# Patient Record
Sex: FEMALE | Race: WHITE | NOT HISPANIC OR LATINO | Employment: FULL TIME | URBAN - METROPOLITAN AREA
[De-identification: names, ages, dates, MRNs, and addresses within clinical notes are randomized per-mention and may not be internally consistent; named-entity substitution may affect disease eponyms.]

---

## 2017-07-03 ENCOUNTER — TELEPHONE (OUTPATIENT)
Dept: FAMILY MEDICINE | Facility: CLINIC | Age: 57
End: 2017-07-03

## 2017-07-03 RX ORDER — NEOMYCIN SULFATE, POLYMYXIN B SULFATE AND HYDROCORTISONE 10; 3.5; 1 MG/ML; MG/ML; [USP'U]/ML
4 SUSPENSION/ DROPS AURICULAR (OTIC) 4 TIMES DAILY
Qty: 10 ML | Refills: 1 | Status: SHIPPED | OUTPATIENT
Start: 2017-07-03 | End: 2017-07-13

## 2017-08-14 ENCOUNTER — OFFICE VISIT (OUTPATIENT)
Dept: FAMILY MEDICINE | Facility: CLINIC | Age: 57
End: 2017-08-14

## 2017-08-14 DIAGNOSIS — M19.079 ANKLE ARTHRITIS: Primary | ICD-10-CM

## 2017-08-14 PROCEDURE — 99202 OFFICE O/P NEW SF 15 MIN: CPT | Mod: S$GLB,,, | Performed by: FAMILY MEDICINE

## 2017-08-14 RX ORDER — METHYLPREDNISOLONE 4 MG/1
TABLET ORAL
Qty: 1 PACKAGE | Refills: 0 | Status: SHIPPED | OUTPATIENT
Start: 2017-08-14 | End: 2021-11-30

## 2017-08-20 NOTE — PROGRESS NOTES
Subjective:      Patient ID: Kentrell Aguilera is a 57 y.o. female.    Chief Complaint: No chief complaint on file.    Ankle pain; no known injuries unless when getting a pedicure and twisted it; several week; swells; other foot is ok; otc no help;       Review of Systems   Musculoskeletal: Positive for arthralgias, gait problem and joint swelling.   All other systems reviewed and are negative.    Objective:     Physical Exam   Musculoskeletal: Normal range of motion. She exhibits edema and tenderness. She exhibits no deformity.   Left ankle pain with rom and palpation and walking; no obvious deformity     Assessment:     1. Ankle arthritis      Plan:     New Prescriptions    METHYLPREDNISOLONE (MEDROL DOSEPACK) 4 MG TABLET    use as directed     Discontinued Medications    No medications on file     Modified Medications    No medications on file       Ankle arthritis  Comments:  left    Orders:  -     X-Ray Ankle Complete Left; Future; Expected date: 08/14/2017  -     Uric acid; Future; Expected date: 08/14/2017    Other orders  -     methylPREDNISolone (MEDROL DOSEPACK) 4 mg tablet; use as directed  Dispense: 1 Package; Refill: 0

## 2017-09-18 ENCOUNTER — TELEPHONE (OUTPATIENT)
Dept: FAMILY MEDICINE | Facility: CLINIC | Age: 57
End: 2017-09-18

## 2017-09-18 NOTE — TELEPHONE ENCOUNTER
----- Message from Eugenia Gamez sent at 9/18/2017  8:50 AM CDT -----  Contact: Pt [phone]  Patient stop by office to  paper work for handicapped sticker. Patient also would like to know if Dr. Mann spoke with Dr. Daniels this morning.

## 2017-10-31 RX ORDER — ACYCLOVIR 400 MG/1
TABLET ORAL
Qty: 30 TABLET | Refills: 3 | Status: SHIPPED | OUTPATIENT
Start: 2017-10-31 | End: 2017-10-31 | Stop reason: SDUPTHER

## 2017-10-31 NOTE — TELEPHONE ENCOUNTER
Please send to [facility]     acyclovir (ZOVIRAX) 400 MG tablet  Take 1 tablet (400 mg total) by mouth 3 (three) times daily.   Normal, Disp-30 tablet, R-3   Please consider 90 day supplies to promote better adherence

## 2017-11-01 RX ORDER — ACYCLOVIR 400 MG/1
400 TABLET ORAL 3 TIMES DAILY
Qty: 30 TABLET | Refills: 3 | Status: SHIPPED | OUTPATIENT
Start: 2017-11-01 | End: 2018-12-02 | Stop reason: SDUPTHER

## 2017-12-16 ENCOUNTER — TELEPHONE (OUTPATIENT)
Dept: FAMILY MEDICINE | Facility: CLINIC | Age: 57
End: 2017-12-16

## 2017-12-16 RX ORDER — ALPRAZOLAM 0.5 MG/1
0.5 TABLET ORAL DAILY PRN
Qty: 40 TABLET | Refills: 0 | Status: SHIPPED | OUTPATIENT
Start: 2017-12-16 | End: 2019-05-29 | Stop reason: SDUPTHER

## 2017-12-26 ENCOUNTER — TELEPHONE (OUTPATIENT)
Dept: FAMILY MEDICINE | Facility: CLINIC | Age: 57
End: 2017-12-26

## 2017-12-26 DIAGNOSIS — M79.671 RIGHT FOOT PAIN: Primary | ICD-10-CM

## 2017-12-26 RX ORDER — HYDROCODONE BITARTRATE AND ACETAMINOPHEN 5; 325 MG/1; MG/1
1 TABLET ORAL 2 TIMES DAILY PRN
Qty: 12 TABLET | Refills: 0 | Status: SHIPPED | OUTPATIENT
Start: 2017-12-26 | End: 2020-08-04

## 2017-12-28 ENCOUNTER — HOSPITAL ENCOUNTER (OUTPATIENT)
Dept: RADIOLOGY | Facility: HOSPITAL | Age: 57
Discharge: HOME | End: 2017-12-28
Attending: FAMILY MEDICINE

## 2017-12-28 DIAGNOSIS — M79.671 RIGHT FOOT PAIN: ICD-10-CM

## 2017-12-28 DIAGNOSIS — Z12.11 COLON CANCER SCREENING: ICD-10-CM

## 2017-12-28 PROCEDURE — 73630 X-RAY EXAM OF FOOT: CPT | Mod: TC,PO,RT

## 2018-06-09 ENCOUNTER — TELEPHONE (OUTPATIENT)
Dept: FAMILY MEDICINE | Facility: CLINIC | Age: 58
End: 2018-06-09

## 2018-06-09 RX ORDER — MECLIZINE HYDROCHLORIDE 25 MG/1
25 TABLET ORAL 3 TIMES DAILY PRN
Qty: 100 TABLET | Refills: 0 | Status: SHIPPED | OUTPATIENT
Start: 2018-06-09 | End: 2020-11-19

## 2018-06-15 DIAGNOSIS — Z11.59 NEED FOR HEPATITIS C SCREENING TEST: ICD-10-CM

## 2018-06-15 DIAGNOSIS — Z12.39 BREAST CANCER SCREENING: ICD-10-CM

## 2018-12-02 ENCOUNTER — TELEPHONE (OUTPATIENT)
Dept: FAMILY MEDICINE | Facility: CLINIC | Age: 58
End: 2018-12-02

## 2018-12-02 RX ORDER — ACYCLOVIR 400 MG/1
400 TABLET ORAL 3 TIMES DAILY
Qty: 30 TABLET | Refills: 3 | Status: SHIPPED | OUTPATIENT
Start: 2018-12-02 | End: 2018-12-02 | Stop reason: SDUPTHER

## 2018-12-02 RX ORDER — ACYCLOVIR 400 MG/1
400 TABLET ORAL 3 TIMES DAILY
Qty: 30 TABLET | Refills: 3 | Status: SHIPPED | OUTPATIENT
Start: 2018-12-02 | End: 2020-12-10

## 2018-12-02 RX ORDER — ACYCLOVIR 400 MG/1
400 TABLET ORAL 3 TIMES DAILY
Qty: 30 TABLET | Refills: 3 | Status: SHIPPED | OUTPATIENT
Start: 2018-12-02 | End: 2018-12-02

## 2018-12-11 ENCOUNTER — TELEPHONE (OUTPATIENT)
Dept: FAMILY MEDICINE | Facility: CLINIC | Age: 58
End: 2018-12-11

## 2018-12-11 DIAGNOSIS — Z00.00 WELLNESS EXAMINATION: Primary | ICD-10-CM

## 2019-01-03 DIAGNOSIS — Z12.11 COLON CANCER SCREENING: ICD-10-CM

## 2019-04-06 ENCOUNTER — TELEPHONE (OUTPATIENT)
Dept: FAMILY MEDICINE | Facility: CLINIC | Age: 59
End: 2019-04-06

## 2019-04-06 DIAGNOSIS — R07.9 CHEST PAIN, UNSPECIFIED TYPE: Primary | ICD-10-CM

## 2019-04-06 NOTE — TELEPHONE ENCOUNTER
Chest pain for 2 hours today around noon today; throbbing, resolved spontaneously  Ex smoker  nonradiatin

## 2019-04-09 ENCOUNTER — TELEPHONE (OUTPATIENT)
Dept: FAMILY MEDICINE | Facility: CLINIC | Age: 59
End: 2019-04-09

## 2019-04-09 ENCOUNTER — HOSPITAL ENCOUNTER (OUTPATIENT)
Dept: CARDIOLOGY | Facility: HOSPITAL | Age: 59
Discharge: HOME | End: 2019-04-09
Payer: COMMERCIAL

## 2019-04-09 ENCOUNTER — HOSPITAL ENCOUNTER (OUTPATIENT)
Dept: RADIOLOGY | Facility: HOSPITAL | Age: 59
Discharge: HOME | End: 2019-04-09
Attending: FAMILY MEDICINE
Payer: COMMERCIAL

## 2019-04-09 DIAGNOSIS — R07.9 CHEST PAIN, UNSPECIFIED TYPE: ICD-10-CM

## 2019-04-09 DIAGNOSIS — Z00.00 WELLNESS EXAMINATION: Primary | ICD-10-CM

## 2019-04-09 DIAGNOSIS — E78.5 HYPERLIPIDEMIA, UNSPECIFIED HYPERLIPIDEMIA TYPE: Primary | ICD-10-CM

## 2019-04-09 PROCEDURE — 71046 X-RAY EXAM CHEST 2 VIEWS: CPT | Mod: TC,FY,PO

## 2019-04-09 PROCEDURE — 93010 ELECTROCARDIOGRAM REPORT: CPT | Mod: ,,, | Performed by: INTERNAL MEDICINE

## 2019-04-09 PROCEDURE — 93010 EKG 12-LEAD: ICD-10-PCS | Mod: ,,, | Performed by: INTERNAL MEDICINE

## 2019-04-09 PROCEDURE — 93005 ELECTROCARDIOGRAM TRACING: CPT | Mod: PO

## 2019-04-09 NOTE — TELEPHONE ENCOUNTER
Pt notified.     ----- Message from Rayray Mann MD sent at 4/9/2019  9:53 AM CDT -----  CALL PT TESTS ARE NORMAL

## 2019-04-10 ENCOUNTER — TELEPHONE (OUTPATIENT)
Dept: FAMILY MEDICINE | Facility: CLINIC | Age: 59
End: 2019-04-10

## 2019-04-10 NOTE — TELEPHONE ENCOUNTER
Gave pt lab results. She stated that she does not eat red meats or dairy at all, and she'd like to know what else she can do to lower her cholesterol levels. She did state that she has a rigorous exercise routine. Any additional suggestions?

## 2019-04-10 NOTE — TELEPHONE ENCOUNTER
----- Message from Rayray Mann MD sent at 4/9/2019  9:22 PM CDT -----  Cholesterol high; follow low cholesterol diet : avoid red meat and dairy products and repeat lipid 3 months

## 2019-04-15 ENCOUNTER — HOSPITAL ENCOUNTER (OUTPATIENT)
Dept: CARDIOLOGY | Facility: HOSPITAL | Age: 59
Discharge: HOME | End: 2019-04-15
Attending: FAMILY MEDICINE
Payer: COMMERCIAL

## 2019-04-15 DIAGNOSIS — R07.9 CHEST PAIN, UNSPECIFIED TYPE: ICD-10-CM

## 2019-04-15 LAB
CV STRESS BASE HR: 77 BPM
DIASTOLIC BLOOD PRESSURE: 99 MMHG
OHS CV CPX 1 MINUTE RECOVERY HEART RATE: 107 BPM
OHS CV CPX 85 PERCENT MAX PREDICTED HEART RATE MALE: 130
OHS CV CPX MAX PREDICTED HEART RATE: 153
OHS CV CPX PATIENT IS FEMALE: 1
OHS CV CPX PATIENT IS MALE: 0
OHS CV CPX PEAK DIASTOLIC BLOOD PRESSURE: 93 MMHG
OHS CV CPX PEAK HEAR RATE: 129 BPM
OHS CV CPX PEAK RATE PRESSURE PRODUCT: NORMAL
OHS CV CPX PEAK SYSTOLIC BLOOD PRESSURE: 155 MMHG
OHS CV CPX PERCENT MAX PREDICTED HEART RATE ACHIEVED: 84
OHS CV CPX PERCENT TARGET HEART RATE ACHIEVED: 94.85
OHS CV CPX RATE PRESSURE PRODUCT PRESENTING: NORMAL
OHS CV CPX TARGET HEART RATE: 136
STRESS ANGINA INDEX: 0
STRESS ECHO POST EXERCISE DUR MIN: 6 MIN
STRESS ECHO POST EXERCISE DUR SEC: 5
SYSTOLIC BLOOD PRESSURE: 151 MMHG

## 2019-04-15 PROCEDURE — 93018 CV STRESS TEST I&R ONLY: CPT | Mod: ,,, | Performed by: INTERNAL MEDICINE

## 2019-04-15 PROCEDURE — 93017 CV STRESS TEST TRACING ONLY: CPT | Mod: PO

## 2019-04-15 PROCEDURE — 93018 TREADMILL STRESS TEST (CUPID ONLY): ICD-10-PCS | Mod: ,,, | Performed by: INTERNAL MEDICINE

## 2019-04-15 PROCEDURE — 93016 CV STRESS TEST SUPVJ ONLY: CPT | Mod: ,,, | Performed by: INTERNAL MEDICINE

## 2019-04-15 PROCEDURE — 93016 TREADMILL STRESS TEST (CUPID ONLY): ICD-10-PCS | Mod: ,,, | Performed by: INTERNAL MEDICINE

## 2019-05-29 ENCOUNTER — TELEPHONE (OUTPATIENT)
Dept: FAMILY MEDICINE | Facility: CLINIC | Age: 59
End: 2019-05-29

## 2019-05-29 RX ORDER — CLOBETASOL PROPIONATE 0.5 MG/G
CREAM TOPICAL 2 TIMES DAILY
Qty: 60 G | Refills: 5 | Status: SHIPPED | OUTPATIENT
Start: 2019-05-29 | End: 2021-11-30

## 2019-05-29 RX ORDER — NEOMYCIN SULFATE, POLYMYXIN B SULFATE AND HYDROCORTISONE 10; 3.5; 1 MG/ML; MG/ML; [USP'U]/ML
3 SUSPENSION/ DROPS AURICULAR (OTIC) 3 TIMES DAILY
Qty: 10 ML | Refills: 2 | Status: SHIPPED | OUTPATIENT
Start: 2019-05-29 | End: 2020-08-03 | Stop reason: SDUPTHER

## 2019-05-29 RX ORDER — ALPRAZOLAM 0.5 MG/1
0.5 TABLET ORAL DAILY PRN
Qty: 40 TABLET | Refills: 0 | Status: SHIPPED | OUTPATIENT
Start: 2019-05-29 | End: 2020-08-03 | Stop reason: SDUPTHER

## 2019-05-31 RX ORDER — HYDROCORTISONE 25 MG/G
CREAM TOPICAL
Qty: 28 G | Refills: 11 | Status: SHIPPED | OUTPATIENT
Start: 2019-05-31 | End: 2020-05-02

## 2019-05-31 RX ORDER — HYDROCORTISONE 25 MG/G
CREAM TOPICAL 2 TIMES DAILY
Qty: 28 G | OUTPATIENT
Start: 2019-05-31

## 2019-05-31 NOTE — TELEPHONE ENCOUNTER
Reminder....    Pt states that you were going to call her in a cheaper cream.  The current one you prescribed is over 100.00

## 2019-07-15 PROBLEM — Z00.00 WELLNESS EXAMINATION: Status: RESOLVED | Noted: 2019-04-09 | Resolved: 2019-07-15

## 2019-11-13 ENCOUNTER — OFFICE VISIT (OUTPATIENT)
Dept: FAMILY MEDICINE | Facility: CLINIC | Age: 59
End: 2019-11-13
Payer: COMMERCIAL

## 2019-11-13 ENCOUNTER — HOSPITAL ENCOUNTER (OUTPATIENT)
Dept: RADIOLOGY | Facility: HOSPITAL | Age: 59
Discharge: HOME | End: 2019-11-13
Attending: FAMILY MEDICINE
Payer: COMMERCIAL

## 2019-11-13 ENCOUNTER — TELEPHONE (OUTPATIENT)
Dept: FAMILY MEDICINE | Facility: CLINIC | Age: 59
End: 2019-11-13

## 2019-11-13 VITALS
HEART RATE: 87 BPM | DIASTOLIC BLOOD PRESSURE: 78 MMHG | SYSTOLIC BLOOD PRESSURE: 118 MMHG | OXYGEN SATURATION: 97 % | TEMPERATURE: 98 F

## 2019-11-13 DIAGNOSIS — R06.02 SOB (SHORTNESS OF BREATH): Primary | ICD-10-CM

## 2019-11-13 DIAGNOSIS — R06.02 SOB (SHORTNESS OF BREATH): ICD-10-CM

## 2019-11-13 DIAGNOSIS — R07.9 ACUTE CHEST PAIN: ICD-10-CM

## 2019-11-13 DIAGNOSIS — Z12.39 SCREENING FOR BREAST CANCER: ICD-10-CM

## 2019-11-13 DIAGNOSIS — R06.00 DYSPNEA: Primary | ICD-10-CM

## 2019-11-13 PROCEDURE — 99213 PR OFFICE/OUTPT VISIT, EST, LEVL III, 20-29 MIN: ICD-10-PCS | Mod: S$GLB,,, | Performed by: FAMILY MEDICINE

## 2019-11-13 PROCEDURE — 25500020 PHARM REV CODE 255: Mod: PO | Performed by: FAMILY MEDICINE

## 2019-11-13 PROCEDURE — 99213 OFFICE O/P EST LOW 20 MIN: CPT | Mod: S$GLB,,, | Performed by: FAMILY MEDICINE

## 2019-11-13 PROCEDURE — 71275 CT ANGIOGRAPHY CHEST: CPT | Mod: TC,PO

## 2019-11-13 RX ADMIN — IOHEXOL 100 ML: 350 INJECTION, SOLUTION INTRAVENOUS at 03:11

## 2019-11-13 NOTE — TELEPHONE ENCOUNTER
----- Message from Bridgette Metzger sent at 11/13/2019  2:36 PM CST -----  Contact: Marielos stoddard/ [facility] [phone]  Marielos is calling regarding the medical records request you sent. She states you will need to order paper charts.

## 2019-11-13 NOTE — PROGRESS NOTES
Subjective:      Patient ID: Kentrell Aguilera is a 59 y.o. female.    Chief Complaint: Shortness of Breath (Happened yesterday while at a conference.); Dizziness; and Chest Pain      Vitals:    11/13/19 1254   BP: 118/78   Pulse: 87   Temp: 98.4 °F (36.9 °C)   SpO2: 97%        HPI   SOB Saturday afternoon after working in the garden in AM, worse after [employer] game and Sunday and has Wednesday, worsened;  No fever, no cough, chest kind of hurts; yesterday during at a conference, got dizzy, couldn't catch a deep breath; had same thin in April, had a cardiac w/u; no smoking for 15 years; no travels; took xarelto from Dr Terry 7 years ago for SOB and a fall and hurt ankle, with a bruise and took xarelto for 4 months, unknown reason    Review of Systems   Constitutional: Negative.    HENT: Negative.    Respiratory: Positive for shortness of breath.    Cardiovascular: Positive for chest pain.   Gastrointestinal: Negative.    Endocrine: Negative.    Genitourinary: Negative.    Musculoskeletal: Negative.    Neurological: Positive for weakness.   Psychiatric/Behavioral: Negative.    All other systems reviewed and are negative.    Objective:     Physical Exam   Constitutional: She is oriented to person, place, and time. She appears well-developed and well-nourished.   HENT:   Head: Normocephalic.   Eyes: Pupils are equal, round, and reactive to light. Conjunctivae and EOM are normal.   Neck: Normal range of motion. Neck supple.   Cardiovascular: Normal rate, regular rhythm and normal heart sounds.   Pulmonary/Chest: Effort normal and breath sounds normal.   Keeps sighing during visit   Musculoskeletal: Normal range of motion.   Neurological: She is alert and oriented to person, place, and time. She has normal reflexes.   Skin: Skin is warm and dry.   Psychiatric: She has a normal mood and affect. Her behavior is normal. Judgment and thought content normal.   Nursing note and vitals reviewed.    Assessment:     1. SOB (shortness  of breath)    2. Acute chest pain    3. Screening for breast cancer      Plan:        Medication List           Accurate as of November 13, 2019 11:59 PM. If you have any questions, ask your nurse or doctor.               CHANGE how you take these medications    clobetasol 0.05 % cream  Commonly known as:  TEMOVATE  Apply topically 2 (two) times daily. for 10 days  What changed:    · when to take this  · reasons to take this        CONTINUE taking these medications    acyclovir 400 MG tablet  Commonly known as:  ZOVIRAX  Take 1 tablet (400 mg total) by mouth 3 (three) times daily.     ALPRAZolam 0.5 MG tablet  Commonly known as:  XANAX  Take 1 tablet (0.5 mg total) by mouth daily as needed for Anxiety.     HYDROcodone-acetaminophen 5-325 mg per tablet  Commonly known as:  NORCO  Take 1 tablet by mouth 2 (two) times daily as needed for Pain.     hydrocortisone 2.5 % cream  APPLY  CREAM TO RASH TWICE DAILY     meclizine 25 mg tablet  Commonly known as:  ANTIVERT  Take 1 tablet (25 mg total) by mouth 3 (three) times daily as needed.     methylPREDNISolone 4 mg tablet  Commonly known as:  MEDROL DOSEPACK  use as directed     neomycin-polymyxin-hydrocortisone 3.5-10,000-1 mg/mL-unit/mL-% otic suspension  Commonly known as:  CORTISPORIN  Place 3 drops into the left ear 3 (three) times daily.     terbinafine HCl 250 mg tablet  Commonly known as:  LamISIL  Take 1 tablet (250 mg total) by mouth once daily.          SOB (shortness of breath)  -     CTA Chest Non Coronary; Future; Expected date: 11/13/2019    Acute chest pain  -     Cancel: CTA Chest Non Coronary; Future; Expected date: 11/13/2019  -     CTA Chest Non Coronary; Future; Expected date: 11/13/2019    Screening for breast cancer  -     Mammo Digital Screening Bilat w/ Shaquille; Future; Expected date: 11/13/2019

## 2019-11-15 ENCOUNTER — TELEPHONE (OUTPATIENT)
Dept: ADMINISTRATIVE | Facility: OTHER | Age: 59
End: 2019-11-15

## 2019-11-19 ENCOUNTER — TELEPHONE (OUTPATIENT)
Dept: FAMILY MEDICINE | Facility: CLINIC | Age: 59
End: 2019-11-19

## 2019-11-21 ENCOUNTER — PATIENT OUTREACH (OUTPATIENT)
Dept: ADMINISTRATIVE | Facility: HOSPITAL | Age: 59
End: 2019-11-21

## 2019-11-25 ENCOUNTER — HOSPITAL ENCOUNTER (OUTPATIENT)
Dept: RADIOLOGY | Facility: HOSPITAL | Age: 59
Discharge: HOME | End: 2019-11-25
Attending: FAMILY MEDICINE
Payer: COMMERCIAL

## 2019-11-25 DIAGNOSIS — Z12.39 SCREENING FOR BREAST CANCER: ICD-10-CM

## 2019-11-25 PROCEDURE — 77067 SCR MAMMO BI INCL CAD: CPT | Mod: TC,PO

## 2019-12-09 RX ORDER — ACYCLOVIR 400 MG/1
TABLET ORAL
Qty: 30 TABLET | Refills: 3 | Status: SHIPPED | OUTPATIENT
Start: 2019-12-09 | End: 2020-08-24 | Stop reason: SDUPTHER

## 2019-12-12 ENCOUNTER — TELEPHONE (OUTPATIENT)
Dept: FAMILY MEDICINE | Facility: CLINIC | Age: 59
End: 2019-12-12

## 2019-12-12 DIAGNOSIS — E78.5 HYPERLIPIDEMIA, UNSPECIFIED HYPERLIPIDEMIA TYPE: Primary | ICD-10-CM

## 2019-12-12 DIAGNOSIS — E55.9 VITAMIN D DEFICIENCY: ICD-10-CM

## 2019-12-12 NOTE — TELEPHONE ENCOUNTER
Pt wants to know if she needs any additional labs before the end of the year.  She does not recall having her WELLNESS labs done for the year.      Please let her know.

## 2019-12-16 ENCOUNTER — LAB VISIT (OUTPATIENT)
Dept: LAB | Facility: HOSPITAL | Age: 59
End: 2019-12-16
Attending: FAMILY MEDICINE
Payer: COMMERCIAL

## 2019-12-16 DIAGNOSIS — E55.9 VITAMIN D DEFICIENCY: ICD-10-CM

## 2019-12-16 DIAGNOSIS — E78.5 HYPERLIPIDEMIA, UNSPECIFIED HYPERLIPIDEMIA TYPE: ICD-10-CM

## 2019-12-16 LAB
25(OH)D3+25(OH)D2 SERPL-MCNC: 30 NG/ML (ref 30–96)
CHOLEST SERPL-MCNC: 239 MG/DL (ref 120–199)
CHOLEST/HDLC SERPL: 3.6 {RATIO} (ref 2–5)
HDLC SERPL-MCNC: 66 MG/DL (ref 40–75)
HDLC SERPL: 27.6 % (ref 20–50)
LDLC SERPL CALC-MCNC: 153 MG/DL (ref 63–159)
NONHDLC SERPL-MCNC: 173 MG/DL
TRIGL SERPL-MCNC: 100 MG/DL (ref 30–150)

## 2019-12-16 PROCEDURE — 36415 COLL VENOUS BLD VENIPUNCTURE: CPT | Mod: PO

## 2019-12-16 PROCEDURE — 80061 LIPID PANEL: CPT

## 2019-12-16 PROCEDURE — 82306 VITAMIN D 25 HYDROXY: CPT | Mod: PO

## 2019-12-17 ENCOUNTER — TELEPHONE (OUTPATIENT)
Dept: FAMILY MEDICINE | Facility: CLINIC | Age: 59
End: 2019-12-17

## 2019-12-17 DIAGNOSIS — E78.5 HYPERLIPIDEMIA, UNSPECIFIED HYPERLIPIDEMIA TYPE: Primary | ICD-10-CM

## 2019-12-17 RX ORDER — ATORVASTATIN CALCIUM 10 MG/1
10 TABLET, FILM COATED ORAL DAILY
Qty: 90 TABLET | Refills: 3 | Status: SHIPPED | OUTPATIENT
Start: 2019-12-17 | End: 2020-11-19

## 2020-03-03 ENCOUNTER — OFFICE VISIT (OUTPATIENT)
Dept: FAMILY MEDICINE | Facility: CLINIC | Age: 60
End: 2020-03-03
Payer: COMMERCIAL

## 2020-03-03 ENCOUNTER — PATIENT MESSAGE (OUTPATIENT)
Dept: FAMILY MEDICINE | Facility: CLINIC | Age: 60
End: 2020-03-03

## 2020-03-03 DIAGNOSIS — C44.90 SKIN CANCER: Primary | ICD-10-CM

## 2020-03-03 PROCEDURE — 99212 OFFICE O/P EST SF 10 MIN: CPT | Mod: S$GLB,,, | Performed by: FAMILY MEDICINE

## 2020-03-03 PROCEDURE — 88305 TISSUE EXAM BY PATHOLOGIST: ICD-10-PCS | Mod: 26,,, | Performed by: PATHOLOGY

## 2020-03-03 PROCEDURE — 88305 TISSUE EXAM BY PATHOLOGIST: CPT | Mod: 26,,, | Performed by: PATHOLOGY

## 2020-03-03 PROCEDURE — 99212 PR OFFICE/OUTPT VISIT, EST, LEVL II, 10-19 MIN: ICD-10-PCS | Mod: S$GLB,,, | Performed by: FAMILY MEDICINE

## 2020-03-03 PROCEDURE — 88305 TISSUE EXAM BY PATHOLOGIST: CPT | Performed by: PATHOLOGY

## 2020-03-03 NOTE — PROGRESS NOTES
Subjective:      Patient ID: Kentrell Aguilera is a 59 y.o. female.    Chief Complaint: No chief complaint on file.      There were no vitals filed for this visit.     HPI   Left lower neck/upper chest skin lesion needs excision    Review of Systems   Skin: Positive for wound.        Raised border skin lesion     Objective:     Physical Exam  Assessment:     No diagnosis found.  Plan:        Medication List           Accurate as of March 3, 2020  8:50 AM. If you have any questions, ask your nurse or doctor.               CHANGE how you take these medications    clobetasoL 0.05 % cream  Commonly known as:  TEMOVATE  Apply topically 2 (two) times daily. for 10 days  What changed:    · when to take this  · reasons to take this        CONTINUE taking these medications    * acyclovir 400 MG tablet  Commonly known as:  ZOVIRAX  Take 1 tablet (400 mg total) by mouth 3 (three) times daily.     * acyclovir 400 MG tablet  Commonly known as:  ZOVIRAX  TAKE 1 TABLET BY MOUTH THREE TIMES DAILY     ALPRAZolam 0.5 MG tablet  Commonly known as:  XANAX  Take 1 tablet (0.5 mg total) by mouth daily as needed for Anxiety.     atorvastatin 10 MG tablet  Commonly known as:  LIPITOR  Take 1 tablet (10 mg total) by mouth once daily.     HYDROcodone-acetaminophen 5-325 mg per tablet  Commonly known as:  NORCO  Take 1 tablet by mouth 2 (two) times daily as needed for Pain.     hydrocortisone 2.5 % cream  APPLY  CREAM TO RASH TWICE DAILY     meclizine 25 mg tablet  Commonly known as:  ANTIVERT  Take 1 tablet (25 mg total) by mouth 3 (three) times daily as needed.     methylPREDNISolone 4 mg tablet  Commonly known as:  MEDROL DOSEPACK  use as directed     neomycin-polymyxin-hydrocortisone 3.5-10,000-1 mg/mL-unit/mL-% otic suspension  Commonly known as:  CORTISPORIN  Place 3 drops into the left ear 3 (three) times daily.     terbinafine HCl 250 mg tablet  Commonly known as:  LamISIL  Take 1 tablet (250 mg total) by mouth once daily.         * This  list has 2 medication(s) that are the same as other medications prescribed for you. Read the directions carefully, and ask your doctor or other care provider to review them with you.              There are no diagnoses linked to this encounter.

## 2020-03-03 NOTE — PROCEDURES
"Excision of Benign Lesion  Date/Time: 3/3/2020 8:52 AM  Performed by: Rayray Mann MD  Authorized by: Rayray Mann MD     Consent Done?:  Yes (Verbal)  Time out: Immediately prior to procedure a "time out" was called to verify the correct patient, procedure, equipment, support staff and site/side marked as required.      STAFF:  Assistants?: No    INDICATIONS:: sq cell cancer.  LOCATION:  Body area:  Chestleft    PREP:Position:  Supine    ANESTHESIA:  Anesthesia:  Local infiltration  Local anesthetic:  Lidocaine 1% with epinephrine    PROCEDURE DETAILS:  Excision type:  Skin  Excision size (cm):  2  Scalpel size:  15  Incision type:  Elliptical  Specimens?: Yes    Specimens submitted to pathology.  Hemostasis was obtained.  Estimated blood loss (cc):  1  Wound closure:  Simple  Sutures:  Other (comments) (5-0 ethilon)  Sterile dressings:  Gauze  Post-op diagnosis: Same as pre-op diagnosis.  Patient tolerated the procedure well with no immediate complications.  Post-operative instructions were provided for the patient.  Patient was discharged and will follow up for wound check and pathology results.      "

## 2020-03-09 LAB
FINAL PATHOLOGIC DIAGNOSIS: NORMAL
GROSS: NORMAL
MICROSCOPIC EXAM: NORMAL

## 2020-03-10 ENCOUNTER — TELEPHONE (OUTPATIENT)
Dept: FAMILY MEDICINE | Facility: CLINIC | Age: 60
End: 2020-03-10

## 2020-03-10 NOTE — TELEPHONE ENCOUNTER
----- Message from Rayray Mann MD sent at 3/10/2020  7:29 AM CDT -----  It was a skin cancer.  Basal cell cancer.  Completely excised.  Return to clinic to remove sutures.

## 2020-04-30 DIAGNOSIS — M25.572 ACUTE LEFT ANKLE PAIN: Primary | ICD-10-CM

## 2020-05-01 ENCOUNTER — TELEPHONE (OUTPATIENT)
Dept: FAMILY MEDICINE | Facility: CLINIC | Age: 60
End: 2020-05-01

## 2020-05-01 DIAGNOSIS — M25.572 ACUTE LEFT ANKLE PAIN: Primary | ICD-10-CM

## 2020-05-01 RX ORDER — HYDROCORTISONE VALERATE CREAM 2 MG/G
CREAM TOPICAL 2 TIMES DAILY
Qty: 45 G | Refills: 1 | Status: SHIPPED | OUTPATIENT
Start: 2020-05-01 | End: 2020-05-02

## 2020-05-01 NOTE — TELEPHONE ENCOUNTER
Patient needs x-ray of her ankle and to see Dr. Daniels orthopedics help her get these things done please thank you

## 2020-05-01 NOTE — TELEPHONE ENCOUNTER
Pt has been scheduled for xray. Referral to Dr. Daniels's office has been faxed. They will contact pt to schedule an appt.

## 2020-05-02 ENCOUNTER — TELEPHONE (OUTPATIENT)
Dept: FAMILY MEDICINE | Facility: CLINIC | Age: 60
End: 2020-05-02

## 2020-05-04 ENCOUNTER — HOSPITAL ENCOUNTER (OUTPATIENT)
Dept: RADIOLOGY | Facility: HOSPITAL | Age: 60
Discharge: HOME | End: 2020-05-04
Attending: FAMILY MEDICINE

## 2020-05-04 DIAGNOSIS — M25.572 ACUTE LEFT ANKLE PAIN: ICD-10-CM

## 2020-05-04 PROCEDURE — 73610 X-RAY EXAM OF ANKLE: CPT | Mod: TC,FY,PO,LT

## 2020-05-25 ENCOUNTER — TELEPHONE (OUTPATIENT)
Dept: FAMILY MEDICINE | Facility: CLINIC | Age: 60
End: 2020-05-25

## 2020-05-25 NOTE — TELEPHONE ENCOUNTER
I placed a referral May 1 to Dr Daniels.  Pt came to my house and said no one has called her, yesterday.    Please call the pt to help her get her appt.

## 2020-05-26 ENCOUNTER — TELEPHONE (OUTPATIENT)
Dept: FAMILY MEDICINE | Facility: CLINIC | Age: 60
End: 2020-05-26

## 2020-05-26 NOTE — TELEPHONE ENCOUNTER
Susu - I spoke to the pt a few weeks back.  Seems as though Dr Daniels is not on her plan.  At least that is what she told me.  I told her that we could change the referral to an internal provider, but she said that she would wait to see how things progressed.      ----- Message from Susu Perez sent at 5/25/2020  8:19 AM CDT -----  Dr. Mann,    I apologize the Referral was place as external, they do not fall into my work Q. Unless a message is sent to me I will not be aware of the referral. I was not aware of this that it didn't fall into my work Q if it was put as an external.      I will fax it over to Dr. Ricks office on this morning.      Thank you,  (:     Susu     Referral Coordinator

## 2020-05-27 ENCOUNTER — TELEPHONE (OUTPATIENT)
Dept: FAMILY MEDICINE | Facility: CLINIC | Age: 60
End: 2020-05-27

## 2020-05-27 NOTE — TELEPHONE ENCOUNTER
Spoke with pt in regards to message. Informed pt that Dr. Daniels's office is not in network with her insurance. Pt will review her plan specific documents tonight and contact us later to inform us which providers are covered.

## 2020-05-27 NOTE — TELEPHONE ENCOUNTER
----- Message from Anais Leggett sent at 5/27/2020  2:11 PM CDT -----  Contact: Dr Daniels Bzgdmm-[phone]  Dr Daniels Evjith-[phone] is requesting a call back regarding the Dr Does not Accept the pt's Insurance. Please call

## 2020-06-02 NOTE — TELEPHONE ENCOUNTER
I spoke with pt on 5/27/2020 to discuss who's on her plan. See message from 5/27/2020. Pt stated that she would check her plan specific data and call us back once she obtained info.

## 2020-08-03 ENCOUNTER — HOSPITAL ENCOUNTER (EMERGENCY)
Facility: HOSPITAL | Age: 60
Discharge: HOME | End: 2020-08-04
Attending: EMERGENCY MEDICINE

## 2020-08-03 ENCOUNTER — TELEPHONE (OUTPATIENT)
Dept: FAMILY MEDICINE | Facility: CLINIC | Age: 60
End: 2020-08-03

## 2020-08-03 DIAGNOSIS — F10.929 ALCOHOLIC INTOXICATION WITH COMPLICATION: ICD-10-CM

## 2020-08-03 DIAGNOSIS — S01.81XA LACERATION OF FOREHEAD, INITIAL ENCOUNTER: Primary | ICD-10-CM

## 2020-08-03 DIAGNOSIS — S09.90XA INJURY OF HEAD, INITIAL ENCOUNTER: ICD-10-CM

## 2020-08-03 DIAGNOSIS — R22.0 NASAL SWELLING: ICD-10-CM

## 2020-08-03 PROCEDURE — 90715 TDAP VACCINE 7 YRS/> IM: CPT | Mod: ER | Performed by: EMERGENCY MEDICINE

## 2020-08-03 PROCEDURE — 25000003 PHARM REV CODE 250: Mod: ER | Performed by: EMERGENCY MEDICINE

## 2020-08-03 PROCEDURE — 90471 IMMUNIZATION ADMIN: CPT | Mod: ER | Performed by: EMERGENCY MEDICINE

## 2020-08-03 PROCEDURE — 63600175 PHARM REV CODE 636 W HCPCS: Mod: ER | Performed by: EMERGENCY MEDICINE

## 2020-08-03 PROCEDURE — 12013 RPR F/E/E/N/L/M 2.6-5.0 CM: CPT | Mod: ER

## 2020-08-03 PROCEDURE — 99284 EMERGENCY DEPT VISIT MOD MDM: CPT | Mod: 25,ER

## 2020-08-03 RX ORDER — NEOMYCIN SULFATE, POLYMYXIN B SULFATE AND HYDROCORTISONE 10; 3.5; 1 MG/ML; MG/ML; [USP'U]/ML
3 SUSPENSION/ DROPS AURICULAR (OTIC) 3 TIMES DAILY
Qty: 10 ML | Refills: 2 | Status: SHIPPED | OUTPATIENT
Start: 2020-08-03 | End: 2020-11-19

## 2020-08-03 RX ORDER — ALPRAZOLAM 0.5 MG/1
0.5 TABLET ORAL DAILY PRN
Qty: 40 TABLET | Refills: 0 | Status: SHIPPED | OUTPATIENT
Start: 2020-08-03 | End: 2020-11-19 | Stop reason: SDUPTHER

## 2020-08-03 RX ADMIN — CLOSTRIDIUM TETANI TOXOID ANTIGEN (FORMALDEHYDE INACTIVATED), CORYNEBACTERIUM DIPHTHERIAE TOXOID ANTIGEN (FORMALDEHYDE INACTIVATED), BORDETELLA PERTUSSIS TOXOID ANTIGEN (GLUTARALDEHYDE INACTIVATED), BORDETELLA PERTUSSIS FILAMENTOUS HEMAGGLUTININ ANTIGEN (FORMALDEHYDE INACTIVATED), BORDETELLA PERTUSSIS PERTACTIN ANTIGEN, AND BORDETELLA PERTUSSIS FIMBRIAE 2/3 ANTIGEN 0.5 ML: 5; 2; 2.5; 5; 3; 5 INJECTION, SUSPENSION INTRAMUSCULAR at 11:08

## 2020-08-03 RX ADMIN — LIDOCAINE HYDROCHLORIDE 10 ML: 10; .005 INJECTION, SOLUTION EPIDURAL; INFILTRATION; INTRACAUDAL; PERINEURAL at 11:08

## 2020-08-03 NOTE — Clinical Note
Kentrell Aguilera was seen and treated in our emergency department on 8/3/2020.  She may return to work on 08/05/2020.       If you have any questions or concerns, please don't hesitate to call.      Jake Gonzalez MD

## 2020-08-04 ENCOUNTER — OFFICE VISIT (OUTPATIENT)
Dept: FAMILY MEDICINE | Facility: CLINIC | Age: 60
End: 2020-08-04

## 2020-08-04 ENCOUNTER — HOSPITAL ENCOUNTER (OUTPATIENT)
Dept: RADIOLOGY | Facility: HOSPITAL | Age: 60
Discharge: HOME | End: 2020-08-04
Attending: FAMILY MEDICINE

## 2020-08-04 VITALS
SYSTOLIC BLOOD PRESSURE: 138 MMHG | DIASTOLIC BLOOD PRESSURE: 84 MMHG | HEIGHT: 68 IN | OXYGEN SATURATION: 96 % | WEIGHT: 235 LBS | HEART RATE: 93 BPM | BODY MASS INDEX: 35.61 KG/M2 | TEMPERATURE: 100 F

## 2020-08-04 VITALS
HEIGHT: 67 IN | BODY MASS INDEX: 36.88 KG/M2 | OXYGEN SATURATION: 100 % | TEMPERATURE: 98 F | WEIGHT: 235 LBS | SYSTOLIC BLOOD PRESSURE: 125 MMHG | RESPIRATION RATE: 20 BRPM | DIASTOLIC BLOOD PRESSURE: 68 MMHG | HEART RATE: 79 BPM

## 2020-08-04 DIAGNOSIS — S01.81XS FACE LACERATIONS, SEQUELA: Primary | ICD-10-CM

## 2020-08-04 DIAGNOSIS — S00.83XD CONTUSION OF FACE, SUBSEQUENT ENCOUNTER: ICD-10-CM
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
## 2020-08-04 DIAGNOSIS — H60.502 ACUTE OTITIS EXTERNA OF LEFT EAR, UNSPECIFIED TYPE: ICD-10-CM

## 2020-08-04 DIAGNOSIS — M25.532 LEFT WRIST PAIN: ICD-10-CM

## 2020-08-04 PROCEDURE — 73110 X-RAY EXAM OF WRIST: CPT | Mod: TC,FY,LT,ER

## 2020-08-04 PROCEDURE — 99212 OFFICE O/P EST SF 10 MIN: CPT | Mod: S$GLB,,, | Performed by: FAMILY MEDICINE

## 2020-08-04 PROCEDURE — 99212 PR OFFICE/OUTPT VISIT, EST, LEVL II, 10-19 MIN: ICD-10-PCS | Mod: S$GLB,,, | Performed by: FAMILY MEDICINE
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
## 2020-08-10 ENCOUNTER — PATIENT OUTREACH (OUTPATIENT)
Dept: ADMINISTRATIVE | Facility: HOSPITAL | Age: 60
End: 2020-08-10

## 2020-08-10 ENCOUNTER — OFFICE VISIT (OUTPATIENT)
Dept: FAMILY MEDICINE | Facility: CLINIC | Age: 60
End: 2020-08-10

## 2020-08-10 VITALS
DIASTOLIC BLOOD PRESSURE: 84 MMHG | SYSTOLIC BLOOD PRESSURE: 122 MMHG | HEART RATE: 77 BPM | WEIGHT: 233.69 LBS | BODY MASS INDEX: 41.41 KG/M2 | HEIGHT: 63 IN | TEMPERATURE: 98 F | OXYGEN SATURATION: 96 %

## 2020-08-10 DIAGNOSIS — S01.111S: ICD-10-CM

## 2020-08-10 DIAGNOSIS — T07.XXXA ABRASIONS OF MULTIPLE SITES: Primary | ICD-10-CM

## 2020-08-10 DIAGNOSIS — R07.81 PAINFUL RIB: ICD-10-CM

## 2020-08-10 DIAGNOSIS — S00.03XS CONTUSION OF SCALP, SEQUELA: ICD-10-CM

## 2020-08-10 PROCEDURE — 99211 PR OFFICE/OUTPT VISIT, EST, LEVL I: ICD-10-PCS | Mod: S$GLB,,, | Performed by: FAMILY MEDICINE

## 2020-08-10 PROCEDURE — 99211 OFF/OP EST MAY X REQ PHY/QHP: CPT | Mod: S$GLB,,, | Performed by: FAMILY MEDICINE

## 2020-08-10 RX ORDER — HYDROCORTISONE VALERATE CREAM 2 MG/G
CREAM TOPICAL
COMMUNITY
Start: 2020-05-09 | End: 2021-11-30

## 2020-08-10 NOTE — PROGRESS NOTES
"Subjective:      Patient ID: Kentrell Aguilera is a 60 y.o. female.    Chief Complaint: Suture / Staple Removal      Vitals:    08/10/20 1017   BP: 122/84   Pulse: 77   Temp: 97.8 °F (36.6 °C)   TempSrc: Oral   SpO2: 96%   Weight: 106 kg (233 lb 11 oz)   Height: 5' 3" (1.6 m)        HPI   followup laceration, abrasions and contusions; has tip of left ulna styloid, non displaced  Wearing brace    Problem List  Patient Active Problem List   Diagnosis    Right foot pain        ALLERGIES:   Review of patient's allergies indicates:   Allergen Reactions    Erythromycin     Pcn [penicillins]        MEDS:   Current Outpatient Medications:     acyclovir (ZOVIRAX) 400 MG tablet, Take 1 tablet (400 mg total) by mouth 3 (three) times daily., Disp: 30 tablet, Rfl: 3    acyclovir (ZOVIRAX) 400 MG tablet, TAKE 1 TABLET BY MOUTH THREE TIMES DAILY, Disp: 30 tablet, Rfl: 3    ALPRAZolam (XANAX) 0.5 MG tablet, Take 1 tablet (0.5 mg total) by mouth daily as needed for Anxiety., Disp: 40 tablet, Rfl: 0    atorvastatin (LIPITOR) 10 MG tablet, Take 1 tablet (10 mg total) by mouth once daily., Disp: 90 tablet, Rfl: 3    hydrocortisone (WESTCORT) 0.2 % cream, APPLY CREAM TOPICALLY TWICE DAILY FOR 10 DAYS, Disp: , Rfl:     meclizine (ANTIVERT) 25 mg tablet, Take 1 tablet (25 mg total) by mouth 3 (three) times daily as needed., Disp: 100 tablet, Rfl: 0    neomycin-polymyxin-hydrocortisone (CORTISPORIN) 3.5-10,000-1 mg/mL-unit/mL-% otic suspension, Place 3 drops into the left ear 3 (three) times daily., Disp: 10 mL, Rfl: 2    terbinafine HCl (LAMISIL) 250 mg tablet, Take 1 tablet (250 mg total) by mouth once daily., Disp: 30 tablet, Rfl: 0      History:  Current Providers as of 8/10/2020  PCP: Rayray Mann MD  Care Team Provider: Donnell Mann MD  Care Team Provider: Felton Meraz LPN  Care Team Provider: Rayray Mann MD  Care Team Provider: Herman Santos LPN  Encounter Provider: Rayray Mann MD, starting on Mon Aug 10, "  12:00 AM  Referring Provider: not found, starting on Mon Aug 10, 2020 12:00 AM  Consulting Physician: Rayray Mann MD, starting on Mon Aug 10, 2020 10:14 AM (Active)   History reviewed. No pertinent past medical history.  Past Surgical History:   Procedure Laterality Date    BREAST BIOPSY Left     negative  negative     Social History     Tobacco Use    Smoking status: Former Smoker     Packs/day: 0.50     Types: Cigarettes     Quit date:      Years since quittin.6    Smokeless tobacco: Never Used   Substance Use Topics    Alcohol use: Yes     Frequency: 2-4 times a month    Drug use: Not on file         Review of Systems   Constitutional: Negative.    HENT: Negative.    Eyes:        Swelling around right eye   Respiratory: Negative.         Rib pain   Cardiovascular: Negative.    Gastrointestinal: Negative.    Endocrine: Negative.    Genitourinary: Negative.    Musculoskeletal: Negative.    Skin: Positive for wound.        Laceration right eyebrow    Abrasions knees and righ elbow   Psychiatric/Behavioral: Negative.    All other systems reviewed and are negative.    Objective:     Physical Exam  Vitals signs and nursing note reviewed.   Constitutional:       Appearance: She is well-developed.   HENT:      Head: Normocephalic.   Eyes:      Conjunctiva/sclera: Conjunctivae normal.      Pupils: Pupils are equal, round, and reactive to light.   Neck:      Musculoskeletal: Normal range of motion and neck supple.   Cardiovascular:      Rate and Rhythm: Normal rate and regular rhythm.      Heart sounds: Normal heart sounds.   Pulmonary:      Effort: Pulmonary effort is normal.      Breath sounds: Normal breath sounds.   Musculoskeletal: Normal range of motion.         General: Swelling and signs of injury present.   Skin:     General: Skin is warm and dry.      Findings: Bruising and rash present.      Comments: Lacerated right eyebrow healing, surtures removed   Neurological:      Mental Status:  She is alert and oriented to person, place, and time.      Deep Tendon Reflexes: Reflexes are normal and symmetric.   Psychiatric:         Behavior: Behavior normal.         Thought Content: Thought content normal.         Judgment: Judgment normal.             Assessment:     1. Abrasions of multiple sites    2. Contusion of scalp, sequela    3. Painful rib    4. Laceration of skin of right eyelid and periocular area, sequela      Plan:        Medication List          Accurate as of August 10, 2020 11:26 AM. If you have any questions, ask your nurse or doctor.            CONTINUE taking these medications    * acyclovir 400 MG tablet  Commonly known as: ZOVIRAX  Take 1 tablet (400 mg total) by mouth 3 (three) times daily.     * acyclovir 400 MG tablet  Commonly known as: ZOVIRAX  TAKE 1 TABLET BY MOUTH THREE TIMES DAILY     ALPRAZolam 0.5 MG tablet  Commonly known as: XANAX  Take 1 tablet (0.5 mg total) by mouth daily as needed for Anxiety.     atorvastatin 10 MG tablet  Commonly known as: LIPITOR  Take 1 tablet (10 mg total) by mouth once daily.     hydrocortisone 0.2 % cream  Commonly known as: WESTCORT     meclizine 25 mg tablet  Commonly known as: ANTIVERT  Take 1 tablet (25 mg total) by mouth 3 (three) times daily as needed.     neomycin-polymyxin-hydrocortisone 3.5-10,000-1 mg/mL-unit/mL-% otic suspension  Commonly known as: CORTISPORIN  Place 3 drops into the left ear 3 (three) times daily.     terbinafine  mg tablet  Commonly known as: LamISIL  Take 1 tablet (250 mg total) by mouth once daily.         * This list has 2 medication(s) that are the same as other medications prescribed for you. Read the directions carefully, and ask your doctor or other care provider to review them with you.              Abrasions of multiple sites    Contusion of scalp, sequela    Painful rib    Laceration of skin of right eyelid and periocular area, sequela      One more week for swelling for right eye to go away

## 2020-08-24 ENCOUNTER — OFFICE VISIT (OUTPATIENT)
Dept: FAMILY MEDICINE | Facility: CLINIC | Age: 60
End: 2020-08-24

## 2020-08-24 ENCOUNTER — HOSPITAL ENCOUNTER (OUTPATIENT)
Dept: RADIOLOGY | Facility: HOSPITAL | Age: 60
Discharge: HOME | End: 2020-08-24
Attending: FAMILY MEDICINE

## 2020-08-24 ENCOUNTER — TELEPHONE (OUTPATIENT)
Dept: FAMILY MEDICINE | Facility: CLINIC | Age: 60
End: 2020-08-24

## 2020-08-24 VITALS
BODY MASS INDEX: 41.41 KG/M2 | HEIGHT: 63 IN | SYSTOLIC BLOOD PRESSURE: 112 MMHG | HEART RATE: 96 BPM | DIASTOLIC BLOOD PRESSURE: 80 MMHG | TEMPERATURE: 98 F | OXYGEN SATURATION: 97 % | WEIGHT: 233.69 LBS

## 2020-08-24 DIAGNOSIS — S80.212A ABRASION OF LEFT KNEE, INITIAL ENCOUNTER: ICD-10-CM
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
## 2020-08-24 DIAGNOSIS — R07.81 PAINFUL RIB: Primary | ICD-10-CM

## 2020-08-24 DIAGNOSIS — R07.81 PAINFUL RIB: ICD-10-CM

## 2020-08-24 DIAGNOSIS — S22.32XA CLOSED FRACTURE OF ONE RIB OF LEFT SIDE, INITIAL ENCOUNTER: Primary | ICD-10-CM

## 2020-08-24 PROCEDURE — 99212 OFFICE O/P EST SF 10 MIN: CPT | Mod: S$GLB,,, | Performed by: FAMILY MEDICINE

## 2020-08-24 PROCEDURE — 99212 PR OFFICE/OUTPT VISIT, EST, LEVL II, 10-19 MIN: ICD-10-PCS | Mod: S$GLB,,, | Performed by: FAMILY MEDICINE

## 2020-08-24 PROCEDURE — 71100 X-RAY EXAM RIBS UNI 2 VIEWS: CPT | Mod: TC,FY,PO,LT

## 2020-08-24 RX ORDER — ACYCLOVIR 400 MG/1
400 TABLET ORAL 3 TIMES DAILY
Qty: 30 TABLET | Refills: 11 | Status: SHIPPED | OUTPATIENT
Start: 2020-08-24 | End: 2020-11-19 | Stop reason: SDUPTHER

## 2020-08-24 RX ORDER — MUPIROCIN 20 MG/G
OINTMENT TOPICAL DAILY
Qty: 30 G | Refills: 1 | Status: SHIPPED | OUTPATIENT
Start: 2020-08-24 | End: 2020-09-03

## 2020-08-24 NOTE — TELEPHONE ENCOUNTER
Pt has an apt to see you this afternoon.  She fell this morning, and is c/o left sided rib pain.  She is requesting orders for have an xray before her visit.

## 2020-08-24 NOTE — PROGRESS NOTES
"  Fell again at work this am; tripped; fell on left elbow and injured ribs; had xray; fx of left 9th; left knee abrasion  Subjective:      Patient ID: Kentrell Aguilera is a 60 y.o. female.    Chief Complaint: Fall (this morning)      Vitals:    08/24/20 1511   BP: 112/80   Pulse: 96   Temp: 98.4 °F (36.9 °C)   TempSrc: Oral   SpO2: 97%   Weight: 106 kg (233 lb 11 oz)   Height: 5' 3" (1.6 m)        HPI   Fell ; see above    Problem List  Patient Active Problem List   Diagnosis    Right foot pain        ALLERGIES:   Review of patient's allergies indicates:   Allergen Reactions    Erythromycin     Pcn [penicillins]        MEDS:   Current Outpatient Medications:     acyclovir (ZOVIRAX) 400 MG tablet, Take 1 tablet (400 mg total) by mouth 3 (three) times daily., Disp: 30 tablet, Rfl: 3    acyclovir (ZOVIRAX) 400 MG tablet, Take 1 tablet (400 mg total) by mouth 3 (three) times daily., Disp: 30 tablet, Rfl: 11    ALPRAZolam (XANAX) 0.5 MG tablet, Take 1 tablet (0.5 mg total) by mouth daily as needed for Anxiety., Disp: 40 tablet, Rfl: 0    atorvastatin (LIPITOR) 10 MG tablet, Take 1 tablet (10 mg total) by mouth once daily., Disp: 90 tablet, Rfl: 3    hydrocortisone (WESTCORT) 0.2 % cream, APPLY CREAM TOPICALLY TWICE DAILY FOR 10 DAYS, Disp: , Rfl:     meclizine (ANTIVERT) 25 mg tablet, Take 1 tablet (25 mg total) by mouth 3 (three) times daily as needed., Disp: 100 tablet, Rfl: 0    neomycin-polymyxin-hydrocortisone (CORTISPORIN) 3.5-10,000-1 mg/mL-unit/mL-% otic suspension, Place 3 drops into the left ear 3 (three) times daily., Disp: 10 mL, Rfl: 2    terbinafine HCl (LAMISIL) 250 mg tablet, Take 1 tablet (250 mg total) by mouth once daily., Disp: 30 tablet, Rfl: 0    mupirocin (BACTROBAN) 2 % ointment, Apply topically once daily. for 10 days, Disp: 30 g, Rfl: 1    traMADoL (ULTRAM) 50 mg tablet, Take 1 tablet (50 mg total) by mouth 3 (three) times daily as needed for Pain., Disp: 20 each, Rfl: " 0      History:  Current Providers as of 2020  PCP: Rayray Mann MD  Care Team Provider: Donnell Mann MD  Care Team Provider: Felton Meraz LPN  Care Team Provider: Rayray Mann MD  Care Team Provider: Herman Santos LPN  Encounter Provider: Rayray Mann MD, starting on Mon Aug 24, 2020 12:00 AM  Referring Provider: not found, starting on Mon Aug 24, 2020 12:00 AM  Consulting Physician: Rayray Mann MD, starting on Mon Aug 24, 2020  3:10 PM (Active)   History reviewed. No pertinent past medical history.  Past Surgical History:   Procedure Laterality Date    BREAST BIOPSY Left     negative  negative     Social History     Tobacco Use    Smoking status: Former Smoker     Packs/day: 0.50     Types: Cigarettes     Quit date:      Years since quittin.6    Smokeless tobacco: Never Used   Substance Use Topics    Alcohol use: Yes     Frequency: 2-4 times a month    Drug use: Not on file         Review of Systems   Constitutional: Negative.    HENT: Negative.    Respiratory: Negative.    Cardiovascular: Positive for chest pain.   Gastrointestinal: Negative.    Endocrine: Negative.    Genitourinary: Negative.    Musculoskeletal: Positive for gait problem.   Skin: Positive for wound.   Psychiatric/Behavioral: Negative.    All other systems reviewed and are negative.    Objective:     Physical Exam  Vitals signs and nursing note reviewed.   Constitutional:       Appearance: She is well-developed.   HENT:      Head: Normocephalic.   Eyes:      Conjunctiva/sclera: Conjunctivae normal.      Pupils: Pupils are equal, round, and reactive to light.   Neck:      Musculoskeletal: Normal range of motion and neck supple.   Cardiovascular:      Rate and Rhythm: Normal rate and regular rhythm.      Heart sounds: Normal heart sounds.   Pulmonary:      Effort: Pulmonary effort is normal.      Breath sounds: Normal breath sounds.      Comments: Tender rib; good BS, abrasion of left  knee  Musculoskeletal: Normal range of motion.   Skin:     General: Skin is warm and dry.   Neurological:      Mental Status: She is alert and oriented to person, place, and time.      Deep Tendon Reflexes: Reflexes are normal and symmetric.   Psychiatric:         Behavior: Behavior normal.         Thought Content: Thought content normal.         Judgment: Judgment normal.             Assessment:     1. Closed fracture of one rib of left side, initial encounter    2. Fall, initial encounter    3. Abrasion of left knee, initial encounter      Plan:        Medication List          Accurate as of August 24, 2020 11:59 PM. If you have any questions, ask your nurse or doctor.            START taking these medications    mupirocin 2 % ointment  Commonly known as: BACTROBAN  Apply topically once daily. for 10 days  Started by: Rayray Mann MD        CONTINUE taking these medications    * acyclovir 400 MG tablet  Commonly known as: ZOVIRAX  Take 1 tablet (400 mg total) by mouth 3 (three) times daily.     * acyclovir 400 MG tablet  Commonly known as: ZOVIRAX  Take 1 tablet (400 mg total) by mouth 3 (three) times daily.     ALPRAZolam 0.5 MG tablet  Commonly known as: XANAX  Take 1 tablet (0.5 mg total) by mouth daily as needed for Anxiety.     atorvastatin 10 MG tablet  Commonly known as: LIPITOR  Take 1 tablet (10 mg total) by mouth once daily.     hydrocortisone 0.2 % cream  Commonly known as: WESTCORT     meclizine 25 mg tablet  Commonly known as: ANTIVERT  Take 1 tablet (25 mg total) by mouth 3 (three) times daily as needed.     neomycin-polymyxin-hydrocortisone 3.5-10,000-1 mg/mL-unit/mL-% otic suspension  Commonly known as: CORTISPORIN  Place 3 drops into the left ear 3 (three) times daily.     terbinafine  mg tablet  Commonly known as: LamISIL  Take 1 tablet (250 mg total) by mouth once daily.         * This list has 2 medication(s) that are the same as other medications prescribed for you. Read the  directions carefully, and ask your doctor or other care provider to review them with you.               Where to Get Your Medications      These medications were sent to [id number] - [place], [place] - [address] [facility]  [address] [facility] [address]    Phone: [phone]   · acyclovir 400 MG tablet  · mupirocin 2 % ointment       Closed fracture of one rib of left side, initial encounter    Fall, initial encounter    Abrasion of left knee, initial encounter    Other orders  -     acyclovir (ZOVIRAX) 400 MG tablet; Take 1 tablet (400 mg total) by mouth 3 (three) times daily.  Dispense: 30 tablet; Refill: 11  -     mupirocin (BACTROBAN) 2 % ointment; Apply topically once daily. for 10 days  Dispense: 30 g; Refill: 1

## 2020-08-25 ENCOUNTER — TELEPHONE (OUTPATIENT)
Dept: FAMILY MEDICINE | Facility: CLINIC | Age: 60
End: 2020-08-25

## 2020-08-25 RX ORDER — TRAMADOL HYDROCHLORIDE 50 MG/1
50 TABLET ORAL 3 TIMES DAILY PRN
Qty: 20 EACH | Refills: 0 | Status: SHIPPED | OUTPATIENT
Start: 2020-08-25 | End: 2020-11-19

## 2020-08-25 NOTE — TELEPHONE ENCOUNTER
----- Message from Rosemary Rojas sent at 8/25/2020 10:43 AM CDT -----  Regarding: Pharm  Contact: Pharm/[phone]  Type:  Pharmacy Calling to Clarify an RX    Name of Caller:   Pharmacy Name: [facility] [address] [place] [address] [address] [facility];  Prescription Name: traMADoL (ULTRAM) 50 mg tablet  What do they need to clarify?: interacts with her Xanax. Please call to verify  Best Call Back Number: Pharm/[phone]  Additional Information:

## 2020-08-25 NOTE — TELEPHONE ENCOUNTER
[facility] [place] pharmacy would like you to be made aware that there's an interaction with pt's Xanax and Tramadol. Pharmacy has not dispensed Tramadol.

## 2020-10-05 ENCOUNTER — PATIENT MESSAGE (OUTPATIENT)
Dept: INTERNAL MEDICINE | Facility: CLINIC | Age: 60
End: 2020-10-05

## 2020-11-19 ENCOUNTER — OFFICE VISIT (OUTPATIENT)
Dept: FAMILY MEDICINE | Facility: CLINIC | Age: 60
End: 2020-11-19
Payer: MEDICAID

## 2020-11-19 ENCOUNTER — TELEPHONE (OUTPATIENT)
Dept: FAMILY MEDICINE | Facility: CLINIC | Age: 60
End: 2020-11-19

## 2020-11-19 VITALS
WEIGHT: 233.69 LBS | SYSTOLIC BLOOD PRESSURE: 126 MMHG | BODY MASS INDEX: 36.68 KG/M2 | DIASTOLIC BLOOD PRESSURE: 86 MMHG | HEIGHT: 67 IN | HEART RATE: 76 BPM | OXYGEN SATURATION: 98 % | TEMPERATURE: 98 F

## 2020-11-19 DIAGNOSIS — Z00.00 WELLNESS EXAMINATION: ICD-10-CM

## 2020-11-19 DIAGNOSIS — B02.9 HERPES ZOSTER WITHOUT COMPLICATION: Primary | ICD-10-CM

## 2020-11-19 DIAGNOSIS — Z78.0 POST-MENOPAUSAL: Primary | ICD-10-CM

## 2020-11-19 DIAGNOSIS — F41.9 ANXIETY: ICD-10-CM

## 2020-11-19 DIAGNOSIS — G47.33 OSA (OBSTRUCTIVE SLEEP APNEA): ICD-10-CM

## 2020-11-19 PROCEDURE — 99213 OFFICE O/P EST LOW 20 MIN: CPT | Mod: S$GLB,,, | Performed by: FAMILY MEDICINE

## 2020-11-19 PROCEDURE — 99213 PR OFFICE/OUTPT VISIT, EST, LEVL III, 20-29 MIN: ICD-10-PCS | Mod: S$GLB,,, | Performed by: FAMILY MEDICINE

## 2020-11-19 RX ORDER — ALPRAZOLAM 0.5 MG/1
0.5 TABLET ORAL DAILY PRN
Qty: 40 TABLET | Refills: 0 | Status: SHIPPED | OUTPATIENT
Start: 2020-11-19 | End: 2021-08-17 | Stop reason: SDUPTHER

## 2020-11-19 RX ORDER — ACYCLOVIR 400 MG/1
800 TABLET ORAL 3 TIMES DAILY
Qty: 50 TABLET | Refills: 11 | Status: SHIPPED | OUTPATIENT
Start: 2020-11-19 | End: 2021-11-30

## 2020-11-19 RX ORDER — GABAPENTIN 300 MG/1
300 CAPSULE ORAL 3 TIMES DAILY
Qty: 90 CAPSULE | Refills: 11 | Status: SHIPPED | OUTPATIENT
Start: 2020-11-19 | End: 2020-12-10

## 2020-11-19 NOTE — TELEPHONE ENCOUNTER
Please put orders in for annual labs and mammogram.      Old records were requested from [facility].

## 2020-11-19 NOTE — PROGRESS NOTES
"  Subjective:      Patient ID: Kentrell Aguilera is a 60 y.o. female.    Chief Complaint: Rash (under left breast and back)      Vitals:    11/19/20 0848   BP: 126/86   Pulse: 76   Temp: 97.8 °F (36.6 °C)   TempSrc: Oral   SpO2: 98%   Weight: 106 kg (233 lb 11 oz)   Height: 5' 7" (1.702 m)        HPI rash right chest wall, discomfort present;     Problem List  Patient Active Problem List   Diagnosis    Right foot pain        ALLERGIES:   Review of patient's allergies indicates:   Allergen Reactions    Erythromycin     Pcn [penicillins]        MEDS:   Current Outpatient Medications:     acyclovir (ZOVIRAX) 400 MG tablet, Take 1 tablet (400 mg total) by mouth 3 (three) times daily., Disp: 30 tablet, Rfl: 3    acyclovir (ZOVIRAX) 400 MG tablet, Take 2 tablets (800 mg total) by mouth 3 (three) times daily. For one week for shingles, then as directed for fever blister, Disp: 50 tablet, Rfl: 11    ALPRAZolam (XANAX) 0.5 MG tablet, Take 1 tablet (0.5 mg total) by mouth daily as needed for Anxiety., Disp: 40 tablet, Rfl: 0    hydrocortisone (WESTCORT) 0.2 % cream, APPLY CREAM TOPICALLY TWICE DAILY FOR 10 DAYS, Disp: , Rfl:     gabapentin (NEURONTIN) 300 MG capsule, Take 1 capsule (300 mg total) by mouth 3 (three) times daily., Disp: 90 capsule, Rfl: 11      History:  Current Providers as of 11/19/2020  PCP: Rayray Mann MD  Care Team Provider: Donnell Mann MD  Care Team Provider: Felton Meraz LPN  Care Team Provider: Herman Santos LPN  Encounter Provider: Rayray Mann MD, starting on Thu Nov 19, 2020 12:00 AM  Referring Provider: not found, starting on Thu Nov 19, 2020 12:00 AM  Consulting Physician: Rayray Mann MD, starting on Thu Nov 19, 2020  8:47 AM (Active)   History reviewed. No pertinent past medical history.  Past Surgical History:   Procedure Laterality Date    BREAST BIOPSY Left     negative 2015 negative     Social History     Tobacco Use    Smoking status: Former Smoker     Packs/day: " 0.50     Types: Cigarettes     Quit date:      Years since quittin.9    Smokeless tobacco: Never Used   Substance Use Topics    Alcohol use: Yes     Frequency: 2-4 times a month    Drug use: Not on file         Review of Systems   Constitutional: Negative.    HENT: Negative.    Respiratory: Negative.    Cardiovascular: Negative.    Gastrointestinal: Negative.    Endocrine: Negative.    Genitourinary: Negative.    Musculoskeletal: Negative.    Skin: Positive for rash.   Psychiatric/Behavioral: The patient is nervous/anxious.    All other systems reviewed and are negative.    Objective:     Physical Exam  Vitals signs and nursing note reviewed.   Constitutional:       Appearance: She is well-developed.   HENT:      Head: Normocephalic.   Eyes:      Conjunctiva/sclera: Conjunctivae normal.      Pupils: Pupils are equal, round, and reactive to light.   Neck:      Musculoskeletal: Normal range of motion and neck supple.   Cardiovascular:      Rate and Rhythm: Normal rate and regular rhythm.      Heart sounds: Normal heart sounds.   Pulmonary:      Effort: Pulmonary effort is normal.      Breath sounds: Normal breath sounds.   Musculoskeletal: Normal range of motion.   Skin:     General: Skin is warm and dry.      Comments: Rash c/w shingles right chest wall   Neurological:      Mental Status: She is alert and oriented to person, place, and time.      Deep Tendon Reflexes: Reflexes are normal and symmetric.   Psychiatric:         Behavior: Behavior normal.         Thought Content: Thought content normal.         Judgment: Judgment normal.             Assessment:     1. Herpes zoster without complication    2. RUFUS (obstructive sleep apnea)    3. Anxiety      Plan:        Medication List          Accurate as of 2020 11:59 PM. If you have any questions, ask your nurse or doctor.            START taking these medications    gabapentin 300 MG capsule  Commonly known as: NEURONTIN  Take 1 capsule (300  mg total) by mouth 3 (three) times daily.  Started by: Rayray Mann MD        CHANGE how you take these medications    * acyclovir 400 MG tablet  Commonly known as: ZOVIRAX  Take 1 tablet (400 mg total) by mouth 3 (three) times daily.  What changed: Another medication with the same name was changed. Make sure you understand how and when to take each.  Changed by: Rayray Mann MD     * acyclovir 400 MG tablet  Commonly known as: ZOVIRAX  Take 2 tablets (800 mg total) by mouth 3 (three) times daily. For one week for shingles, then as directed for fever blister  What changed:   · how much to take  · additional instructions  Changed by: Rayray Mann MD         * This list has 2 medication(s) that are the same as other medications prescribed for you. Read the directions carefully, and ask your doctor or other care provider to review them with you.            CONTINUE taking these medications    ALPRAZolam 0.5 MG tablet  Commonly known as: XANAX  Take 1 tablet (0.5 mg total) by mouth daily as needed for Anxiety.     hydrocortisone 0.2 % cream  Commonly known as: WESTCORT        STOP taking these medications    atorvastatin 10 MG tablet  Commonly known as: LIPITOR  Stopped by: Rayray Mann MD     meclizine 25 mg tablet  Commonly known as: ANTIVERT  Stopped by: Rayray Mann MD     neomycin-polymyxin-hydrocortisone 3.5-10,000-1 mg/mL-unit/mL-% otic suspension  Commonly known as: CORTISPORIN  Stopped by: Rayray Mann MD     terbinafine  mg tablet  Commonly known as: LamISIL  Stopped by: Rayray Mann MD     traMADoL 50 mg tablet  Commonly known as: ULTRAM  Stopped by: Rayary Mann MD           Where to Get Your Medications      Information about where to get these medications is not yet available    Ask your nurse or doctor about these medications  · acyclovir 400 MG tablet  · ALPRAZolam 0.5 MG tablet  · gabapentin 300 MG capsule       Herpes zoster without complication  -     Mammo Digital  Screening Margaux stoddard/ [facility]; Future; Expected date: 11/19/2020  -     Ambulatory referral/consult to Sleep Disorders; Future; Expected date: 11/26/2020    RUFUS (obstructive sleep apnea)    Anxiety    Other orders  -     ALPRAZolam (XANAX) 0.5 MG tablet; Take 1 tablet (0.5 mg total) by mouth daily as needed for Anxiety.  Dispense: 40 tablet; Refill: 0  -     gabapentin (NEURONTIN) 300 MG capsule; Take 1 capsule (300 mg total) by mouth 3 (three) times daily.  Dispense: 90 capsule; Refill: 11  -     acyclovir (ZOVIRAX) 400 MG tablet; Take 2 tablets (800 mg total) by mouth 3 (three) times daily. For one week for shingles, then as directed for fever blister  Dispense: 50 tablet; Refill: 11

## 2020-11-20 ENCOUNTER — TELEPHONE (OUTPATIENT)
Dept: FAMILY MEDICINE | Facility: CLINIC | Age: 60
End: 2020-11-20

## 2020-11-21 ENCOUNTER — TELEPHONE (OUTPATIENT)
Dept: FAMILY MEDICINE | Facility: CLINIC | Age: 60
End: 2020-11-21

## 2020-11-23 ENCOUNTER — TELEPHONE (OUTPATIENT)
Dept: FAMILY MEDICINE | Facility: CLINIC | Age: 60
End: 2020-11-23

## 2020-11-25 ENCOUNTER — TELEPHONE (OUTPATIENT)
Dept: FAMILY MEDICINE | Facility: CLINIC | Age: 60
End: 2020-11-25

## 2020-11-25 DIAGNOSIS — M23.52 CHRONIC INSTABILITY OF LEFT KNEE: ICD-10-CM

## 2020-11-25 NOTE — TELEPHONE ENCOUNTER
Pt called with left knee giving out for 2 weeks; no trauma; hx of right knee surgery twice; ex athlete, swimmer at [employer]

## 2020-11-30 ENCOUNTER — TELEPHONE (OUTPATIENT)
Dept: FAMILY MEDICINE | Facility: CLINIC | Age: 60
End: 2020-11-30

## 2020-11-30 ENCOUNTER — HOSPITAL ENCOUNTER (OUTPATIENT)
Dept: RADIOLOGY | Facility: HOSPITAL | Age: 60
Discharge: HOME | End: 2020-11-30
Attending: FAMILY MEDICINE
Payer: MEDICAID

## 2020-11-30 DIAGNOSIS — M23.52 CHRONIC INSTABILITY OF LEFT KNEE: ICD-10-CM

## 2020-11-30 PROCEDURE — 73560 X-RAY EXAM OF KNEE 1 OR 2: CPT | Mod: TC,FY,PO,LT

## 2020-11-30 NOTE — TELEPHONE ENCOUNTER
----- Message from Rayray Mann MD sent at 11/30/2020  8:14 AM CST -----  Arthritis getting worse than on last xray

## 2020-12-02 ENCOUNTER — TELEPHONE (OUTPATIENT)
Dept: FAMILY MEDICINE | Facility: CLINIC | Age: 60
End: 2020-12-02

## 2020-12-02 NOTE — TELEPHONE ENCOUNTER
Spoke with patient and informed insurance denied coverage of MRI and will cancel scheduled MRI. Patient voices understanding.

## 2020-12-02 NOTE — TELEPHONE ENCOUNTER
Spoke with patient and xray results given. Informed ortho consult sent to Dr. Daniels's office. Patient voices understanding.

## 2020-12-07 ENCOUNTER — TELEPHONE (OUTPATIENT)
Dept: FAMILY MEDICINE | Facility: CLINIC | Age: 60
End: 2020-12-07

## 2020-12-09 ENCOUNTER — OFFICE VISIT (OUTPATIENT)
Dept: FAMILY MEDICINE | Facility: CLINIC | Age: 60
End: 2020-12-09
Payer: MEDICAID

## 2020-12-09 DIAGNOSIS — L60.0 INGROWN TOENAIL OF RIGHT FOOT: Primary | ICD-10-CM

## 2020-12-09 DIAGNOSIS — E66.9 OBESITY, UNSPECIFIED CLASSIFICATION, UNSPECIFIED OBESITY TYPE, UNSPECIFIED WHETHER SERIOUS COMORBIDITY PRESENT: ICD-10-CM

## 2020-12-09 PROCEDURE — 99213 PR OFFICE/OUTPT VISIT, EST, LEVL III, 20-29 MIN: ICD-10-PCS | Mod: S$GLB,,, | Performed by: FAMILY MEDICINE

## 2020-12-09 PROCEDURE — 99213 OFFICE O/P EST LOW 20 MIN: CPT | Mod: S$GLB,,, | Performed by: FAMILY MEDICINE

## 2020-12-09 RX ORDER — TERBINAFINE HYDROCHLORIDE 250 MG/1
250 TABLET ORAL DAILY
Qty: 90 TABLET | Refills: 0 | Status: SHIPPED | OUTPATIENT
Start: 2020-12-09 | End: 2021-05-17

## 2020-12-09 NOTE — PROCEDURES
Nail Removal    Date/Time: 12/9/2020 9:32 AM  Performed by: Rayray Mann MD  Authorized by: Rayray Mann MD     Consent Done?:  Yes (Verbal)    Location:  Right foot  Anesthesia:  Digital block  Local anesthetic: lidocaine 1% without epinephrine  Anesthetic total (ml):  4  Preparation:  Skin prepped with Betadine    Amount removed:  Complete  Wedge excision of skin of nail fold: No    Nail bed sutured?: No    Nail matrix removed:  None  Removed nail replaced and anchored: No    Dressing applied:  Petrolatum-impregnated gauze and tube gauze  Patient tolerance:  Patient tolerated the procedure well with no immediate complications

## 2020-12-09 NOTE — PROGRESS NOTES
Subjective:      Patient ID: Kentrell Aguilera is a 60 y.o. female.    Chief Complaint: No chief complaint on file.      There were no vitals filed for this visit.     HPI   Right big toenail hurts; stubbed in August with a fall; pain progressing since then; no pus, no blood, nail looks like trying to fall off; not diabetic    Problem List  Patient Active Problem List   Diagnosis    Right foot pain    Ingrown toenail of right foot        ALLERGIES:   Review of patient's allergies indicates:   Allergen Reactions    Erythromycin     Pcn [penicillins]        MEDS:   Current Outpatient Medications:     ALPRAZolam (XANAX) 0.5 MG tablet, Take 1 tablet (0.5 mg total) by mouth daily as needed for Anxiety., Disp: 40 tablet, Rfl: 0    terbinafine HCL (LAMISIL) 250 mg tablet, Take 1 tablet (250 mg total) by mouth once daily. (Patient not taking: Reported on 12/10/2020), Disp: 90 tablet, Rfl: 0      History:  Current Providers as of 2020  PCP: Rayary Mann MD  Care Team Provider: Donnell Mann MD  Care Team Provider: Felton Meraz LPN  Care Team Provider: Herman Santos LPN  Encounter Provider: Rayray Mann MD, starting on Wed Dec 9, 2020 12:00 AM  Referring Provider: not found, starting on Wed Dec 9, 2020 12:00 AM  Consulting Physician: Rayray Mann MD, starting on Wed Dec 9, 2020  8:56 AM (Active)   History reviewed. No pertinent past medical history.  Past Surgical History:   Procedure Laterality Date    BREAST BIOPSY Left     negative  negative     Social History     Tobacco Use    Smoking status: Former Smoker     Packs/day: 0.50     Types: Cigarettes     Quit date:      Years since quittin.9    Smokeless tobacco: Never Used   Substance Use Topics    Alcohol use: Yes     Frequency: 2-4 times a month    Drug use: Not on file         Review of Systems   Constitutional: Negative.    HENT: Negative.    Respiratory: Negative.    Cardiovascular: Negative.    Gastrointestinal: Negative.     Endocrine: Negative.    Genitourinary: Negative.    Musculoskeletal: Negative.    Skin: Positive for wound.        Right big toenail   Psychiatric/Behavioral: Positive for sleep disturbance.   All other systems reviewed and are negative.    Objective:     Physical Exam  Vitals signs and nursing note reviewed.   Constitutional:       Appearance: She is well-developed. She is obese.   HENT:      Head: Normocephalic.   Eyes:      Conjunctiva/sclera: Conjunctivae normal.      Pupils: Pupils are equal, round, and reactive to light.   Neck:      Musculoskeletal: Normal range of motion and neck supple.   Cardiovascular:      Rate and Rhythm: Normal rate and regular rhythm.      Heart sounds: Normal heart sounds.   Pulmonary:      Effort: Pulmonary effort is normal.      Breath sounds: Normal breath sounds.   Musculoskeletal: Normal range of motion.   Skin:     General: Skin is warm and dry.      Comments: Partially avulsed nail right big toe removed, remain regrowing toenail is ingrown down into nail bed   Neurological:      Mental Status: She is alert and oriented to person, place, and time.      Deep Tendon Reflexes: Reflexes are normal and symmetric.   Psychiatric:         Behavior: Behavior normal.         Thought Content: Thought content normal.         Judgment: Judgment normal.             Assessment:     1. Ingrown toenail of right foot    2. Obesity, unspecified classification, unspecified obesity type, unspecified whether serious comorbidity present      Plan:        Medication List          Accurate as of December 9, 2020 11:59 PM. If you have any questions, ask your nurse or doctor.            START taking these medications    terbinafine  mg tablet  Commonly known as: LamISIL  Take 1 tablet (250 mg total) by mouth once daily.  Started by: Rayray Mann MD        CONTINUE taking these medications    * acyclovir 400 MG tablet  Commonly known as: ZOVIRAX  Take 1 tablet (400 mg total) by mouth 3 (three)  times daily.     * acyclovir 400 MG tablet  Commonly known as: ZOVIRAX  Take 2 tablets (800 mg total) by mouth 3 (three) times daily. For one week for shingles, then as directed for fever blister     ALPRAZolam 0.5 MG tablet  Commonly known as: XANAX  Take 1 tablet (0.5 mg total) by mouth daily as needed for Anxiety.     gabapentin 300 MG capsule  Commonly known as: NEURONTIN  Take 1 capsule (300 mg total) by mouth 3 (three) times daily.     hydrocortisone 0.2 % cream  Commonly known as: WESTCORT         * This list has 2 medication(s) that are the same as other medications prescribed for you. Read the directions carefully, and ask your doctor or other care provider to review them with you.               Where to Get Your Medications      These medications were sent to [facility] [address]  [facility]  [address]  [facility] [address]    Phone: [phone]   · terbinafine  mg tablet       Ingrown toenail of right foot  -     Nail Removal    Obesity, unspecified classification, unspecified obesity type, unspecified whether serious comorbidity present    Other orders  -     terbinafine HCL (LAMISIL) 250 mg tablet; Take 1 tablet (250 mg total) by mouth once daily. (Patient not taking: Reported on 12/10/2020)  Dispense: 90 tablet; Refill: 0

## 2020-12-29 ENCOUNTER — HOSPITAL ENCOUNTER (OUTPATIENT)
Dept: RADIOLOGY | Facility: HOSPITAL | Age: 60
Discharge: HOME | End: 2020-12-29
Attending: FAMILY MEDICINE
Payer: MEDICAID

## 2020-12-29 DIAGNOSIS — Z00.00 WELLNESS EXAMINATION: ICD-10-CM

## 2020-12-29 DIAGNOSIS — Z78.0 POST-MENOPAUSAL: ICD-10-CM

## 2020-12-29 PROCEDURE — 77067 SCR MAMMO BI INCL CAD: CPT | Mod: TC,PO

## 2021-01-20 ENCOUNTER — TELEPHONE (OUTPATIENT)
Dept: FAMILY MEDICINE | Facility: CLINIC | Age: 61
End: 2021-01-20

## 2021-01-20 DIAGNOSIS — Z01.419 WELL WOMAN EXAM: Primary | ICD-10-CM

## 2021-01-21 ENCOUNTER — TELEPHONE (OUTPATIENT)
Dept: FAMILY MEDICINE | Facility: CLINIC | Age: 61
End: 2021-01-21

## 2021-02-23 ENCOUNTER — OFFICE VISIT (OUTPATIENT)
Dept: OBSTETRICS AND GYNECOLOGY | Facility: CLINIC | Age: 61
End: 2021-02-23
Payer: MEDICAID

## 2021-02-23 VITALS — SYSTOLIC BLOOD PRESSURE: 120 MMHG | BODY MASS INDEX: 34.46 KG/M2 | DIASTOLIC BLOOD PRESSURE: 78 MMHG | WEIGHT: 220 LBS

## 2021-02-23 DIAGNOSIS — Z01.419 WELL WOMAN EXAM: ICD-10-CM

## 2021-02-23 DIAGNOSIS — Z12.4 PAP SMEAR FOR CERVICAL CANCER SCREENING: ICD-10-CM

## 2021-02-23 DIAGNOSIS — Z01.419 ENCOUNTER FOR ANNUAL ROUTINE GYNECOLOGICAL EXAMINATION: Primary | ICD-10-CM

## 2021-02-23 PROCEDURE — 87624 HPV HI-RISK TYP POOLED RSLT: CPT

## 2021-02-23 PROCEDURE — 99999 PR PBB SHADOW E&M-EST. PATIENT-LVL III: ICD-10-PCS | Mod: PBBFAC,,, | Performed by: OBSTETRICS & GYNECOLOGY

## 2021-02-23 PROCEDURE — 99386 PR PREVENTIVE VISIT,NEW,40-64: ICD-10-PCS | Mod: S$PBB,,, | Performed by: OBSTETRICS & GYNECOLOGY

## 2021-02-23 PROCEDURE — 99213 OFFICE O/P EST LOW 20 MIN: CPT | Mod: PBBFAC,PN | Performed by: OBSTETRICS & GYNECOLOGY

## 2021-02-23 PROCEDURE — 99999 PR PBB SHADOW E&M-EST. PATIENT-LVL III: CPT | Mod: PBBFAC,,, | Performed by: OBSTETRICS & GYNECOLOGY

## 2021-02-23 PROCEDURE — 99386 PREV VISIT NEW AGE 40-64: CPT | Mod: S$PBB,,, | Performed by: OBSTETRICS & GYNECOLOGY

## 2021-02-23 PROCEDURE — 88175 CYTOPATH C/V AUTO FLUID REDO: CPT

## 2021-03-02 LAB
CLINICAL INFO: NORMAL
CYTO CVX: NORMAL
CYTOLOGIST CVX/VAG CYTO: NORMAL
CYTOLOGY CMNT CVX/VAG CYTO-IMP: NORMAL
CYTOLOGY PAP THIN PREP EXPLANATION: NORMAL
DATE OF PREVIOUS PAP: NORMAL
DATE PREVIOUS BX: NO
HPV I/H RISK 4 DNA CVX QL NAA+PROBE: NOT DETECTED
LMP START DATE: NORMAL
SPECIMEN SOURCE CVX/VAG CYTO: NORMAL
STAT OF ADQ CVX/VAG CYTO-IMP: NORMAL

## 2021-03-03 ENCOUNTER — PATIENT MESSAGE (OUTPATIENT)
Dept: OBSTETRICS AND GYNECOLOGY | Facility: HOSPITAL | Age: 61
End: 2021-03-03

## 2021-03-08 ENCOUNTER — PATIENT MESSAGE (OUTPATIENT)
Dept: FAMILY MEDICINE | Facility: CLINIC | Age: 61
End: 2021-03-08

## 2021-03-09 ENCOUNTER — IMMUNIZATION (OUTPATIENT)
Dept: FAMILY MEDICINE | Facility: CLINIC | Age: 61
End: 2021-03-09
Payer: MEDICAID

## 2021-03-09 DIAGNOSIS — Z23 NEED FOR VACCINATION: Primary | ICD-10-CM

## 2021-03-09 PROCEDURE — 91300 COVID-19, MRNA, LNP-S, PF, 30 MCG/0.3 ML DOSE VACCINE: CPT | Mod: PBBFAC | Performed by: FAMILY MEDICINE

## 2021-03-30 ENCOUNTER — IMMUNIZATION (OUTPATIENT)
Dept: FAMILY MEDICINE | Facility: CLINIC | Age: 61
End: 2021-03-30
Payer: MEDICAID

## 2021-03-30 DIAGNOSIS — Z23 NEED FOR VACCINATION: Primary | ICD-10-CM

## 2021-03-30 PROCEDURE — 0002A COVID-19, MRNA, LNP-S, PF, 30 MCG/0.3 ML DOSE VACCINE: CPT | Mod: PBBFAC | Performed by: FAMILY MEDICINE

## 2021-03-30 PROCEDURE — 91300 COVID-19, MRNA, LNP-S, PF, 30 MCG/0.3 ML DOSE VACCINE: CPT | Mod: PBBFAC | Performed by: FAMILY MEDICINE

## 2021-05-11 ENCOUNTER — TELEPHONE (OUTPATIENT)
Dept: FAMILY MEDICINE | Facility: CLINIC | Age: 61
End: 2021-05-11

## 2021-05-11 DIAGNOSIS — M25.532 LEFT WRIST PAIN: Primary | ICD-10-CM

## 2021-05-11 DIAGNOSIS — M54.9 DORSALGIA, UNSPECIFIED: ICD-10-CM

## 2021-05-13 ENCOUNTER — LAB VISIT (OUTPATIENT)
Dept: LAB | Facility: HOSPITAL | Age: 61
End: 2021-05-13
Attending: PSYCHIATRY & NEUROLOGY
Payer: MEDICAID

## 2021-05-13 ENCOUNTER — HOSPITAL ENCOUNTER (OUTPATIENT)
Dept: RADIOLOGY | Facility: HOSPITAL | Age: 61
Discharge: HOME | End: 2021-05-13
Attending: FAMILY MEDICINE
Payer: MEDICAID

## 2021-05-13 ENCOUNTER — OFFICE VISIT (OUTPATIENT)
Dept: NEUROLOGY | Facility: CLINIC | Age: 61
End: 2021-05-13
Payer: MEDICAID

## 2021-05-13 VITALS
WEIGHT: 234.38 LBS | BODY MASS INDEX: 36.79 KG/M2 | RESPIRATION RATE: 20 BRPM | SYSTOLIC BLOOD PRESSURE: 130 MMHG | DIASTOLIC BLOOD PRESSURE: 86 MMHG | HEIGHT: 67 IN

## 2021-05-13 DIAGNOSIS — M54.9 DORSALGIA, UNSPECIFIED: ICD-10-CM

## 2021-05-13 DIAGNOSIS — L60.0 INGROWN TOENAIL OF RIGHT FOOT: ICD-10-CM

## 2021-05-13 DIAGNOSIS — M25.532 LEFT WRIST PAIN: ICD-10-CM

## 2021-05-13 DIAGNOSIS — R26.89 LOSS OF BALANCE: Primary | ICD-10-CM

## 2021-05-13 DIAGNOSIS — R26.89 LOSS OF BALANCE: ICD-10-CM

## 2021-05-13 DIAGNOSIS — M79.671 RIGHT FOOT PAIN: ICD-10-CM

## 2021-05-13 DIAGNOSIS — R27.0 ATAXIA, UNSPECIFIED: ICD-10-CM

## 2021-05-13 LAB — CERULOPLASMIN SERPL-MCNC: 38 MG/DL (ref 15–45)

## 2021-05-13 PROCEDURE — 99205 OFFICE O/P NEW HI 60 MIN: CPT | Mod: S$PBB,,, | Performed by: PSYCHIATRY & NEUROLOGY

## 2021-05-13 PROCEDURE — 99999 PR PBB SHADOW E&M-EST. PATIENT-LVL III: CPT | Mod: PBBFAC,,, | Performed by: PSYCHIATRY & NEUROLOGY

## 2021-05-13 PROCEDURE — 72110 X-RAY EXAM L-2 SPINE 4/>VWS: CPT | Mod: TC,FY,PO

## 2021-05-13 PROCEDURE — 99213 OFFICE O/P EST LOW 20 MIN: CPT | Mod: PBBFAC,25 | Performed by: PSYCHIATRY & NEUROLOGY

## 2021-05-13 PROCEDURE — 82607 VITAMIN B-12: CPT | Performed by: PSYCHIATRY & NEUROLOGY

## 2021-05-13 PROCEDURE — 82525 ASSAY OF COPPER: CPT | Performed by: PSYCHIATRY & NEUROLOGY

## 2021-05-13 PROCEDURE — 73110 X-RAY EXAM OF WRIST: CPT | Mod: TC,FY,PO,LT

## 2021-05-13 PROCEDURE — 84446 ASSAY OF VITAMIN E: CPT | Performed by: PSYCHIATRY & NEUROLOGY

## 2021-05-13 PROCEDURE — 36415 COLL VENOUS BLD VENIPUNCTURE: CPT | Performed by: PSYCHIATRY & NEUROLOGY

## 2021-05-13 PROCEDURE — 99205 PR OFFICE/OUTPT VISIT, NEW, LEVL V, 60-74 MIN: ICD-10-PCS | Mod: S$PBB,,, | Performed by: PSYCHIATRY & NEUROLOGY

## 2021-05-13 PROCEDURE — 83090 ASSAY OF HOMOCYSTEINE: CPT | Performed by: PSYCHIATRY & NEUROLOGY

## 2021-05-13 PROCEDURE — 82390 ASSAY OF CERULOPLASMIN: CPT | Performed by: PSYCHIATRY & NEUROLOGY

## 2021-05-13 PROCEDURE — 82746 ASSAY OF FOLIC ACID SERUM: CPT | Performed by: PSYCHIATRY & NEUROLOGY

## 2021-05-13 PROCEDURE — 83921 ORGANIC ACID SINGLE QUANT: CPT | Performed by: PSYCHIATRY & NEUROLOGY

## 2021-05-13 PROCEDURE — 99999 PR PBB SHADOW E&M-EST. PATIENT-LVL III: ICD-10-PCS | Mod: PBBFAC,,, | Performed by: PSYCHIATRY & NEUROLOGY

## 2021-05-13 RX ORDER — DIAZEPAM 5 MG/1
TABLET ORAL
Qty: 1 TABLET | Refills: 0 | Status: SHIPPED | OUTPATIENT
Start: 2021-05-13 | End: 2021-05-17

## 2021-05-14 ENCOUNTER — TELEPHONE (OUTPATIENT)
Dept: NEUROLOGY | Facility: CLINIC | Age: 61
End: 2021-05-14

## 2021-05-14 ENCOUNTER — TELEPHONE (OUTPATIENT)
Dept: FAMILY MEDICINE | Facility: CLINIC | Age: 61
End: 2021-05-14
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
## 2021-05-14 LAB
FOLATE SERPL-MCNC: 8.9 NG/ML (ref 4–24)
HCYS SERPL-SCNC: 6.4 UMOL/L (ref 4–15.5)
VIT B12 SERPL-MCNC: 577 PG/ML (ref 210–950)

## 2021-05-17 ENCOUNTER — TELEPHONE (OUTPATIENT)
Dept: FAMILY MEDICINE | Facility: CLINIC | Age: 61
End: 2021-05-17

## 2021-05-17 ENCOUNTER — TELEPHONE (OUTPATIENT)
Dept: NEUROLOGY | Facility: CLINIC | Age: 61
End: 2021-05-17

## 2021-05-17 ENCOUNTER — PROCEDURE VISIT (OUTPATIENT)
Dept: NEUROLOGY | Facility: CLINIC | Age: 61
End: 2021-05-17
Payer: MEDICAID

## 2021-05-17 DIAGNOSIS — R27.0 ATAXIA, UNSPECIFIED: ICD-10-CM

## 2021-05-17 DIAGNOSIS — R26.89 LOSS OF BALANCE: Primary | ICD-10-CM

## 2021-05-17 DIAGNOSIS — M79.671 RIGHT FOOT PAIN: ICD-10-CM

## 2021-05-17 DIAGNOSIS — L60.0 INGROWN TOENAIL OF RIGHT FOOT: ICD-10-CM

## 2021-05-17 LAB — COPPER SERPL-MCNC: 1772 UG/L (ref 810–1990)

## 2021-05-17 PROCEDURE — 95912 NRV CNDJ TEST 11-12 STUDIES: CPT | Mod: 26,S$PBB,, | Performed by: PSYCHIATRY & NEUROLOGY

## 2021-05-17 PROCEDURE — 95912 NRV CNDJ TEST 11-12 STUDIES: CPT | Mod: PBBFAC | Performed by: PSYCHIATRY & NEUROLOGY

## 2021-05-17 PROCEDURE — 95912 PR NERVE CONDUCTION STUDY; 11 -12 STUDIES: ICD-10-PCS | Mod: 26,S$PBB,, | Performed by: PSYCHIATRY & NEUROLOGY

## 2021-05-18 ENCOUNTER — TELEPHONE (OUTPATIENT)
Dept: NEUROLOGY | Facility: CLINIC | Age: 61
End: 2021-05-18

## 2021-05-18 LAB
A-TOCOPHEROL VIT E SERPL-MCNC: 1409 UG/DL (ref 500–1800)
METHYLMALONATE SERPL-SCNC: 0.16 UMOL/L

## 2021-05-21 ENCOUNTER — OFFICE VISIT (OUTPATIENT)
Dept: NEUROLOGY | Facility: CLINIC | Age: 61
End: 2021-05-21
Payer: MEDICAID

## 2021-05-21 VITALS
HEART RATE: 72 BPM | HEIGHT: 67 IN | WEIGHT: 234.13 LBS | BODY MASS INDEX: 36.75 KG/M2 | DIASTOLIC BLOOD PRESSURE: 80 MMHG | RESPIRATION RATE: 16 BRPM | SYSTOLIC BLOOD PRESSURE: 128 MMHG

## 2021-05-21 DIAGNOSIS — R27.0 ATAXIA, UNSPECIFIED: ICD-10-CM

## 2021-05-21 DIAGNOSIS — M79.671 RIGHT FOOT PAIN: ICD-10-CM

## 2021-05-21 DIAGNOSIS — R26.89 LOSS OF BALANCE: Primary | ICD-10-CM

## 2021-05-21 DIAGNOSIS — G56.00 CARPAL TUNNEL SYNDROME, UNSPECIFIED LATERALITY: ICD-10-CM

## 2021-05-21 DIAGNOSIS — L60.0 INGROWN TOENAIL OF RIGHT FOOT: ICD-10-CM

## 2021-05-21 PROCEDURE — 99999 PR PBB SHADOW E&M-EST. PATIENT-LVL IV: ICD-10-PCS | Mod: PBBFAC,,, | Performed by: NURSE PRACTITIONER

## 2021-05-21 PROCEDURE — 99214 OFFICE O/P EST MOD 30 MIN: CPT | Mod: S$PBB,,, | Performed by: NURSE PRACTITIONER

## 2021-05-21 PROCEDURE — 99214 PR OFFICE/OUTPT VISIT, EST, LEVL IV, 30-39 MIN: ICD-10-PCS | Mod: S$PBB,,, | Performed by: NURSE PRACTITIONER

## 2021-05-21 PROCEDURE — 99999 PR PBB SHADOW E&M-EST. PATIENT-LVL IV: CPT | Mod: PBBFAC,,, | Performed by: NURSE PRACTITIONER

## 2021-05-21 PROCEDURE — 99214 OFFICE O/P EST MOD 30 MIN: CPT | Mod: PBBFAC | Performed by: NURSE PRACTITIONER

## 2021-06-03 ENCOUNTER — TELEPHONE (OUTPATIENT)
Dept: FAMILY MEDICINE | Facility: CLINIC | Age: 61
End: 2021-06-03

## 2021-06-03 RX ORDER — NEOMYCIN SULFATE, POLYMYXIN B SULFATE AND HYDROCORTISONE 10; 3.5; 1 MG/ML; MG/ML; [USP'U]/ML
3 SUSPENSION/ DROPS AURICULAR (OTIC) 3 TIMES DAILY
Qty: 10 ML | Refills: 2 | Status: SHIPPED | OUTPATIENT
Start: 2021-06-03 | End: 2021-08-17

## 2021-06-14 ENCOUNTER — OFFICE VISIT (OUTPATIENT)
Dept: FAMILY MEDICINE | Facility: CLINIC | Age: 61
End: 2021-06-14
Payer: MEDICAID

## 2021-06-14 VITALS
HEART RATE: 104 BPM | HEIGHT: 67 IN | OXYGEN SATURATION: 96 % | TEMPERATURE: 99 F | BODY MASS INDEX: 35.79 KG/M2 | DIASTOLIC BLOOD PRESSURE: 64 MMHG | SYSTOLIC BLOOD PRESSURE: 106 MMHG | WEIGHT: 228 LBS

## 2021-06-14 DIAGNOSIS — H61.23 BILATERAL IMPACTED CERUMEN: Primary | ICD-10-CM

## 2021-06-14 DIAGNOSIS — H60.331 ACUTE SWIMMER'S EAR OF RIGHT SIDE: ICD-10-CM

## 2021-06-14 PROCEDURE — 99213 OFFICE O/P EST LOW 20 MIN: CPT | Mod: S$GLB,,, | Performed by: FAMILY MEDICINE

## 2021-06-14 PROCEDURE — 99213 PR OFFICE/OUTPT VISIT, EST, LEVL III, 20-29 MIN: ICD-10-PCS | Mod: S$GLB,,, | Performed by: FAMILY MEDICINE

## 2021-08-16 ENCOUNTER — TELEPHONE (OUTPATIENT)
Dept: FAMILY MEDICINE | Facility: CLINIC | Age: 61
End: 2021-08-16

## 2021-08-17 ENCOUNTER — OFFICE VISIT (OUTPATIENT)
Dept: FAMILY MEDICINE | Facility: CLINIC | Age: 61
End: 2021-08-17
Payer: MEDICAID

## 2021-08-17 VITALS
OXYGEN SATURATION: 96 % | HEART RATE: 81 BPM | BODY MASS INDEX: 34.53 KG/M2 | WEIGHT: 220 LBS | TEMPERATURE: 98 F | HEIGHT: 67 IN | DIASTOLIC BLOOD PRESSURE: 70 MMHG | SYSTOLIC BLOOD PRESSURE: 122 MMHG

## 2021-08-17 DIAGNOSIS — Z12.11 COLON CANCER SCREENING: ICD-10-CM

## 2021-08-17 DIAGNOSIS — B96.89 SKIN INFECTION, BACTERIAL: Primary | ICD-10-CM

## 2021-08-17 DIAGNOSIS — L08.9 SKIN INFECTION, BACTERIAL: Primary | ICD-10-CM

## 2021-08-17 DIAGNOSIS — M79.672 LEFT FOOT PAIN: ICD-10-CM

## 2021-08-17 PROCEDURE — 99213 PR OFFICE/OUTPT VISIT, EST, LEVL III, 20-29 MIN: ICD-10-PCS | Mod: S$GLB,,, | Performed by: FAMILY MEDICINE

## 2021-08-17 PROCEDURE — 99213 OFFICE O/P EST LOW 20 MIN: CPT | Mod: S$GLB,,, | Performed by: FAMILY MEDICINE

## 2021-08-17 RX ORDER — MUPIROCIN 20 MG/G
OINTMENT TOPICAL DAILY
Qty: 30 G | Refills: 1 | Status: SHIPPED | OUTPATIENT
Start: 2021-08-17 | End: 2021-08-27

## 2021-08-17 RX ORDER — ALPRAZOLAM 0.5 MG/1
0.5 TABLET ORAL DAILY PRN
Qty: 30 TABLET | Refills: 0 | Status: SHIPPED | OUTPATIENT
Start: 2021-08-17 | End: 2021-12-20

## 2021-08-17 RX ORDER — SULFAMETHOXAZOLE AND TRIMETHOPRIM 800; 160 MG/1; MG/1
1 TABLET ORAL 2 TIMES DAILY
Qty: 20 TABLET | Refills: 0 | Status: SHIPPED | OUTPATIENT
Start: 2021-08-17 | End: 2021-08-27

## 2021-08-19 ENCOUNTER — PATIENT OUTREACH (OUTPATIENT)
Dept: ADMINISTRATIVE | Facility: OTHER | Age: 61
End: 2021-08-19

## 2021-08-20 ENCOUNTER — TELEPHONE (OUTPATIENT)
Dept: FAMILY MEDICINE | Facility: CLINIC | Age: 61
End: 2021-08-20

## 2021-08-20 RX ORDER — CLOBETASOL PROPIONATE 0.5 MG/G
CREAM TOPICAL 2 TIMES DAILY
Qty: 60 G | Refills: 5 | Status: CANCELLED | OUTPATIENT
Start: 2021-08-20 | End: 2021-08-30

## 2021-08-20 RX ORDER — BETAMETHASONE DIPROPIONATE 0.5 MG/G
OINTMENT TOPICAL 2 TIMES DAILY
Qty: 45 G | Refills: 0 | Status: SHIPPED | OUTPATIENT
Start: 2021-08-20 | End: 2024-01-19

## 2021-10-19 ENCOUNTER — PATIENT OUTREACH (OUTPATIENT)
Dept: ADMINISTRATIVE | Facility: OTHER | Age: 61
End: 2021-10-19

## 2021-10-21 ENCOUNTER — TELEPHONE (OUTPATIENT)
Dept: FAMILY MEDICINE | Facility: CLINIC | Age: 61
End: 2021-10-21

## 2021-10-21 ENCOUNTER — OFFICE VISIT (OUTPATIENT)
Dept: PODIATRY | Facility: CLINIC | Age: 61
End: 2021-10-21
Payer: COMMERCIAL

## 2021-10-21 VITALS
DIASTOLIC BLOOD PRESSURE: 80 MMHG | SYSTOLIC BLOOD PRESSURE: 124 MMHG | HEART RATE: 72 BPM | BODY MASS INDEX: 34.53 KG/M2 | WEIGHT: 220 LBS | HEIGHT: 67 IN

## 2021-10-21 DIAGNOSIS — M20.12 HAV (HALLUX ABDUCTO VALGUS), LEFT: ICD-10-CM

## 2021-10-21 DIAGNOSIS — Z00.00 WELLNESS EXAMINATION: Primary | ICD-10-CM

## 2021-10-21 DIAGNOSIS — M77.32 BONE SPUR OF POSTERIOR PORTION OF LEFT CALCANEUS: ICD-10-CM

## 2021-10-21 DIAGNOSIS — M25.571 RIGHT ANKLE PAIN, UNSPECIFIED CHRONICITY: Primary | ICD-10-CM

## 2021-10-21 DIAGNOSIS — M20.22 HALLUX RIGIDUS OF LEFT FOOT: ICD-10-CM

## 2021-10-21 DIAGNOSIS — M76.62 ACHILLES TENDINITIS OF LEFT LOWER EXTREMITY: ICD-10-CM

## 2021-10-21 PROCEDURE — 99204 PR OFFICE/OUTPT VISIT, NEW, LEVL IV, 45-59 MIN: ICD-10-PCS | Mod: S$GLB,,, | Performed by: PODIATRIST

## 2021-10-21 PROCEDURE — 99213 OFFICE O/P EST LOW 20 MIN: CPT | Mod: PBBFAC,PN | Performed by: PODIATRIST

## 2021-10-21 PROCEDURE — 99204 OFFICE O/P NEW MOD 45 MIN: CPT | Mod: S$GLB,,, | Performed by: PODIATRIST

## 2021-10-21 PROCEDURE — 99999 PR PBB SHADOW E&M-EST. PATIENT-LVL III: CPT | Mod: PBBFAC,,, | Performed by: PODIATRIST

## 2021-10-21 PROCEDURE — 99999 PR PBB SHADOW E&M-EST. PATIENT-LVL III: ICD-10-PCS | Mod: PBBFAC,,, | Performed by: PODIATRIST

## 2021-10-21 RX ORDER — NAPROXEN 500 MG/1
500 TABLET ORAL 2 TIMES DAILY
Qty: 50 TABLET | Refills: 0 | Status: SHIPPED | OUTPATIENT
Start: 2021-10-21 | End: 2021-12-20

## 2021-11-30 ENCOUNTER — OFFICE VISIT (OUTPATIENT)
Dept: FAMILY MEDICINE | Facility: CLINIC | Age: 61
End: 2021-11-30
Payer: COMMERCIAL

## 2021-11-30 ENCOUNTER — TELEPHONE (OUTPATIENT)
Dept: FAMILY MEDICINE | Facility: CLINIC | Age: 61
End: 2021-11-30
Payer: COMMERCIAL

## 2021-11-30 VITALS
TEMPERATURE: 98 F | SYSTOLIC BLOOD PRESSURE: 118 MMHG | DIASTOLIC BLOOD PRESSURE: 80 MMHG | BODY MASS INDEX: 36.29 KG/M2 | HEIGHT: 67 IN | WEIGHT: 231.25 LBS | OXYGEN SATURATION: 98 % | HEART RATE: 80 BPM

## 2021-11-30 DIAGNOSIS — R10.12 LEFT UPPER QUADRANT PAIN: ICD-10-CM

## 2021-11-30 DIAGNOSIS — Z00.00 WELLNESS EXAMINATION: Primary | ICD-10-CM

## 2021-11-30 DIAGNOSIS — R42 VERTIGO: ICD-10-CM

## 2021-11-30 DIAGNOSIS — L92.0 GRANULOMA ANNULARE: ICD-10-CM

## 2021-11-30 DIAGNOSIS — L82.1 SEBORRHEIC KERATOSES: ICD-10-CM

## 2021-11-30 DIAGNOSIS — R10.12 LEFT UPPER QUADRANT ABDOMINAL PAIN: ICD-10-CM

## 2021-11-30 DIAGNOSIS — B36.0 TINEA VERSICOLOR: ICD-10-CM

## 2021-11-30 PROCEDURE — 3074F SYST BP LT 130 MM HG: CPT | Mod: CPTII,S$GLB,, | Performed by: FAMILY MEDICINE

## 2021-11-30 PROCEDURE — 99214 PR OFFICE/OUTPT VISIT, EST, LEVL IV, 30-39 MIN: ICD-10-PCS | Mod: S$GLB,,, | Performed by: FAMILY MEDICINE

## 2021-11-30 PROCEDURE — 3008F PR BODY MASS INDEX (BMI) DOCUMENTED: ICD-10-PCS | Mod: CPTII,S$GLB,, | Performed by: FAMILY MEDICINE

## 2021-11-30 PROCEDURE — 3008F BODY MASS INDEX DOCD: CPT | Mod: CPTII,S$GLB,, | Performed by: FAMILY MEDICINE

## 2021-11-30 PROCEDURE — 3079F DIAST BP 80-89 MM HG: CPT | Mod: CPTII,S$GLB,, | Performed by: FAMILY MEDICINE

## 2021-11-30 PROCEDURE — 3079F PR MOST RECENT DIASTOLIC BLOOD PRESSURE 80-89 MM HG: ICD-10-PCS | Mod: CPTII,S$GLB,, | Performed by: FAMILY MEDICINE

## 2021-11-30 PROCEDURE — 99214 OFFICE O/P EST MOD 30 MIN: CPT | Mod: S$GLB,,, | Performed by: FAMILY MEDICINE

## 2021-11-30 PROCEDURE — 3074F PR MOST RECENT SYSTOLIC BLOOD PRESSURE < 130 MM HG: ICD-10-PCS | Mod: CPTII,S$GLB,, | Performed by: FAMILY MEDICINE

## 2021-11-30 RX ORDER — HYDROCORTISONE 25 MG/G
CREAM TOPICAL
Qty: 28 G | Refills: 11 | Status: SHIPPED | OUTPATIENT
Start: 2021-11-30 | End: 2022-01-10 | Stop reason: SDUPTHER

## 2021-11-30 RX ORDER — MECLIZINE HYDROCHLORIDE 25 MG/1
25 TABLET ORAL 3 TIMES DAILY PRN
Qty: 100 TABLET | Refills: 11 | Status: SHIPPED | OUTPATIENT
Start: 2021-11-30 | End: 2023-04-28

## 2021-11-30 NOTE — PROGRESS NOTES
"Subjective:      Patient ID: Kentrell Aguilera is a 61 y.o. female.    Chief Complaint: hernia      Vitals:    11/30/21 1311   BP: 118/80   Pulse: 80   Temp: 97.9 °F (36.6 °C)   SpO2: 98%   Weight: 104.9 kg (231 lb 4.2 oz)   Height: 5' 7" (1.702 m)        HPI   abd pain LLQ for 4 days; needs hold the hernia is left abdomen; better today; no fever; no diarrhea, bleeding; voids ok; was constipated last week, this has been fixed  Hernia for 8 months, this is pt's diagnosis for the hernia  Rash upper back for months; itches occ  Problem List  Patient Active Problem List   Diagnosis    Right foot pain    Ingrown toenail of right foot    Loss of balance    Ataxia, unspecified    Carpal tunnel syndrome        ALLERGIES:   Review of patient's allergies indicates:   Allergen Reactions    Erythromycin     Pcn [penicillins]        MEDS:   Current Outpatient Medications:     betamethasone dipropionate (DIPROLENE) 0.05 % ointment, Apply topically 2 (two) times daily. for 10 days, Disp: 45 g, Rfl: 0    ALPRAZolam (XANAX) 0.5 MG tablet, Take 1 tablet (0.5 mg total) by mouth daily as needed for Anxiety., Disp: 30 tablet, Rfl: 0    hydrocortisone 2.5 % cream, APPLY  CREAM TO RASH TWICE DAILY, Disp: 28 g, Rfl: 11    meclizine (ANTIVERT) 25 mg tablet, Take 1 tablet (25 mg total) by mouth 3 (three) times daily as needed., Disp: 100 tablet, Rfl: 11    naproxen (NAPROSYN) 500 MG tablet, Take 1 tablet (500 mg total) by mouth 2 (two) times a day. (Patient not taking: Reported on 11/30/2021), Disp: 50 tablet, Rfl: 0      History:  Current Providers as of 11/30/2021  PCP: Rayray Mann MD  Care Team Provider: Donnell Mann MD  Care Team Provider: Felton Meraz LPN  Care Team Provider: Herman Santos LPN  Care Team Provider: Traci Kamara LPN  Encounter Provider: Rayray Mann MD, starting on Tue Nov 30, 2021 12:00 AM  Referring Provider: not found, starting on Tue Nov 30, 2021 12:00 AM  Consulting Physician: Rayray MOURA" MD Mackenzie, starting on   1:10 PM (Active)   History reviewed. No pertinent past medical history.  Past Surgical History:   Procedure Laterality Date    BREAST BIOPSY Left     negative  negative     Social History     Tobacco Use    Smoking status: Former Smoker     Packs/day: 0.50     Types: Cigarettes     Quit date:      Years since quittin.9    Smokeless tobacco: Never Used   Substance Use Topics    Alcohol use: Yes         Review of Systems   Constitutional: Negative.    HENT: Negative.    Respiratory: Negative.    Cardiovascular: Negative.    Gastrointestinal: Positive for abdominal pain.   Endocrine: Negative.    Genitourinary: Negative.    Musculoskeletal: Negative.    Skin: Positive for rash.        Granuloma annulare on hands   Psychiatric/Behavioral: Negative.    All other systems reviewed and are negative.    Objective:     Physical Exam  Vitals and nursing note reviewed.   Constitutional:       Appearance: She is well-developed and well-nourished. She is obese.   HENT:      Head: Normocephalic.   Eyes:      Extraocular Movements: EOM normal.      Conjunctiva/sclera: Conjunctivae normal.      Pupils: Pupils are equal, round, and reactive to light.   Cardiovascular:      Rate and Rhythm: Normal rate and regular rhythm.      Heart sounds: Normal heart sounds.   Pulmonary:      Effort: Pulmonary effort is normal.      Breath sounds: Normal breath sounds.   Abdominal:      General: Bowel sounds are normal.      Palpations: Abdomen is soft.      Tenderness: There is abdominal tenderness.   Musculoskeletal:         General: Normal range of motion.      Cervical back: Normal range of motion and neck supple.   Skin:     General: Skin is warm and dry.      Comments: Tinea versciolor  granulam annularae   Neurological:      Mental Status: She is alert and oriented to person, place, and time.      Deep Tendon Reflexes: Reflexes are normal and symmetric.   Psychiatric:         Mood  and Affect: Mood and affect normal.         Behavior: Behavior normal.         Thought Content: Thought content normal.         Judgment: Judgment normal.             Assessment:     No diagnosis found.  Plan:        Medication List          Accurate as of November 30, 2021  1:29 PM. If you have any questions, ask your nurse or doctor.            START taking these medications    hydrocortisone 2.5 % cream  APPLY  CREAM TO RASH TWICE DAILY  Started by: Rayray Mann MD     meclizine 25 mg tablet  Commonly known as: ANTIVERT  Take 1 tablet (25 mg total) by mouth 3 (three) times daily as needed.  Started by: Rayray Mann MD        CONTINUE taking these medications    ALPRAZolam 0.5 MG tablet  Commonly known as: XANAX  Take 1 tablet (0.5 mg total) by mouth daily as needed for Anxiety.     betamethasone dipropionate 0.05 % ointment  Commonly known as: DIPROLENE  Apply topically 2 (two) times daily. for 10 days     naproxen 500 MG tablet  Commonly known as: NAPROSYN  Take 1 tablet (500 mg total) by mouth 2 (two) times a day.        STOP taking these medications    acyclovir 400 MG tablet  Commonly known as: ZOVIRAX  Stopped by: Rayray Mann MD     hydrocortisone 0.2 % cream  Commonly known as: WESTCORT  Stopped by: Rayray Mann MD     methylPREDNISolone 4 mg tablet  Commonly known as: MEDROL DOSEPACK  Stopped by: Rayray Mann MD           Where to Get Your Medications      These medications were sent to [facility] [address] [place] [address] [address] [facility]  [address]  [facility] [address]    Phone: [phone]   · hydrocortisone 2.5 % cream  · meclizine 25 mg tablet       There are no diagnoses linked to this encounter.

## 2021-12-02 ENCOUNTER — HOSPITAL ENCOUNTER (OUTPATIENT)
Dept: RADIOLOGY | Facility: HOSPITAL | Age: 61
Discharge: HOME | End: 2021-12-02
Attending: FAMILY MEDICINE
Payer: COMMERCIAL

## 2021-12-02 DIAGNOSIS — R10.12 LEFT UPPER QUADRANT PAIN: ICD-10-CM

## 2021-12-02 PROCEDURE — 74176 CT ABD & PELVIS W/O CONTRAST: CPT | Mod: TC,PO

## 2021-12-20 ENCOUNTER — OFFICE VISIT (OUTPATIENT)
Dept: FAMILY MEDICINE | Facility: CLINIC | Age: 61
End: 2021-12-20
Payer: COMMERCIAL

## 2021-12-20 VITALS
HEIGHT: 67 IN | BODY MASS INDEX: 35.54 KG/M2 | DIASTOLIC BLOOD PRESSURE: 80 MMHG | HEART RATE: 76 BPM | OXYGEN SATURATION: 97 % | SYSTOLIC BLOOD PRESSURE: 118 MMHG | WEIGHT: 226.44 LBS | TEMPERATURE: 99 F

## 2021-12-20 DIAGNOSIS — H25.9 AGE-RELATED CATARACT OF BOTH EYES, UNSPECIFIED AGE-RELATED CATARACT TYPE: ICD-10-CM

## 2021-12-20 DIAGNOSIS — N28.1 RENAL CYST, ACQUIRED, RIGHT: ICD-10-CM

## 2021-12-20 DIAGNOSIS — Z01.818 PRE-OP EVALUATION: Primary | ICD-10-CM

## 2021-12-20 DIAGNOSIS — Z00.00 WELLNESS EXAMINATION: ICD-10-CM

## 2021-12-20 PROCEDURE — 99214 OFFICE O/P EST MOD 30 MIN: CPT | Mod: S$GLB,,, | Performed by: FAMILY MEDICINE

## 2021-12-20 PROCEDURE — 99214 PR OFFICE/OUTPT VISIT, EST, LEVL IV, 30-39 MIN: ICD-10-PCS | Mod: S$GLB,,, | Performed by: FAMILY MEDICINE

## 2021-12-20 RX ORDER — OFLOXACIN 3 MG/ML
SOLUTION/ DROPS OPHTHALMIC
COMMUNITY
Start: 2021-12-03 | End: 2024-01-19

## 2021-12-20 RX ORDER — BROMFENAC SODIUM 0.7 MG/ML
SOLUTION/ DROPS OPHTHALMIC
COMMUNITY
Start: 2021-12-03 | End: 2024-01-19

## 2021-12-28 ENCOUNTER — TELEPHONE (OUTPATIENT)
Dept: ENDOSCOPY | Facility: HOSPITAL | Age: 61
End: 2021-12-28
Payer: MEDICAID

## 2021-12-29 ENCOUNTER — TELEPHONE (OUTPATIENT)
Dept: FAMILY MEDICINE | Facility: CLINIC | Age: 61
End: 2021-12-29
Payer: MEDICAID

## 2021-12-29 DIAGNOSIS — Z12.31 ENCOUNTER FOR SCREENING MAMMOGRAM FOR MALIGNANT NEOPLASM OF BREAST: Primary | ICD-10-CM

## 2022-01-10 ENCOUNTER — HOSPITAL ENCOUNTER (OUTPATIENT)
Dept: RADIOLOGY | Facility: HOSPITAL | Age: 62
Discharge: HOME | End: 2022-01-10
Attending: FAMILY MEDICINE
Payer: COMMERCIAL

## 2022-01-10 ENCOUNTER — PATIENT MESSAGE (OUTPATIENT)
Dept: ADMINISTRATIVE | Facility: HOSPITAL | Age: 62
End: 2022-01-10
Payer: MEDICAID

## 2022-01-10 DIAGNOSIS — Z12.31 ENCOUNTER FOR SCREENING MAMMOGRAM FOR MALIGNANT NEOPLASM OF BREAST: ICD-10-CM

## 2022-01-10 PROCEDURE — 77067 SCR MAMMO BI INCL CAD: CPT | Mod: TC,PO

## 2022-01-17 ENCOUNTER — TELEPHONE (OUTPATIENT)
Dept: FAMILY MEDICINE | Facility: CLINIC | Age: 62
End: 2022-01-17
Payer: MEDICAID

## 2022-01-17 NOTE — TELEPHONE ENCOUNTER
Her AVS says reason for mammogram was Herpes Zoster, according to radiology.  Can you get them to change that?

## 2022-01-21 ENCOUNTER — TELEPHONE (OUTPATIENT)
Dept: FAMILY MEDICINE | Facility: CLINIC | Age: 62
End: 2022-01-21
Payer: MEDICAID

## 2022-01-24 ENCOUNTER — PATIENT MESSAGE (OUTPATIENT)
Dept: FAMILY MEDICINE | Facility: CLINIC | Age: 62
End: 2022-01-24
Payer: MEDICAID

## 2022-01-24 ENCOUNTER — TELEPHONE (OUTPATIENT)
Dept: FAMILY MEDICINE | Facility: CLINIC | Age: 62
End: 2022-01-24
Payer: MEDICAID

## 2022-02-16 ENCOUNTER — PATIENT OUTREACH (OUTPATIENT)
Dept: ADMINISTRATIVE | Facility: OTHER | Age: 62
End: 2022-02-16
Payer: MEDICAID

## 2022-02-16 DIAGNOSIS — Z12.11 ENCOUNTER FOR FIT (FECAL IMMUNOCHEMICAL TEST) SCREENING: Primary | ICD-10-CM

## 2022-02-16 NOTE — PROGRESS NOTES
Health Maintenance Due   Topic Date Due    HIV Screening  Never done    Colorectal Cancer Screening  07/07/2021     Updates were requested from care everywhere.  Chart was reviewed for overdue Proactive [facility] Encounters (MIQUEL) topics (CRS, Breast Cancer Screening, Eye exam)  Health Maintenance has been updated.  LINKS immunization registry triggered.  Immunizations were reconciled.  Order placed for FIT kit.

## 2022-02-17 ENCOUNTER — OFFICE VISIT (OUTPATIENT)
Dept: UROLOGY | Facility: CLINIC | Age: 62
End: 2022-02-17
Payer: COMMERCIAL

## 2022-02-17 VITALS
BODY MASS INDEX: 36.68 KG/M2 | HEIGHT: 67 IN | DIASTOLIC BLOOD PRESSURE: 82 MMHG | HEART RATE: 78 BPM | SYSTOLIC BLOOD PRESSURE: 152 MMHG | WEIGHT: 233.69 LBS

## 2022-02-17 DIAGNOSIS — N28.1 RENAL CYST, ACQUIRED, RIGHT: ICD-10-CM

## 2022-02-17 PROCEDURE — 3008F BODY MASS INDEX DOCD: CPT | Mod: CPTII,S$GLB,, | Performed by: STUDENT IN AN ORGANIZED HEALTH CARE EDUCATION/TRAINING PROGRAM

## 2022-02-17 PROCEDURE — 1159F MED LIST DOCD IN RCRD: CPT | Mod: CPTII,S$GLB,, | Performed by: STUDENT IN AN ORGANIZED HEALTH CARE EDUCATION/TRAINING PROGRAM

## 2022-02-17 PROCEDURE — 99214 OFFICE O/P EST MOD 30 MIN: CPT | Mod: PBBFAC,PO | Performed by: STUDENT IN AN ORGANIZED HEALTH CARE EDUCATION/TRAINING PROGRAM

## 2022-02-17 PROCEDURE — 1160F PR REVIEW ALL MEDS BY PRESCRIBER/CLIN PHARMACIST DOCUMENTED: ICD-10-PCS | Mod: CPTII,S$GLB,, | Performed by: STUDENT IN AN ORGANIZED HEALTH CARE EDUCATION/TRAINING PROGRAM

## 2022-02-17 PROCEDURE — 99204 PR OFFICE/OUTPT VISIT, NEW, LEVL IV, 45-59 MIN: ICD-10-PCS | Mod: S$GLB,,, | Performed by: STUDENT IN AN ORGANIZED HEALTH CARE EDUCATION/TRAINING PROGRAM

## 2022-02-17 PROCEDURE — 3079F PR MOST RECENT DIASTOLIC BLOOD PRESSURE 80-89 MM HG: ICD-10-PCS | Mod: CPTII,S$GLB,, | Performed by: STUDENT IN AN ORGANIZED HEALTH CARE EDUCATION/TRAINING PROGRAM

## 2022-02-17 PROCEDURE — 99999 PR PBB SHADOW E&M-EST. PATIENT-LVL IV: CPT | Mod: PBBFAC,,, | Performed by: STUDENT IN AN ORGANIZED HEALTH CARE EDUCATION/TRAINING PROGRAM

## 2022-02-17 PROCEDURE — 1159F PR MEDICATION LIST DOCUMENTED IN MEDICAL RECORD: ICD-10-PCS | Mod: CPTII,S$GLB,, | Performed by: STUDENT IN AN ORGANIZED HEALTH CARE EDUCATION/TRAINING PROGRAM

## 2022-02-17 PROCEDURE — 3008F PR BODY MASS INDEX (BMI) DOCUMENTED: ICD-10-PCS | Mod: CPTII,S$GLB,, | Performed by: STUDENT IN AN ORGANIZED HEALTH CARE EDUCATION/TRAINING PROGRAM

## 2022-02-17 PROCEDURE — 1160F RVW MEDS BY RX/DR IN RCRD: CPT | Mod: CPTII,S$GLB,, | Performed by: STUDENT IN AN ORGANIZED HEALTH CARE EDUCATION/TRAINING PROGRAM

## 2022-02-17 PROCEDURE — 3077F SYST BP >= 140 MM HG: CPT | Mod: CPTII,S$GLB,, | Performed by: STUDENT IN AN ORGANIZED HEALTH CARE EDUCATION/TRAINING PROGRAM

## 2022-02-17 PROCEDURE — 99204 OFFICE O/P NEW MOD 45 MIN: CPT | Mod: S$GLB,,, | Performed by: STUDENT IN AN ORGANIZED HEALTH CARE EDUCATION/TRAINING PROGRAM

## 2022-02-17 PROCEDURE — 3079F DIAST BP 80-89 MM HG: CPT | Mod: CPTII,S$GLB,, | Performed by: STUDENT IN AN ORGANIZED HEALTH CARE EDUCATION/TRAINING PROGRAM

## 2022-02-17 PROCEDURE — 99999 PR PBB SHADOW E&M-EST. PATIENT-LVL IV: ICD-10-PCS | Mod: PBBFAC,,, | Performed by: STUDENT IN AN ORGANIZED HEALTH CARE EDUCATION/TRAINING PROGRAM

## 2022-02-17 PROCEDURE — 3077F PR MOST RECENT SYSTOLIC BLOOD PRESSURE >= 140 MM HG: ICD-10-PCS | Mod: CPTII,S$GLB,, | Performed by: STUDENT IN AN ORGANIZED HEALTH CARE EDUCATION/TRAINING PROGRAM

## 2022-02-17 NOTE — Clinical Note
No concerning features on the renal cyst. I actually looked back and you could see part of it back on a CTA of the chest you ordered in 2019 - so I showed that image to the pt as well to offer more reassurance. Sincerely, Lottie

## 2022-02-17 NOTE — PROGRESS NOTES
Subjective:       Patient ID: Kentrell Aguilera is a 61 y.o. female.    Chief Complaint:  Renal cyst  This is a 61 y.o.  female patient that is new to me.  The patient was referred to me by Dr. phillips for renal cyst. She states it was incidentally discovered. CT on 21 was ordered for left anterior upper quadrant pain. Ct 21 - Kidneys/Ureters: No mass, hydroureteronephrosis, or nephroureterolithiasis.  Large cyst exophytic off the upper pole of the right kidney measuring up to 6 cm.      She was a previous athlete - swimmer, basketball               Lab Results   Component Value Date    CREATININE 0.61 2020       ---  History reviewed. No pertinent past medical history.    Past Surgical History:   Procedure Laterality Date    BREAST BIOPSY Left     negative  negative       Family History   Problem Relation Age of Onset    COPD Mother     Cancer Mother         lung    Coronary artery disease Father     Diabetes Father     Hypertension Sister     No Known Problems Brother     COPD Sister        Social History     Tobacco Use    Smoking status: Former Smoker     Packs/day: 0.50     Types: Cigarettes     Quit date:      Years since quittin.1    Smokeless tobacco: Never Used   Substance Use Topics    Alcohol use: Yes       Current Outpatient Medications on File Prior to Visit   Medication Sig Dispense Refill    hydrocortisone 2.5 % cream APPLY  CREAM TO RASH TWICE DAILY 28 g 11    meclizine (ANTIVERT) 25 mg tablet Take 1 tablet (25 mg total) by mouth 3 (three) times daily as needed. 100 tablet 11    ofloxacin (OCUFLOX) 0.3 % ophthalmic solution       PROLENSA 0.07 % Drop SMARTSIG:In Eye(s)      betamethasone dipropionate (DIPROLENE) 0.05 % ointment Apply topically 2 (two) times daily. for 10 days 45 g 0     No current facility-administered medications on file prior to visit.       Review of patient's allergies indicates:   Allergen Reactions    Erythromycin     Pcn [penicillins]         Review of Systems   Constitutional: Negative for activity change.   HENT: Negative for congestion.    Eyes: Negative for visual disturbance.   Respiratory: Negative for shortness of breath.    Cardiovascular: Negative for chest pain.   Gastrointestinal: Negative for abdominal distention.   Musculoskeletal: Negative for gait problem.   Skin: Negative for color change.   Neurological: Negative for dizziness.   Psychiatric/Behavioral: Negative for agitation.       Objective:      Physical Exam  Constitutional:       Appearance: She is well-developed.   HENT:      Head: Normocephalic and atraumatic.   Eyes:      Extraocular Movements: EOM normal.   Cardiovascular:      Pulses: Intact distal pulses.   Pulmonary:      Effort: Pulmonary effort is normal.   Musculoskeletal:         General: Normal range of motion.      Cervical back: Normal range of motion.   Skin:     General: Skin is warm and dry.   Neurological:      Mental Status: She is alert and oriented to person, place, and time.   Psychiatric:         Mood and Affect: Mood and affect normal.         Assessment:       1. Renal cyst, acquired, right        Plan:       Plan:  1. Reviewed CT from 12/2/21 with patient - no septa, thickened areas, calcifications appreciated. No other dedicated cross sectional imaging of the abdomen to do a direct comparison to but on a CTA of the chest 11/2019 it did  part of the kidneys and the cyst actually was visible but incompletely imaged - measured at 6.6cm.   2. Currently would classify this as a large simple cyst. Will obtain another US to characterize it again. Pt would like the imaging done sooner rather than later due to insurance purposes.    Portal message with renal US results.        Renal cyst, acquired, right  -     Ambulatory referral/consult to Urology  -     US Retroperitoneal Complete; Future; Expected date: 02/17/2022

## 2022-02-22 ENCOUNTER — HOSPITAL ENCOUNTER (OUTPATIENT)
Dept: RADIOLOGY | Facility: HOSPITAL | Age: 62
Discharge: HOME | End: 2022-02-22
Attending: STUDENT IN AN ORGANIZED HEALTH CARE EDUCATION/TRAINING PROGRAM
Payer: COMMERCIAL

## 2022-02-22 ENCOUNTER — PATIENT MESSAGE (OUTPATIENT)
Dept: UROLOGY | Facility: CLINIC | Age: 62
End: 2022-02-22
Payer: MEDICAID

## 2022-02-22 DIAGNOSIS — N28.1 RENAL CYST, ACQUIRED, RIGHT: ICD-10-CM

## 2022-02-22 PROCEDURE — 76770 US EXAM ABDO BACK WALL COMP: CPT | Mod: TC,PO

## 2022-03-18 ENCOUNTER — TELEPHONE (OUTPATIENT)
Dept: ADMINISTRATIVE | Facility: OTHER | Age: 62
End: 2022-03-18
Payer: MEDICAID

## 2022-03-18 ENCOUNTER — OFFICE VISIT (OUTPATIENT)
Dept: FAMILY MEDICINE | Facility: CLINIC | Age: 62
End: 2022-03-18
Payer: COMMERCIAL

## 2022-03-18 VITALS
WEIGHT: 238.13 LBS | HEIGHT: 68 IN | BODY MASS INDEX: 36.09 KG/M2 | DIASTOLIC BLOOD PRESSURE: 72 MMHG | OXYGEN SATURATION: 97 % | SYSTOLIC BLOOD PRESSURE: 110 MMHG | HEART RATE: 83 BPM | TEMPERATURE: 99 F

## 2022-03-18 DIAGNOSIS — J30.2 SEASONAL ALLERGIES: Primary | ICD-10-CM

## 2022-03-18 DIAGNOSIS — G47.00 INSOMNIA, UNSPECIFIED TYPE: ICD-10-CM

## 2022-03-18 DIAGNOSIS — J02.9 PHARYNGITIS, UNSPECIFIED ETIOLOGY: ICD-10-CM

## 2022-03-18 DIAGNOSIS — R06.83 SNORES: ICD-10-CM

## 2022-03-18 PROCEDURE — 1159F PR MEDICATION LIST DOCUMENTED IN MEDICAL RECORD: ICD-10-PCS | Mod: CPTII,S$GLB,, | Performed by: FAMILY MEDICINE

## 2022-03-18 PROCEDURE — 3078F PR MOST RECENT DIASTOLIC BLOOD PRESSURE < 80 MM HG: ICD-10-PCS | Mod: CPTII,S$GLB,, | Performed by: FAMILY MEDICINE

## 2022-03-18 PROCEDURE — 3074F PR MOST RECENT SYSTOLIC BLOOD PRESSURE < 130 MM HG: ICD-10-PCS | Mod: CPTII,S$GLB,, | Performed by: FAMILY MEDICINE

## 2022-03-18 PROCEDURE — 3008F PR BODY MASS INDEX (BMI) DOCUMENTED: ICD-10-PCS | Mod: CPTII,S$GLB,, | Performed by: FAMILY MEDICINE

## 2022-03-18 PROCEDURE — 87081 CULTURE SCREEN ONLY: CPT | Performed by: FAMILY MEDICINE

## 2022-03-18 PROCEDURE — 99214 PR OFFICE/OUTPT VISIT, EST, LEVL IV, 30-39 MIN: ICD-10-PCS | Mod: 25,S$GLB,, | Performed by: FAMILY MEDICINE

## 2022-03-18 PROCEDURE — 3008F BODY MASS INDEX DOCD: CPT | Mod: CPTII,S$GLB,, | Performed by: FAMILY MEDICINE

## 2022-03-18 PROCEDURE — 3078F DIAST BP <80 MM HG: CPT | Mod: CPTII,S$GLB,, | Performed by: FAMILY MEDICINE

## 2022-03-18 PROCEDURE — 1159F MED LIST DOCD IN RCRD: CPT | Mod: CPTII,S$GLB,, | Performed by: FAMILY MEDICINE

## 2022-03-18 PROCEDURE — 3074F SYST BP LT 130 MM HG: CPT | Mod: CPTII,S$GLB,, | Performed by: FAMILY MEDICINE

## 2022-03-18 PROCEDURE — 96372 THER/PROPH/DIAG INJ SC/IM: CPT | Mod: S$GLB,,, | Performed by: FAMILY MEDICINE

## 2022-03-18 PROCEDURE — 96372 PR INJECTION,THERAP/PROPH/DIAG2ST, IM OR SUBCUT: ICD-10-PCS | Mod: S$GLB,,, | Performed by: FAMILY MEDICINE

## 2022-03-18 PROCEDURE — 99214 OFFICE O/P EST MOD 30 MIN: CPT | Mod: 25,S$GLB,, | Performed by: FAMILY MEDICINE

## 2022-03-18 RX ORDER — ALPRAZOLAM 0.25 MG/1
0.25 TABLET ORAL NIGHTLY PRN
Qty: 30 TABLET | Refills: 0 | Status: SHIPPED | OUTPATIENT
Start: 2022-03-18 | End: 2022-07-27 | Stop reason: SDUPTHER

## 2022-03-18 RX ORDER — TRIAMCINOLONE ACETONIDE 40 MG/ML
80 INJECTION, SUSPENSION INTRA-ARTICULAR; INTRAMUSCULAR ONCE
Status: COMPLETED | OUTPATIENT
Start: 2022-03-18 | End: 2022-03-18

## 2022-03-18 RX ADMIN — TRIAMCINOLONE ACETONIDE 80 MG: 40 INJECTION, SUSPENSION INTRA-ARTICULAR; INTRAMUSCULAR at 10:03

## 2022-03-18 NOTE — PROGRESS NOTES
"Subjective:      Patient ID: Kentrell Aguilera is a 61 y.o. female.    Chief Complaint: Cough (Non productive x 2 days), Sore Throat (X 2 days), Nasal Congestion (X 2 days), and Medication Refill (Xanax 0.5 mg (end 08/17/21))      Vitals:    03/18/22 0906   BP: 110/72   Pulse: 83   Temp: 98.6 °F (37 °C)   TempSrc: Oral   SpO2: 97%   Weight: 108 kg (238 lb 1.6 oz)   Height: 5' 7.5" (1.715 m)        HPI   Severe sore throat for 2 days, drip, nose runs; no fever  C/o weather change    Problem List  Patient Active Problem List   Diagnosis    Right foot pain    Ingrown toenail of right foot    Loss of balance    Ataxia, unspecified    Carpal tunnel syndrome    BMI 36.0-36.9,adult    Granuloma annulare    Vertigo    Tinea versicolor    Seborrheic keratoses    Renal cyst, acquired, right        ALLERGIES:   Review of patient's allergies indicates:   Allergen Reactions    Erythromycin     Pcn [penicillins]        MEDS:   Current Outpatient Medications:     betamethasone dipropionate (DIPROLENE) 0.05 % ointment, Apply topically 2 (two) times daily. for 10 days, Disp: 45 g, Rfl: 0    hydrocortisone 2.5 % cream, APPLY  CREAM TO RASH TWICE DAILY, Disp: 28 g, Rfl: 11    meclizine (ANTIVERT) 25 mg tablet, Take 1 tablet (25 mg total) by mouth 3 (three) times daily as needed., Disp: 100 tablet, Rfl: 11    ofloxacin (OCUFLOX) 0.3 % ophthalmic solution, , Disp: , Rfl:     PROLENSA 0.07 % Drop, SMARTSIG:In Eye(s), Disp: , Rfl:       History:  Current Providers as of 3/18/2022  PCP: Rayray Mann MD  Care Team Provider: Donnell Mann MD  Care Team Provider: Felton Meraz LPN  Care Team Provider: Herman Santos LPN  Care Team Provider: Traci Kamara LPN  Encounter Provider: Rayray Mann MD, starting on Fri Mar 18, 2022 12:00 AM  Referring Provider: not found, starting on Fri Mar 18, 2022 12:00 AM  Consulting Physician: Rayray Mann MD, starting on Fri Mar 18, 2022  9:05 AM (Active)   No past medical history " on file.  Past Surgical History:   Procedure Laterality Date    BREAST BIOPSY Left     negative  negative     Social History     Tobacco Use    Smoking status: Former Smoker     Packs/day: 0.50     Types: Cigarettes     Quit date:      Years since quittin.2    Smokeless tobacco: Never Used   Substance Use Topics    Alcohol use: Yes         Review of Systems   HENT: Positive for postnasal drip, rhinorrhea and sore throat.    All other systems reviewed and are negative.    Objective:     Physical Exam  Vitals and nursing note reviewed.   Constitutional:       Appearance: She is well-developed.   HENT:      Head: Normocephalic.      Mouth/Throat:      Pharynx: Posterior oropharyngeal erythema present.   Eyes:      Conjunctiva/sclera: Conjunctivae normal.      Pupils: Pupils are equal, round, and reactive to light.   Cardiovascular:      Rate and Rhythm: Normal rate and regular rhythm.      Heart sounds: Normal heart sounds.   Pulmonary:      Effort: Pulmonary effort is normal.      Breath sounds: Normal breath sounds.   Musculoskeletal:         General: Normal range of motion.      Cervical back: Normal range of motion and neck supple.   Skin:     General: Skin is warm and dry.   Neurological:      Mental Status: She is alert and oriented to person, place, and time.      Deep Tendon Reflexes: Reflexes are normal and symmetric.   Psychiatric:         Behavior: Behavior normal.         Thought Content: Thought content normal.         Judgment: Judgment normal.             Assessment:     No diagnosis found.  Plan:        Medication List          Accurate as of 2022  9:33 AM. If you have any questions, ask your nurse or doctor.            CONTINUE taking these medications    betamethasone dipropionate 0.05 % ointment  Commonly known as: DIPROLENE  Apply topically 2 (two) times daily. for 10 days     hydrocortisone 2.5 % cream  APPLY  CREAM TO RASH TWICE DAILY     meclizine 25 mg tablet  Commonly  known as: ANTIVERT  Take 1 tablet (25 mg total) by mouth 3 (three) times daily as needed.     ofloxacin 0.3 % ophthalmic solution  Commonly known as: OCUFLOX     PROLENSA 0.07 % Drop  Generic drug: bromfenac          There are no diagnoses linked to this encounter.

## 2022-03-18 NOTE — LETTER
March 18, 2022      [address] [address]  Phone: [phone]  Fax: [phone]       Patient: Kentrell Aguilera   YOB: 1960  Date of Visit: 03/18/2022    To Whom It May Concern:    John Aguilera  was at [facility] on 03/18/2022. The patient may return to work Monday March 21 with no restrictions. If you have any questions or concerns, or if I can be of further assistance, please do not hesitate to contact me.    Sincerely,    Rayray Mann MD

## 2022-03-21 LAB — BACTERIA THROAT CULT: NORMAL

## 2022-03-22 ENCOUNTER — TELEPHONE (OUTPATIENT)
Dept: FAMILY MEDICINE | Facility: CLINIC | Age: 62
End: 2022-03-22
Payer: MEDICAID

## 2022-04-09 ENCOUNTER — TELEPHONE (OUTPATIENT)
Dept: FAMILY MEDICINE | Facility: CLINIC | Age: 62
End: 2022-04-09
Payer: MEDICAID
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
## 2022-04-11 ENCOUNTER — HOSPITAL ENCOUNTER (OUTPATIENT)
Dept: RADIOLOGY | Facility: HOSPITAL | Age: 62
Discharge: HOME | End: 2022-04-11
Attending: FAMILY MEDICINE
Payer: COMMERCIAL
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
## 2022-04-11 PROCEDURE — 71100 X-RAY EXAM RIBS UNI 2 VIEWS: CPT | Mod: TC,FY,PO,LT

## 2022-05-05 ENCOUNTER — PATIENT MESSAGE (OUTPATIENT)
Dept: FAMILY MEDICINE | Facility: CLINIC | Age: 62
End: 2022-05-05
Payer: COMMERCIAL

## 2022-05-26 ENCOUNTER — PATIENT MESSAGE (OUTPATIENT)
Dept: FAMILY MEDICINE | Facility: CLINIC | Age: 62
End: 2022-05-26
Payer: COMMERCIAL

## 2022-05-31 ENCOUNTER — PATIENT MESSAGE (OUTPATIENT)
Dept: ADMINISTRATIVE | Facility: HOSPITAL | Age: 62
End: 2022-05-31
Payer: COMMERCIAL

## 2022-07-26 ENCOUNTER — TELEPHONE (OUTPATIENT)
Dept: FAMILY MEDICINE | Facility: CLINIC | Age: 62
End: 2022-07-26

## 2022-07-26 ENCOUNTER — CLINICAL SUPPORT (OUTPATIENT)
Dept: FAMILY MEDICINE | Facility: CLINIC | Age: 62
End: 2022-07-26
Payer: COMMERCIAL

## 2022-07-26 DIAGNOSIS — R69 SICK: Primary | ICD-10-CM

## 2022-07-26 DIAGNOSIS — R69 SICK: ICD-10-CM

## 2022-07-26 LAB
CTP QC/QA: YES
SARS-COV-2 RDRP RESP QL NAA+PROBE: NEGATIVE

## 2022-07-26 PROCEDURE — U0002: ICD-10-PCS | Mod: QW,S$GLB,, | Performed by: FAMILY MEDICINE

## 2022-07-26 PROCEDURE — U0002 COVID-19 LAB TEST NON-CDC: HCPCS | Mod: QW,S$GLB,, | Performed by: FAMILY MEDICINE

## 2022-07-27 ENCOUNTER — OFFICE VISIT (OUTPATIENT)
Dept: FAMILY MEDICINE | Facility: CLINIC | Age: 62
End: 2022-07-27
Payer: MEDICAID

## 2022-07-27 VITALS
OXYGEN SATURATION: 98 % | DIASTOLIC BLOOD PRESSURE: 70 MMHG | BODY MASS INDEX: 35.79 KG/M2 | WEIGHT: 228 LBS | TEMPERATURE: 98 F | SYSTOLIC BLOOD PRESSURE: 108 MMHG | HEIGHT: 67 IN | HEART RATE: 67 BPM

## 2022-07-27 DIAGNOSIS — R06.83 SNORES: ICD-10-CM

## 2022-07-27 DIAGNOSIS — J01.00 ACUTE NON-RECURRENT MAXILLARY SINUSITIS: Primary | ICD-10-CM

## 2022-07-27 DIAGNOSIS — R53.83 FATIGUE, UNSPECIFIED TYPE: ICD-10-CM

## 2022-07-27 DIAGNOSIS — Z00.00 WELLNESS EXAMINATION: ICD-10-CM

## 2022-07-27 PROCEDURE — 1159F MED LIST DOCD IN RCRD: CPT | Mod: CPTII,S$GLB,, | Performed by: FAMILY MEDICINE

## 2022-07-27 PROCEDURE — 1160F RVW MEDS BY RX/DR IN RCRD: CPT | Mod: CPTII,S$GLB,, | Performed by: FAMILY MEDICINE

## 2022-07-27 PROCEDURE — 1159F PR MEDICATION LIST DOCUMENTED IN MEDICAL RECORD: ICD-10-PCS | Mod: CPTII,S$GLB,, | Performed by: FAMILY MEDICINE

## 2022-07-27 PROCEDURE — 99214 PR OFFICE/OUTPT VISIT, EST, LEVL IV, 30-39 MIN: ICD-10-PCS | Mod: S$GLB,,, | Performed by: FAMILY MEDICINE

## 2022-07-27 PROCEDURE — 3078F PR MOST RECENT DIASTOLIC BLOOD PRESSURE < 80 MM HG: ICD-10-PCS | Mod: CPTII,S$GLB,, | Performed by: FAMILY MEDICINE

## 2022-07-27 PROCEDURE — 3008F BODY MASS INDEX DOCD: CPT | Mod: CPTII,S$GLB,, | Performed by: FAMILY MEDICINE

## 2022-07-27 PROCEDURE — 1160F PR REVIEW ALL MEDS BY PRESCRIBER/CLIN PHARMACIST DOCUMENTED: ICD-10-PCS | Mod: CPTII,S$GLB,, | Performed by: FAMILY MEDICINE

## 2022-07-27 PROCEDURE — 3074F PR MOST RECENT SYSTOLIC BLOOD PRESSURE < 130 MM HG: ICD-10-PCS | Mod: CPTII,S$GLB,, | Performed by: FAMILY MEDICINE

## 2022-07-27 PROCEDURE — 3074F SYST BP LT 130 MM HG: CPT | Mod: CPTII,S$GLB,, | Performed by: FAMILY MEDICINE

## 2022-07-27 PROCEDURE — 3078F DIAST BP <80 MM HG: CPT | Mod: CPTII,S$GLB,, | Performed by: FAMILY MEDICINE

## 2022-07-27 PROCEDURE — 3008F PR BODY MASS INDEX (BMI) DOCUMENTED: ICD-10-PCS | Mod: CPTII,S$GLB,, | Performed by: FAMILY MEDICINE

## 2022-07-27 PROCEDURE — 99214 OFFICE O/P EST MOD 30 MIN: CPT | Mod: S$GLB,,, | Performed by: FAMILY MEDICINE

## 2022-07-27 RX ORDER — ALPRAZOLAM 0.25 MG/1
0.25 TABLET ORAL NIGHTLY PRN
Qty: 30 TABLET | Refills: 2 | Status: SHIPPED | OUTPATIENT
Start: 2022-07-27 | End: 2023-04-28

## 2022-07-27 RX ORDER — DOXYCYCLINE HYCLATE 100 MG
100 TABLET ORAL 2 TIMES DAILY
Qty: 20 TABLET | Refills: 0 | Status: SHIPPED | OUTPATIENT
Start: 2022-07-27 | End: 2024-01-19

## 2022-07-27 NOTE — PROGRESS NOTES
"Subjective:      Patient ID: Kentrell Aguilera is a 62 y.o. female.    Chief Complaint: URI      Vitals:    07/27/22 0824   BP: 108/70   Pulse: 67   Temp: 97.9 °F (36.6 °C)   TempSrc: Oral   SpO2: 98%   Weight: 103.4 kg (228 lb)   Height: 5' 7" (1.702 m)        HPI   Sick since weekend, feels like s---;  Tested neg twice for covid, had vaccines  Headaches, bad sore throat, coughing, congestion, dripping; no fever; took mucinex  Not sob, not vomiting  No recent abx  snores    Problem List  Patient Active Problem List   Diagnosis    Right foot pain    Ingrown toenail of right foot    Loss of balance    Ataxia, unspecified    Carpal tunnel syndrome    BMI 36.0-36.9,adult    Granuloma annulare    Vertigo    Tinea versicolor    Seborrheic keratoses    Renal cyst, acquired, right    Snores    Insomnia        ALLERGIES:   Review of patient's allergies indicates:   Allergen Reactions    Erythromycin     Pcn [penicillins]        MEDS:   Current Outpatient Medications:     betamethasone dipropionate (DIPROLENE) 0.05 % ointment, Apply topically 2 (two) times daily. for 10 days, Disp: 45 g, Rfl: 0    hydrocortisone 2.5 % cream, APPLY  CREAM TO RASH TWICE DAILY, Disp: 28 g, Rfl: 11    ALPRAZolam (XANAX) 0.25 MG tablet, Take 1 tablet (0.25 mg total) by mouth nightly as needed for Anxiety., Disp: 30 tablet, Rfl: 2    doxycycline (VIBRA-TABS) 100 MG tablet, Take 1 tablet (100 mg total) by mouth 2 (two) times daily., Disp: 20 tablet, Rfl: 0    meclizine (ANTIVERT) 25 mg tablet, Take 1 tablet (25 mg total) by mouth 3 (three) times daily as needed. (Patient not taking: Reported on 7/27/2022), Disp: 100 tablet, Rfl: 11    ofloxacin (OCUFLOX) 0.3 % ophthalmic solution, , Disp: , Rfl:     PROLENSA 0.07 % Drop, SMARTSIG:In Eye(s), Disp: , Rfl:       History:  Current Providers as of 7/27/2022  PCP: Rayray Mann MD  Care Team Provider: Donnell Mann MD  Care Team Provider: Felton Meraz LPN  Care Team Provider: " Herman Santos LPN  Care Team Provider: Traci Kamara LPN  Encounter Provider: Rayray Mann MD, starting on  12:00 AM  Referring Provider: not found, starting on  12:00 AM  Consulting Physician: Rayray Mann MD, starting on   8:21 AM (Active)   History reviewed. No pertinent past medical history.  Past Surgical History:   Procedure Laterality Date    BREAST BIOPSY Left     negative  negative     Social History     Tobacco Use    Smoking status: Former Smoker     Packs/day: 0.50     Types: Cigarettes     Quit date:      Years since quittin.5    Smokeless tobacco: Never Used   Substance Use Topics    Alcohol use: Yes         Review of Systems   Constitutional: Negative.    HENT: Positive for sore throat.    Respiratory: Positive for cough.    Cardiovascular: Negative.    Gastrointestinal: Negative.    Endocrine: Negative.    Genitourinary: Negative.    Musculoskeletal: Negative.    Psychiatric/Behavioral: Negative.    All other systems reviewed and are negative.    Objective:     Physical Exam  Vitals and nursing note reviewed.   Constitutional:       Appearance: She is well-developed. She is obese.   HENT:      Head: Normocephalic.   Eyes:      Conjunctiva/sclera: Conjunctivae normal.      Pupils: Pupils are equal, round, and reactive to light.   Cardiovascular:      Rate and Rhythm: Normal rate and regular rhythm.      Heart sounds: Normal heart sounds.   Pulmonary:      Effort: Pulmonary effort is normal.      Breath sounds: Normal breath sounds.   Musculoskeletal:         General: Normal range of motion.      Cervical back: Normal range of motion and neck supple.   Skin:     General: Skin is warm and dry.   Neurological:      Mental Status: She is alert and oriented to person, place, and time.      Deep Tendon Reflexes: Reflexes are normal and symmetric.   Psychiatric:         Behavior: Behavior normal.         Thought Content: Thought content  normal.         Judgment: Judgment normal.             Assessment:     1. Acute non-recurrent maxillary sinusitis    2. Snores    3. Fatigue, unspecified type    4. Well woman exam    5. Wellness examination      Plan:        Medication List          Accurate as of July 27, 2022  8:56 AM. If you have any questions, ask your nurse or doctor.            START taking these medications    doxycycline 100 MG tablet  Commonly known as: VIBRA-TABS  Take 1 tablet (100 mg total) by mouth 2 (two) times daily.  Started by: Rayray Mann MD        CONTINUE taking these medications    ALPRAZolam 0.25 MG tablet  Commonly known as: XANAX  Take 1 tablet (0.25 mg total) by mouth nightly as needed for Anxiety.     betamethasone dipropionate 0.05 % ointment  Commonly known as: DIPROLENE  Apply topically 2 (two) times daily. for 10 days     hydrocortisone 2.5 % cream  APPLY  CREAM TO RASH TWICE DAILY     meclizine 25 mg tablet  Commonly known as: ANTIVERT  Take 1 tablet (25 mg total) by mouth 3 (three) times daily as needed.     ofloxacin 0.3 % ophthalmic solution  Commonly known as: OCUFLOX     PROLENSA 0.07 % Drop  Generic drug: bromfenac           Where to Get Your Medications      These medications were sent to [id number] - [facility] [address] [facility]  [address] [facility] [address]    Phone: [phone]   · ALPRAZolam 0.25 MG tablet  · doxycycline 100 MG tablet       Acute non-recurrent maxillary sinusitis    Snores  -     Home Sleep Studies; Future    Fatigue, unspecified type  -     Home Sleep Studies; Future    Well woman exam    Wellness examination  -     CBC Auto Differential; Future; Expected date: 07/27/2022  -     Comprehensive Metabolic Panel; Future; Expected date: 07/27/2022  -     Lipid Panel; Future  -     TSH; Future  -     Urinalysis; Future  -     Vitamin D; Future  -     Ambulatory referral/consult to Gastroenterology; Future; Expected date: 08/03/2022    Other orders  -     doxycycline  (VIBRA-TABS) 100 MG tablet; Take 1 tablet (100 mg total) by mouth 2 (two) times daily.  Dispense: 20 tablet; Refill: 0  -     ALPRAZolam (XANAX) 0.25 MG tablet; Take 1 tablet (0.25 mg total) by mouth nightly as needed for Anxiety.  Dispense: 30 tablet; Refill: 2

## 2022-07-27 NOTE — LETTER
July 27, 2022      [address] [address]  Phone: [phone]  Fax: [phone]       Patient: Kentrell Aguilera   YOB: 1960  Date of Visit: 07/27/2022    To Whom It May Concern:    Jhon Aguilera  was at [facility] on 07/27/2022. The patient may return to work July 28 with no restrictions. If you have any questions or concerns, or if I can be of further assistance, please do not hesitate to contact me.    Sincerely,    Rayray Mann MD

## 2022-08-01 ENCOUNTER — TELEPHONE (OUTPATIENT)
Dept: SLEEP MEDICINE | Facility: OTHER | Age: 62
End: 2022-08-01
Payer: COMMERCIAL

## 2022-08-05 ENCOUNTER — TELEPHONE (OUTPATIENT)
Dept: SLEEP MEDICINE | Facility: OTHER | Age: 62
End: 2022-08-05
Payer: COMMERCIAL

## 2022-08-09 ENCOUNTER — TELEPHONE (OUTPATIENT)
Dept: SLEEP MEDICINE | Facility: OTHER | Age: 62
End: 2022-08-09
Payer: COMMERCIAL

## 2022-08-15 ENCOUNTER — TELEPHONE (OUTPATIENT)
Dept: SLEEP MEDICINE | Facility: OTHER | Age: 62
End: 2022-08-15
Payer: COMMERCIAL

## 2022-08-15 ENCOUNTER — TELEPHONE (OUTPATIENT)
Dept: FAMILY MEDICINE | Facility: CLINIC | Age: 62
End: 2022-08-15
Payer: COMMERCIAL

## 2022-08-15 ENCOUNTER — LAB VISIT (OUTPATIENT)
Dept: LAB | Facility: HOSPITAL | Age: 62
End: 2022-08-15
Attending: FAMILY MEDICINE
Payer: COMMERCIAL

## 2022-08-15 DIAGNOSIS — Z00.00 WELLNESS EXAMINATION: ICD-10-CM

## 2022-08-15 LAB
ALBUMIN SERPL BCP-MCNC: 4.1 G/DL (ref 3.5–5.2)
ALP SERPL-CCNC: 102 U/L (ref 38–126)
ALT SERPL W/O P-5'-P-CCNC: 29 U/L (ref 10–44)
ANION GAP SERPL CALC-SCNC: 7 MMOL/L (ref 8–16)
AST SERPL-CCNC: 27 U/L (ref 15–46)
BASOPHILS # BLD AUTO: 0.06 K/UL (ref 0–0.2)
BASOPHILS NFR BLD: 0.9 % (ref 0–1.9)
BILIRUB SERPL-MCNC: 0.6 MG/DL (ref 0.1–1)
CALCIUM SERPL-MCNC: 8.8 MG/DL (ref 8.7–10.5)
CHLORIDE SERPL-SCNC: 105 MMOL/L (ref 95–110)
CHOLEST SERPL-MCNC: 228 MG/DL (ref 120–199)
CHOLEST/HDLC SERPL: 3.9 {RATIO} (ref 2–5)
CO2 SERPL-SCNC: 27 MMOL/L (ref 23–29)
CREAT SERPL-MCNC: 0.66 MG/DL (ref 0.5–1.4)
DIFFERENTIAL METHOD: NORMAL
EOSINOPHIL # BLD AUTO: 0.1 K/UL (ref 0–0.5)
EOSINOPHIL NFR BLD: 2 % (ref 0–8)
ERYTHROCYTE [DISTWIDTH] IN BLOOD BY AUTOMATED COUNT: 12.3 % (ref 11.5–14.5)
EST. GFR  (NO RACE VARIABLE): >60 ML/MIN/1.73 M^2
GLUCOSE SERPL-MCNC: 106 MG/DL (ref 70–110)
HCT VFR BLD AUTO: 45.2 % (ref 37–48.5)
HDLC SERPL-MCNC: 58 MG/DL (ref 40–75)
HDLC SERPL: 25.4 % (ref 20–50)
HGB BLD-MCNC: 15.2 G/DL (ref 12–16)
IMM GRANULOCYTES # BLD AUTO: 0.01 K/UL (ref 0–0.04)
IMM GRANULOCYTES NFR BLD AUTO: 0.2 % (ref 0–0.5)
LDLC SERPL CALC-MCNC: 140 MG/DL (ref 63–159)
LYMPHOCYTES # BLD AUTO: 1.3 K/UL (ref 1–4.8)
LYMPHOCYTES NFR BLD: 20.4 % (ref 18–48)
MCH RBC QN AUTO: 30 PG (ref 27–31)
MCHC RBC AUTO-ENTMCNC: 33.6 G/DL (ref 32–36)
MCV RBC AUTO: 89 FL (ref 82–98)
MONOCYTES # BLD AUTO: 0.6 K/UL (ref 0.3–1)
MONOCYTES NFR BLD: 8.5 % (ref 4–15)
NEUTROPHILS # BLD AUTO: 4.5 K/UL (ref 1.8–7.7)
NEUTROPHILS NFR BLD: 68 % (ref 38–73)
NONHDLC SERPL-MCNC: 170 MG/DL
NRBC BLD-RTO: 0 /100 WBC
PLATELET # BLD AUTO: 313 K/UL (ref 150–450)
PMV BLD AUTO: 10 FL (ref 9.2–12.9)
POTASSIUM SERPL-SCNC: 3.8 MMOL/L (ref 3.5–5.1)
PROT SERPL-MCNC: 7 G/DL (ref 6–8.4)
RBC # BLD AUTO: 5.06 M/UL (ref 4–5.4)
SODIUM SERPL-SCNC: 139 MMOL/L (ref 136–145)
TRIGL SERPL-MCNC: 150 MG/DL (ref 30–150)
TSH SERPL DL<=0.005 MIU/L-ACNC: 0.72 UIU/ML (ref 0.4–4)
UUN UR-MCNC: 15 MG/DL (ref 7–17)
WBC # BLD AUTO: 6.56 K/UL (ref 3.9–12.7)

## 2022-08-15 PROCEDURE — 85025 COMPLETE CBC W/AUTO DIFF WBC: CPT | Mod: PO | Performed by: FAMILY MEDICINE

## 2022-08-15 PROCEDURE — 84443 ASSAY THYROID STIM HORMONE: CPT | Mod: PO | Performed by: FAMILY MEDICINE

## 2022-08-15 PROCEDURE — 80061 LIPID PANEL: CPT | Performed by: FAMILY MEDICINE

## 2022-08-15 PROCEDURE — 80053 COMPREHEN METABOLIC PANEL: CPT | Mod: PO | Performed by: FAMILY MEDICINE

## 2022-08-15 PROCEDURE — 36415 COLL VENOUS BLD VENIPUNCTURE: CPT | Mod: PO | Performed by: FAMILY MEDICINE

## 2022-08-15 NOTE — TELEPHONE ENCOUNTER
Pt is going this AM, Monday, to get these labs.    Needs to see Mely soon for rectal bleeding, not a routine colonosocopy

## 2022-08-15 NOTE — TELEPHONE ENCOUNTER
Left messages to schedule the home sleep study,no response.  Sent out a message through my chart to schedule.

## 2022-09-20 ENCOUNTER — TELEPHONE (OUTPATIENT)
Dept: SLEEP MEDICINE | Facility: OTHER | Age: 62
End: 2022-09-20
Payer: COMMERCIAL

## 2022-09-20 NOTE — TELEPHONE ENCOUNTER
Left several messages and sent out a message through my chart to schedule the home sleep study,no response.

## 2022-10-09 ENCOUNTER — TELEPHONE (OUTPATIENT)
Dept: FAMILY MEDICINE | Facility: CLINIC | Age: 62
End: 2022-10-09
Payer: COMMERCIAL

## 2022-10-09 RX ORDER — GABAPENTIN 300 MG/1
300 CAPSULE ORAL NIGHTLY
Qty: 30 CAPSULE | Refills: 11 | Status: SHIPPED | OUTPATIENT
Start: 2022-10-09 | End: 2023-04-06

## 2022-10-17 ENCOUNTER — TELEPHONE (OUTPATIENT)
Dept: FAMILY MEDICINE | Facility: CLINIC | Age: 62
End: 2022-10-17
Payer: COMMERCIAL

## 2022-10-17 DIAGNOSIS — M54.9 DORSALGIA, UNSPECIFIED: Primary | ICD-10-CM

## 2022-10-20 ENCOUNTER — TELEPHONE (OUTPATIENT)
Dept: FAMILY MEDICINE | Facility: CLINIC | Age: 62
End: 2022-10-20
Payer: COMMERCIAL

## 2022-10-20 DIAGNOSIS — M54.32 BILATERAL SCIATICA: Primary | ICD-10-CM

## 2022-10-20 DIAGNOSIS — M25.562 CHRONIC PAIN OF BOTH KNEES: Primary | ICD-10-CM

## 2022-10-20 DIAGNOSIS — G89.29 CHRONIC PAIN OF BOTH KNEES: Primary | ICD-10-CM

## 2022-10-20 DIAGNOSIS — M54.31 BILATERAL SCIATICA: Primary | ICD-10-CM

## 2022-10-20 DIAGNOSIS — M25.561 CHRONIC PAIN OF BOTH KNEES: Primary | ICD-10-CM

## 2022-10-24 ENCOUNTER — HOSPITAL ENCOUNTER (OUTPATIENT)
Dept: RADIOLOGY | Facility: HOSPITAL | Age: 62
Discharge: HOME | End: 2022-10-24
Attending: FAMILY MEDICINE
Payer: COMMERCIAL

## 2022-10-24 ENCOUNTER — TELEPHONE (OUTPATIENT)
Dept: FAMILY MEDICINE | Facility: CLINIC | Age: 62
End: 2022-10-24
Payer: COMMERCIAL

## 2022-10-24 DIAGNOSIS — M54.31 BILATERAL SCIATICA: Primary | ICD-10-CM

## 2022-10-24 DIAGNOSIS — M54.32 BILATERAL SCIATICA: Primary | ICD-10-CM

## 2022-10-24 DIAGNOSIS — M54.9 DORSALGIA, UNSPECIFIED: ICD-10-CM

## 2022-10-24 PROCEDURE — 72148 MRI LUMBAR SPINE W/O DYE: CPT | Mod: TC,PO

## 2022-10-25 ENCOUNTER — OFFICE VISIT (OUTPATIENT)
Dept: PAIN MEDICINE | Facility: CLINIC | Age: 62
End: 2022-10-25
Payer: COMMERCIAL

## 2022-10-25 VITALS
BODY MASS INDEX: 37.4 KG/M2 | RESPIRATION RATE: 17 BRPM | HEART RATE: 73 BPM | WEIGHT: 238.31 LBS | HEIGHT: 67 IN | DIASTOLIC BLOOD PRESSURE: 82 MMHG | SYSTOLIC BLOOD PRESSURE: 167 MMHG

## 2022-10-25 DIAGNOSIS — M54.31 BILATERAL SCIATICA: ICD-10-CM

## 2022-10-25 DIAGNOSIS — M47.816 LUMBAR SPONDYLOSIS: ICD-10-CM

## 2022-10-25 DIAGNOSIS — M54.16 LUMBAR RADICULOPATHY: Primary | ICD-10-CM

## 2022-10-25 DIAGNOSIS — M51.36 DDD (DEGENERATIVE DISC DISEASE), LUMBAR: ICD-10-CM

## 2022-10-25 DIAGNOSIS — M54.32 BILATERAL SCIATICA: ICD-10-CM

## 2022-10-25 PROCEDURE — 3077F PR MOST RECENT SYSTOLIC BLOOD PRESSURE >= 140 MM HG: ICD-10-PCS | Mod: CPTII,S$GLB,, | Performed by: ANESTHESIOLOGY

## 2022-10-25 PROCEDURE — 3079F PR MOST RECENT DIASTOLIC BLOOD PRESSURE 80-89 MM HG: ICD-10-PCS | Mod: CPTII,S$GLB,, | Performed by: ANESTHESIOLOGY

## 2022-10-25 PROCEDURE — 20552 PR INJECT TRIGGER POINT, 1 OR 2: ICD-10-PCS | Mod: S$GLB,,, | Performed by: ANESTHESIOLOGY

## 2022-10-25 PROCEDURE — 99204 OFFICE O/P NEW MOD 45 MIN: CPT | Mod: 25,S$GLB,, | Performed by: ANESTHESIOLOGY

## 2022-10-25 PROCEDURE — 99999 PR PBB SHADOW E&M-EST. PATIENT-LVL IV: ICD-10-PCS | Mod: PBBFAC,,, | Performed by: ANESTHESIOLOGY

## 2022-10-25 PROCEDURE — 1159F PR MEDICATION LIST DOCUMENTED IN MEDICAL RECORD: ICD-10-PCS | Mod: CPTII,S$GLB,, | Performed by: ANESTHESIOLOGY

## 2022-10-25 PROCEDURE — 99204 PR OFFICE/OUTPT VISIT, NEW, LEVL IV, 45-59 MIN: ICD-10-PCS | Mod: 25,S$GLB,, | Performed by: ANESTHESIOLOGY

## 2022-10-25 PROCEDURE — 1159F MED LIST DOCD IN RCRD: CPT | Mod: CPTII,S$GLB,, | Performed by: ANESTHESIOLOGY

## 2022-10-25 PROCEDURE — 20552 NJX 1/MLT TRIGGER POINT 1/2: CPT | Mod: S$GLB,,, | Performed by: ANESTHESIOLOGY

## 2022-10-25 PROCEDURE — 3079F DIAST BP 80-89 MM HG: CPT | Mod: CPTII,S$GLB,, | Performed by: ANESTHESIOLOGY

## 2022-10-25 PROCEDURE — 1160F PR REVIEW ALL MEDS BY PRESCRIBER/CLIN PHARMACIST DOCUMENTED: ICD-10-PCS | Mod: CPTII,S$GLB,, | Performed by: ANESTHESIOLOGY

## 2022-10-25 PROCEDURE — 3077F SYST BP >= 140 MM HG: CPT | Mod: CPTII,S$GLB,, | Performed by: ANESTHESIOLOGY

## 2022-10-25 PROCEDURE — 99999 PR PBB SHADOW E&M-EST. PATIENT-LVL IV: CPT | Mod: PBBFAC,,, | Performed by: ANESTHESIOLOGY

## 2022-10-25 PROCEDURE — 1160F RVW MEDS BY RX/DR IN RCRD: CPT | Mod: CPTII,S$GLB,, | Performed by: ANESTHESIOLOGY

## 2022-10-25 NOTE — H&P (VIEW-ONLY)
New Patient Interventional Pain Note (Initial Visit)    Referring Physician: Rayray Mann MD    PCP: Rayray Mann MD    Chief Complaint:   Chief Complaint   Patient presents with    Low-back Pain     Patient has lower back pain.  Pain scale 10/10        SUBJECTIVE:    Kentrell Aguilera is a 62 y.o. female who presents to the clinic for the evaluation of lower back pain. The pain started 6 months ago and symptoms have been worsening.  Pain is described as a sharp stabbing pain that starts in a band across her lower back and radiates down her bilateral lower extremities to the ankles.  Pain is worse with flexion, extension, and standing, better with ice.  Pain is rated 10/10. Denies any fevers, chills, changes in gait, weakness, or bowel and bladder incontinence      Non-Pharmacologic Treatments:  Physical Therapy/Home Exercise: yes  Ice/Heat:yes  TENS: no  Acupuncture: no  Massage: no  Chiropractic: no        Previous Pain Medications:  NSAIDs, Tylenol, neuropathic, topicals     report:  Reviewed and consistent with medication use as prescribed.    Pain Procedures:   None      Imaging:     Results for orders placed during the hospital encounter of 10/24/22    MRI Lumbar Spine Without Contrast    Narrative  EXAMINATION:  MRI LUMBAR SPINE WITHOUT CONTRAST    CLINICAL HISTORY:  Low back pain, symptoms persist with > 6wks conservative treatment; Dorsalgia, unspecified    TECHNIQUE:  Multiplanar, multisequence MR images were acquired from the thoracolumbar junction to the sacrum without the administration of contrast.    COMPARISON:  X-ray dated 05/13/2021    FINDINGS:  There are 5 non-rib-bearing lumbar vertebrae.  There is mild grade 1 anterolisthesis of L4 on L5.  Alignment is otherwise unremarkable.  There is no acute fracture or compression deformity.  No aggressive focal signal abnormality.  No pars interarticularis defect.  Mild degenerative endplate changes noted including Modic type 1 edema at the anterior  inferior L2 endplate and anterior superior L5 endplate.    Conus medullaris terminates at the T12-L1 level.  Conus medullaris is normal in size and signal.    T12-L1: No significant disc pathology.  No significant spinal canal or neural foraminal stenosis.    L1-L2: No significant disc pathology.  No significant spinal canal or neural foraminal stenosis.    L2-L3: Mild disc desiccation and small circumferential disc bulge with superimposed small central protrusion.  Mild bilateral facet arthropathy.  No significant spinal canal or neural foraminal stenosis.    L3-L4: No significant disc pathology.  Mild bilateral facet arthropathy.  No significant spinal canal or neural foraminal stenosis.    L4-L5: Mild disc desiccation.  Bilateral facet arthropathy.  No significant spinal canal or neural foraminal stenosis.    L5-S1: Mild disc desiccation and asymmetric to the left small disc bulge.  Mild bilateral facet arthropathy.  No significant spinal canal stenosis.  Minimal left neural foraminal stenosis.    Paraspinal soft tissues are unremarkable.  Simple right renal cysts noted.  Visualized intra-abdominopelvic contents are otherwise unremarkable.    Impression  Multilevel degenerative changes as detailed above including grade 1 anterolisthesis of L4 on L5 as well as mild Modic type 1 edema at the L2 inferior endplate and L5 superior endplate.    Small disc bulge with superimposed small central protrusion at L2-L3.  No resulting spinal canal stenosis.    Minimal left neural foraminal stenosis at L5-S1.  No other significant spinal canal or neural foraminal stenosis.      Electronically signed by: Jaison Avelar  Date:    10/24/2022  Time:    08:06      Results for orders placed during the hospital encounter of 05/13/21    X-Ray Lumbar Spine 5 View    Narrative  EXAMINATION:  XR LUMBAR SPINE COMPLETE 5 VIEW    CLINICAL HISTORY:  Dorsalgia, unspecifiedBack pain, progressive neurologic  deficit;    COMPARISON:  None    FINDINGS:  Five views of the lumbar spine.    Moderate facet arthropathy L4/5 and L5/S1.  Minimal grade 1 anterolisthesis L4/5 with mild disc space narrowing.  Overall alignment is well maintained.  No evidence of spondylolysis. Vertebral body heights are normal.    Mild constipation    Impression  See above.      Electronically signed by: Jean Sahu MD  Date:    2021  Time:    08:10      History reviewed. No pertinent past medical history.  Past Surgical History:   Procedure Laterality Date    BREAST BIOPSY Left     negative  negative     Social History     Socioeconomic History    Marital status: Single   Occupational History     Comment: not [employer]   Tobacco Use    Smoking status: Former     Packs/day: 0.50     Types: Cigarettes     Quit date:      Years since quittin.8    Smokeless tobacco: Never   Substance and Sexual Activity    Alcohol use: Yes     Family History   Problem Relation Age of Onset    COPD Mother     Cancer Mother         lung    Coronary artery disease Father     Diabetes Father     Hypertension Sister     No Known Problems Brother     COPD Sister        Review of patient's allergies indicates:   Allergen Reactions    Erythromycin     Pcn [penicillins]        Current Outpatient Medications   Medication Sig    gabapentin (NEURONTIN) 300 MG capsule Take 1 capsule (300 mg total) by mouth every evening. For back and leg pain    hydrocortisone 2.5 % cream APPLY  CREAM TO RASH TWICE DAILY    meclizine (ANTIVERT) 25 mg tablet Take 1 tablet (25 mg total) by mouth 3 (three) times daily as needed.    ALPRAZolam (XANAX) 0.25 MG tablet Take 1 tablet (0.25 mg total) by mouth nightly as needed for Anxiety.    betamethasone dipropionate (DIPROLENE) 0.05 % ointment Apply topically 2 (two) times daily. for 10 days    doxycycline (VIBRA-TABS) 100 MG tablet Take 1 tablet (100 mg total) by mouth 2 (two) times daily. (Patient not taking: Reported on  "10/25/2022)    ofloxacin (OCUFLOX) 0.3 % ophthalmic solution     PROLENSA 0.07 % Drop SMARTSIG:In Eye(s)     No current facility-administered medications for this visit.         ROS  Review of Systems   Constitutional:  Negative for chills, diaphoresis, fatigue and fever.   HENT:  Negative for ear discharge, ear pain, rhinorrhea, trouble swallowing and voice change.    Eyes:  Negative for pain and redness.   Respiratory:  Negative for chest tightness, shortness of breath, wheezing and stridor.    Cardiovascular:  Negative for chest pain and leg swelling.   Gastrointestinal:  Negative for blood in stool, diarrhea, nausea and vomiting.   Endocrine: Negative for cold intolerance and heat intolerance.   Genitourinary:  Negative for dysuria, hematuria and urgency.   Musculoskeletal:  Positive for arthralgias, back pain, gait problem and myalgias. Negative for joint swelling, neck pain and neck stiffness.   Skin:  Negative for rash.   Neurological:  Positive for weakness and numbness. Negative for tremors, seizures, speech difficulty, light-headedness and headaches.   Hematological:  Does not bruise/bleed easily.   Psychiatric/Behavioral:  Negative for agitation, confusion and suicidal ideas. The patient is not nervous/anxious.           OBJECTIVE:  BP (!) 167/82   Pulse 73   Resp 17   Ht 5' 7" (1.702 m)   Wt 108.1 kg (238 lb 5.1 oz)   BMI 37.33 kg/m²         Physical Exam  Constitutional:       General: She is not in acute distress.     Appearance: Normal appearance. She is not ill-appearing.   HENT:      Head: Normocephalic and atraumatic.      Nose: No congestion or rhinorrhea.      Mouth/Throat:      Mouth: Mucous membranes are moist.   Eyes:      Extraocular Movements: Extraocular movements intact.      Pupils: Pupils are equal, round, and reactive to light.   Cardiovascular:      Pulses: Normal pulses.   Pulmonary:      Effort: Pulmonary effort is normal.   Abdominal:      General: Abdomen is flat.      " Palpations: Abdomen is soft.   Musculoskeletal:      Cervical back: Normal range of motion and neck supple.   Skin:     General: Skin is warm and dry.      Capillary Refill: Capillary refill takes less than 2 seconds.   Neurological:      Mental Status: She is alert and oriented to person, place, and time.      Cranial Nerves: No cranial nerve deficit.      Sensory: No sensory deficit.      Motor: Weakness present. No abnormal muscle tone.      Gait: Gait abnormal.      Deep Tendon Reflexes:      Reflex Scores:       Patellar reflexes are 1+ on the right side and 1+ on the left side.       Achilles reflexes are 1+ on the right side and 1+ on the left side.     Comments: Antalgic gait  4/5 strength in left dorsiflexion. Otherwise, 5/5 strength in muscle groups of bilateral lower extremities     Psychiatric:         Mood and Affect: Mood normal.         Behavior: Behavior normal.         Thought Content: Thought content normal.         Musculoskeletal:      Lumbar Exam  Incision: no  Pain with Flexion: yes  Pain with Extension: yes  Paraspinous TTP:  Bilateral lumbar paraspinous  Facet TTP:  L5-S1  Facet Loading:  Positive bilaterally  SLR:  Positive on the left at 70°  SIJ TTP:  Negative bilaterally  SAULO:  Negative bilaterally      LABS:  Lab Results   Component Value Date    WBC 6.56 08/15/2022    HGB 15.2 08/15/2022    HCT 45.2 08/15/2022    MCV 89 08/15/2022     08/15/2022       CMP  Sodium   Date Value Ref Range Status   08/15/2022 139 136 - 145 mmol/L Final     Potassium   Date Value Ref Range Status   08/15/2022 3.8 3.5 - 5.1 mmol/L Final     Chloride   Date Value Ref Range Status   08/15/2022 105 95 - 110 mmol/L Final     CO2   Date Value Ref Range Status   08/15/2022 27 23 - 29 mmol/L Final     Glucose   Date Value Ref Range Status   08/15/2022 106 70 - 110 mg/dL Final     BUN   Date Value Ref Range Status   08/15/2022 15 7 - 17 mg/dL Final     Creatinine   Date Value Ref Range Status   08/15/2022  0.66 0.50 - 1.40 mg/dL Final     Calcium   Date Value Ref Range Status   08/15/2022 8.8 8.7 - 10.5 mg/dL Final     Total Protein   Date Value Ref Range Status   08/15/2022 7.0 6.0 - 8.4 g/dL Final     Albumin   Date Value Ref Range Status   08/15/2022 4.1 3.5 - 5.2 g/dL Final     Total Bilirubin   Date Value Ref Range Status   08/15/2022 0.6 0.1 - 1.0 mg/dL Final     Comment:     For infants and newborns, interpretation of results should be based  on gestational age, weight and in agreement with clinical  observations.    Premature Infant recommended reference ranges:  Up to 24 hours.............<8.0 mg/dL  Up to 48 hours............<12.0 mg/dL  3-5 days..................<15.0 mg/dL  6-29 days.................<15.0 mg/dL       Alkaline Phosphatase   Date Value Ref Range Status   08/15/2022 102 38 - 126 U/L Final     AST   Date Value Ref Range Status   08/15/2022 27 15 - 46 U/L Final     ALT   Date Value Ref Range Status   08/15/2022 29 10 - 44 U/L Final     Anion Gap   Date Value Ref Range Status   08/15/2022 7 (L) 8 - 16 mmol/L Final     eGFR if    Date Value Ref Range Status   12/29/2020 >60.0 >60 mL/min/1.73 m^2 Final     eGFR if non    Date Value Ref Range Status   12/29/2020 >60.0 >60 mL/min/1.73 m^2 Final     Comment:     Calculation used to obtain the estimated glomerular filtration  rate (eGFR) is the CKD-EPI equation.          No results found for: LABA1C, HGBA1C          ASSESSMENT:       62 y.o. year old female with lower back pain, consistent with     1. Lumbar radiculopathy  IR Epidural Transfor 1st Vert Lumbar Shiva    Case Request-RAD/Other Procedure Area: Bilateral L5/S1 TF LAUREN      2. Bilateral sciatica  Ambulatory referral/consult to Pain Clinic      3. DDD (degenerative disc disease), lumbar        4. Lumbar spondylosis          Lumbar radiculopathy  -     IR Epidural Transfor 1st Vert Lumbar Shiva; Future; Expected date: 10/25/2022  -     Case Request-RAD/Other  Procedure Area: Bilateral L5/S1 TF LAUREN    Bilateral sciatica  -     Ambulatory referral/consult to Pain Clinic    DDD (degenerative disc disease), lumbar    Lumbar spondylosis             PLAN:   - Interventions:   Schedule patient for bilateral L5-S1 lumbar radiculopathy  Trigger point injection today in clinic  - Anticoagulation use:   no no anticoagulation    - Medications:   Continue gabapentin 300 mg at night    - Therapy:    Patient has completed 8 weeks of home physical therapy with mild relief   Refer for 2nd round of formal physical therapy for lower back pain    - Imaging/Diagnostic:   MRI lumbar spine reviewed and findings discussed with patient.  Significant for bilateral foraminal stenosis at L5-S1, left greater than right, secondary to disc herniation.    - Consults:   None at this time        - Patient Questions: Answered all of the patient's questions regarding diagnosis, therapy, and treatment    - Follow up visit: return to clinic 4 weeks after procedure    Procedure Note: Trigger point injection of left lumbar paraspinous x1  Pre-Procedure Diagnosis:  Myofascial pain  Post-Procedure Diagnosis: Same  : Salinas Jennings MD  Medication Mixture: 2ml 1% lidocaine and 1ml Depomedrol 40mg/ml    Description: After maximal tender points were identified at the noted areas above, the overlying skin was cleaned with etoh swabs.  Using a 25 G 1 inch hypodermic needle, the above medication mixture was distributed equally among all the injection sites  after negative aspiration. A stellate pattern was then used to needle the surrounding area. Needle was removed and areas cleaned and a bandage was applied    Blood loss minimal.  Complications: None  Disposition: Pt left in good condition with no complaints.      The above plan and management options were discussed at length with patient. Patient is in agreement with the above and verbalized understanding.    I discussed the goals of interventional  chronic pain management with the patient on today's visit.  I explained the utility of injections for diagnostic and therapeutic purposes.  We discussed a multimodal approach to pain including treating the patient's given worst pain at any given time.  We will use a systematic approach to addressing pain.  We will also adopt a multimodal approach that includes injections, adjuvant medications, physical therapy, at times psychiatry.  There may be a limited role for opioid use intermittently in the treatment of pain, more particularly for acute pain although no one approach can be used as a sole treatment modality.    I emphasized the importance of regular exercise, core strengthening and stretching, diet and weight loss as a cornerstone of long-term pain management.      Salinas Jennings MD  Interventional Pain Management  [facility]    Disclaimer:  This note was prepared using voice recognition system and is likely to have sound alike errors that may have been overlooked even after proof reading.  Please call me with any questions

## 2022-11-01 NOTE — PRE-PROCEDURE INSTRUCTIONS
Spoke with patient regarding procedure scheduled on 11.9    Arrival time 0700    Has patient been sick with fever or on antibiotics within the last 7 days? No    Does the patient have any open wounds, sores or rashes? No    Does the patient have any recent fractures? no    Has patient received a vaccination within the last 7 days? No    Received the COVID vaccination? yes    Has the patient stopped all medications as directed? NA    Does patient have a pacemaker and or defibrillator? no    Does the patient have a ride to and from procedure and someone reliable to remain with patient? Friend dakota     Is the patient diabetic? no    Does the patient have sleep apnea? Or use O2 at home? No and no     Is the patient receiving sedation? yes    Is the patient instructed to remain NPO beginning at midnight the night before their procedure? yes    Procedure location confirmed with patient? Yes    Covid- Denies signs/symptoms. Instructed to notify PAT/MD if any changes.

## 2022-11-09 ENCOUNTER — HOSPITAL ENCOUNTER (OUTPATIENT)
Facility: HOSPITAL | Age: 62
Discharge: HOME | End: 2022-11-09
Attending: ANESTHESIOLOGY | Admitting: ANESTHESIOLOGY
Payer: COMMERCIAL

## 2022-11-09 VITALS
TEMPERATURE: 97 F | RESPIRATION RATE: 18 BRPM | SYSTOLIC BLOOD PRESSURE: 127 MMHG | DIASTOLIC BLOOD PRESSURE: 76 MMHG | HEIGHT: 67 IN | BODY MASS INDEX: 36.64 KG/M2 | WEIGHT: 233.44 LBS | HEART RATE: 70 BPM | OXYGEN SATURATION: 96 %

## 2022-11-09 DIAGNOSIS — M54.16 LUMBAR RADICULOPATHY: Primary | ICD-10-CM

## 2022-11-09 PROCEDURE — 64483 NJX AA&/STRD TFRM EPI L/S 1: CPT | Mod: 50,,, | Performed by: ANESTHESIOLOGY

## 2022-11-09 PROCEDURE — 64483 PR EPIDURAL INJ, ANES/STEROID, TRANSFORAMINAL, LUMB/SACR, SNGL LEVL: ICD-10-PCS | Mod: 50,,, | Performed by: ANESTHESIOLOGY

## 2022-11-09 PROCEDURE — 25500020 PHARM REV CODE 255: Performed by: ANESTHESIOLOGY

## 2022-11-09 PROCEDURE — 25000003 PHARM REV CODE 250: Performed by: ANESTHESIOLOGY

## 2022-11-09 PROCEDURE — 63600175 PHARM REV CODE 636 W HCPCS: Performed by: ANESTHESIOLOGY

## 2022-11-09 PROCEDURE — 64483 NJX AA&/STRD TFRM EPI L/S 1: CPT | Mod: 50 | Performed by: ANESTHESIOLOGY

## 2022-11-09 RX ORDER — MIDAZOLAM HYDROCHLORIDE 1 MG/ML
INJECTION, SOLUTION INTRAMUSCULAR; INTRAVENOUS
Status: DISCONTINUED | OUTPATIENT
Start: 2022-11-09 | End: 2022-11-09 | Stop reason: HOSPADM

## 2022-11-09 RX ORDER — BETAMETHASONE SODIUM PHOSPHATE AND BETAMETHASONE ACETATE 3; 3 MG/ML; MG/ML
INJECTION, SUSPENSION INTRA-ARTICULAR; INTRALESIONAL; INTRAMUSCULAR; SOFT TISSUE
Status: DISCONTINUED | OUTPATIENT
Start: 2022-11-09 | End: 2022-11-09 | Stop reason: HOSPADM

## 2022-11-09 RX ORDER — LIDOCAINE HYDROCHLORIDE 10 MG/ML
INJECTION, SOLUTION EPIDURAL; INFILTRATION; INTRACAUDAL; PERINEURAL
Status: DISCONTINUED | OUTPATIENT
Start: 2022-11-09 | End: 2022-11-09 | Stop reason: HOSPADM

## 2022-11-09 RX ORDER — ONDANSETRON 2 MG/ML
4 INJECTION INTRAMUSCULAR; INTRAVENOUS ONCE AS NEEDED
Status: DISCONTINUED | OUTPATIENT
Start: 2022-11-09 | End: 2022-11-09 | Stop reason: HOSPADM

## 2022-11-09 RX ORDER — FENTANYL CITRATE 50 UG/ML
INJECTION, SOLUTION INTRAMUSCULAR; INTRAVENOUS
Status: DISCONTINUED | OUTPATIENT
Start: 2022-11-09 | End: 2022-11-09 | Stop reason: HOSPADM

## 2022-11-09 NOTE — PLAN OF CARE
Patient recovered to baseline. Instructions given. All questions answered. All bandaids remain clean,dry, intact. Discharged to designated  at this time.

## 2022-11-09 NOTE — DISCHARGE INSTRUCTIONS

## 2022-11-09 NOTE — OP NOTE
Transforaminal Lumbar Epidural Steroid Injection    INFORMED CONSENT: The procedure, risks, benefits and options were discussed with patient. There are no contraindications to the procedure. The patient expressed understanding and agreed to proceed. The personnel performing the procedure was discussed.    Date of procedure 11/09/2022    Time-out taken to identify patient and procedure side prior to starting the procedure.                     PROCEDURE:    1)  Bilateral  L5/S1 TRANSFORAMINAL EPIDURAL STEROID INJECTION      Pre Procedure diagnosis:    Lumbar radiculopathy [M54.16]  1. Lumbar radiculopathy        Post-Procedure diagnosis:   same    Surgeon: Salinas Jennings MD    Assistants: None    Medication: 2ml Betamethasone PF 6mg/ml and 2ml Lidocaine PF 1%    Local: 3ml Lidocaine PF 1%    Sedation: Conscious sedation provided by M.D    SEDATION MEDICATIONS: local/IV sedation: Versed 2 mg and fentanyl 100 mcg IV.  Conscious sedation ordered by MD.  Patient reevaluated and sedation administered by MD and monitored by RN.  Total sedation time was less than 10 min.    Total sedation time was <10 min    Complications: None    Specimens: None        TECHNIQUE: The patient was brought to the procedure room. IV access was obtained prior to the procedure. The patient was positioned prone on the fluoroscopy table. Continuous hemodynamic monitoring was initiated including blood pressure, EKG, and pulse oximetry. . The skin was prepped with chlorhexidine and draped in a sterile fashion.   Local Xylocaine was injected by raising a wheel and going down to the periosteum using a 27-gauge hypodermic needle.      The bilateral L5/S1 transforaminal spaces were identified with fluoroscopy in the  AP, oblique, and lateral views.  A 22 gauge spinal quinke needle was then advanced into the area of the trans foraminal spaces at the above levels with confirmation of proper needle position using AP, oblique, and lateral fluoroscopic  views. Once the needle tip was in the area of the transforaminal space, and there was no blood, CSF or paraesthesias,  2 mL of Omnipaque 300mg/ml was injected at each level for a total of 4 mL.  Fluoroscopic imaging in the AP and lateral views revealed a clear outline of the spinal nerve with proximal spread of agent through the neural foramen into the epidural space. A total combination of 1 mL of Lidocaine PF 1% and 1ml of Betamethasone PF 6mg/ml was injected into each level for a total of 4mL of injected medications with displacement of the contrast dye confirming that the medication went into the area of the transforaminal spaces at each level. A sterile dressing was applied. Patient tolerated procedure well.        The patient was monitored for approximately 30 minutes after the procedure.  Patient was given post procedure and discharge instructions to follow at home.  The patient was discharged in a stable condition

## 2022-11-09 NOTE — DISCHARGE SUMMARY
Discharge Note  Short Stay      SUMMARY     Admit Date: 11/9/2022    Attending Physician: Salinas Jennings MD        Discharge Physician: Salinas Jennings MD        Discharge Date: 11/9/2022 8:24 AM    Procedure(s) (LRB):  Bilateral L5/S1 TF LAUREN (Bilateral)    Final Diagnosis: Lumbar radiculopathy [M54.16]    Disposition: Home or self care    Patient Instructions:   Current Discharge Medication List        CONTINUE these medications which have NOT CHANGED    Details   ALPRAZolam (XANAX) 0.25 MG tablet Take 1 tablet (0.25 mg total) by mouth nightly as needed for Anxiety.  Qty: 30 tablet, Refills: 2      betamethasone dipropionate (DIPROLENE) 0.05 % ointment Apply topically 2 (two) times daily. for 10 days  Qty: 45 g, Refills: 0      doxycycline (VIBRA-TABS) 100 MG tablet Take 1 tablet (100 mg total) by mouth 2 (two) times daily.  Qty: 20 tablet, Refills: 0      gabapentin (NEURONTIN) 300 MG capsule Take 1 capsule (300 mg total) by mouth every evening. For back and leg pain  Qty: 30 capsule, Refills: 11      hydrocortisone 2.5 % cream APPLY  CREAM TO RASH TWICE DAILY  Qty: 28 g, Refills: 11    Comments: Please consider 90 day supplies to promote better adherence      meclizine (ANTIVERT) 25 mg tablet Take 1 tablet (25 mg total) by mouth 3 (three) times daily as needed.  Qty: 100 tablet, Refills: 11      ofloxacin (OCUFLOX) 0.3 % ophthalmic solution       PROLENSA 0.07 % Drop SMARTSIG:In Eye(s)                 Discharge Diagnosis: Lumbar radiculopathy [M54.16]  Condition on Discharge: Stable with no complications to procedure   Diet on Discharge: Same as before.  Activity: as per instruction sheet.  Discharge to: Home with a responsible adult.  Follow up: 2-4 weeks       Please call the office at [phone] if you experience any weakness or loss of sensation, fever > 101.5, pain uncontrolled with oral medications, persistent nausea/vomiting/or diarrhea, redness or drainage from the incisions, or any other  worrisome concerns. If physician on call was not reached or could not communicate with our office for any reason please go to the nearest emergency department

## 2022-11-11 ENCOUNTER — HOSPITAL ENCOUNTER (OUTPATIENT)
Dept: RADIOLOGY | Facility: HOSPITAL | Age: 62
Discharge: HOME | End: 2022-11-11
Attending: STUDENT IN AN ORGANIZED HEALTH CARE EDUCATION/TRAINING PROGRAM
Payer: COMMERCIAL

## 2022-11-11 ENCOUNTER — TELEPHONE (OUTPATIENT)
Dept: SPORTS MEDICINE | Facility: CLINIC | Age: 62
End: 2022-11-11
Payer: COMMERCIAL

## 2022-11-11 DIAGNOSIS — M25.561 PAIN IN BOTH KNEES, UNSPECIFIED CHRONICITY: Primary | ICD-10-CM

## 2022-11-11 DIAGNOSIS — M25.561 PAIN IN BOTH KNEES, UNSPECIFIED CHRONICITY: ICD-10-CM

## 2022-11-11 DIAGNOSIS — M25.562 PAIN IN BOTH KNEES, UNSPECIFIED CHRONICITY: Primary | ICD-10-CM

## 2022-11-11 DIAGNOSIS — M25.562 PAIN IN BOTH KNEES, UNSPECIFIED CHRONICITY: ICD-10-CM

## 2022-11-11 PROCEDURE — 73564 X-RAY EXAM KNEE 4 OR MORE: CPT | Mod: TC,50,FY,PO

## 2022-11-14 ENCOUNTER — TELEPHONE (OUTPATIENT)
Dept: SPORTS MEDICINE | Facility: CLINIC | Age: 62
End: 2022-11-14

## 2022-11-14 ENCOUNTER — OFFICE VISIT (OUTPATIENT)
Dept: SPORTS MEDICINE | Facility: CLINIC | Age: 62
End: 2022-11-14
Payer: COMMERCIAL

## 2022-11-14 VITALS
DIASTOLIC BLOOD PRESSURE: 94 MMHG | BODY MASS INDEX: 36.59 KG/M2 | SYSTOLIC BLOOD PRESSURE: 159 MMHG | HEART RATE: 73 BPM | WEIGHT: 233.13 LBS | HEIGHT: 67 IN

## 2022-11-14 DIAGNOSIS — M25.562 CHRONIC PAIN OF BOTH KNEES: ICD-10-CM

## 2022-11-14 DIAGNOSIS — G89.29 CHRONIC PAIN OF BOTH KNEES: ICD-10-CM

## 2022-11-14 DIAGNOSIS — M17.0 BILATERAL PRIMARY OSTEOARTHRITIS OF KNEE: Primary | ICD-10-CM

## 2022-11-14 DIAGNOSIS — M25.561 CHRONIC PAIN OF BOTH KNEES: ICD-10-CM

## 2022-11-14 PROCEDURE — 99999 PR PBB SHADOW E&M-EST. PATIENT-LVL IV: CPT | Mod: PBBFAC,,, | Performed by: STUDENT IN AN ORGANIZED HEALTH CARE EDUCATION/TRAINING PROGRAM

## 2022-11-14 PROCEDURE — 3080F DIAST BP >= 90 MM HG: CPT | Mod: CPTII,S$GLB,, | Performed by: STUDENT IN AN ORGANIZED HEALTH CARE EDUCATION/TRAINING PROGRAM

## 2022-11-14 PROCEDURE — 1160F PR REVIEW ALL MEDS BY PRESCRIBER/CLIN PHARMACIST DOCUMENTED: ICD-10-PCS | Mod: CPTII,S$GLB,, | Performed by: STUDENT IN AN ORGANIZED HEALTH CARE EDUCATION/TRAINING PROGRAM

## 2022-11-14 PROCEDURE — 99204 PR OFFICE/OUTPT VISIT, NEW, LEVL IV, 45-59 MIN: ICD-10-PCS | Mod: S$GLB,,, | Performed by: STUDENT IN AN ORGANIZED HEALTH CARE EDUCATION/TRAINING PROGRAM

## 2022-11-14 PROCEDURE — 3008F BODY MASS INDEX DOCD: CPT | Mod: CPTII,S$GLB,, | Performed by: STUDENT IN AN ORGANIZED HEALTH CARE EDUCATION/TRAINING PROGRAM

## 2022-11-14 PROCEDURE — 3008F PR BODY MASS INDEX (BMI) DOCUMENTED: ICD-10-PCS | Mod: CPTII,S$GLB,, | Performed by: STUDENT IN AN ORGANIZED HEALTH CARE EDUCATION/TRAINING PROGRAM

## 2022-11-14 PROCEDURE — 1160F RVW MEDS BY RX/DR IN RCRD: CPT | Mod: CPTII,S$GLB,, | Performed by: STUDENT IN AN ORGANIZED HEALTH CARE EDUCATION/TRAINING PROGRAM

## 2022-11-14 PROCEDURE — 99999 PR PBB SHADOW E&M-EST. PATIENT-LVL IV: ICD-10-PCS | Mod: PBBFAC,,, | Performed by: STUDENT IN AN ORGANIZED HEALTH CARE EDUCATION/TRAINING PROGRAM

## 2022-11-14 PROCEDURE — 3080F PR MOST RECENT DIASTOLIC BLOOD PRESSURE >= 90 MM HG: ICD-10-PCS | Mod: CPTII,S$GLB,, | Performed by: STUDENT IN AN ORGANIZED HEALTH CARE EDUCATION/TRAINING PROGRAM

## 2022-11-14 PROCEDURE — 99204 OFFICE O/P NEW MOD 45 MIN: CPT | Mod: S$GLB,,, | Performed by: STUDENT IN AN ORGANIZED HEALTH CARE EDUCATION/TRAINING PROGRAM

## 2022-11-14 PROCEDURE — 3077F SYST BP >= 140 MM HG: CPT | Mod: CPTII,S$GLB,, | Performed by: STUDENT IN AN ORGANIZED HEALTH CARE EDUCATION/TRAINING PROGRAM

## 2022-11-14 PROCEDURE — 1159F MED LIST DOCD IN RCRD: CPT | Mod: CPTII,S$GLB,, | Performed by: STUDENT IN AN ORGANIZED HEALTH CARE EDUCATION/TRAINING PROGRAM

## 2022-11-14 PROCEDURE — 1159F PR MEDICATION LIST DOCUMENTED IN MEDICAL RECORD: ICD-10-PCS | Mod: CPTII,S$GLB,, | Performed by: STUDENT IN AN ORGANIZED HEALTH CARE EDUCATION/TRAINING PROGRAM

## 2022-11-14 PROCEDURE — 3077F PR MOST RECENT SYSTOLIC BLOOD PRESSURE >= 140 MM HG: ICD-10-PCS | Mod: CPTII,S$GLB,, | Performed by: STUDENT IN AN ORGANIZED HEALTH CARE EDUCATION/TRAINING PROGRAM

## 2022-11-14 NOTE — TELEPHONE ENCOUNTER
----- Message from Lizzy Hall sent at 11/14/2022  2:21 PM CST -----  Contact: pt  Pt returning call to office       Confirmed patient's contact info below:  Contact Name: Kentrell Aguilera  Phone Number: [phone]

## 2022-11-14 NOTE — PROGRESS NOTES
Subjective:          Chief Complaint: Kentrell Aguilera is a 62 y.o. female who had concerns including Pain of the Left Knee and Pain of the Right Knee.    Kentrell Aguilera is a 62 y.o. 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  She has a history of 2 knee arthroscopy on the right, 1 was about 19 years old for bucket-handle meniscus tear in the 2nd about 8 years ago.  Her knees gradually become more painful and stiff.  The pain is located mainly parapatellar and occasionally along the medial aspect.  She is an avid hiker, outdoorsman, and walker.  She says she can not kneel on her knees due to the pain.  She is pain going from sit to stand as well as ascending and descending stairs.  She says her knees are stiff when she gets up in the morning after extended periods of immobilization.  She does have weakness with buckling.  Denies any true instability or locking.    History reviewed. No pertinent past medical history.    Current Outpatient Medications on File Prior to Visit   Medication Sig Dispense Refill    gabapentin (NEURONTIN) 300 MG capsule Take 1 capsule (300 mg total) by mouth every evening. For back and leg pain 30 capsule 11    hydrocortisone 2.5 % cream APPLY  CREAM TO RASH TWICE DAILY 28 g 11    meclizine (ANTIVERT) 25 mg tablet Take 1 tablet (25 mg total) by mouth 3 (three) times daily as needed. 100 tablet 11    ofloxacin (OCUFLOX) 0.3 % ophthalmic solution       PROLENSA 0.07 % Drop SMARTSIG:In Eye(s)      ALPRAZolam (XANAX) 0.25 MG tablet Take 1 tablet (0.25 mg total) by mouth nightly as needed for Anxiety. 30 tablet 2    betamethasone dipropionate (DIPROLENE) 0.05 % ointment Apply topically 2 (two) times daily. for 10 days 45 g 0    doxycycline (VIBRA-TABS) 100 MG tablet Take 1 tablet (100 mg total) by mouth 2 (two) times daily. (Patient not taking: Reported on 10/25/2022) 20 tablet 0     No current facility-administered medications on file prior to  visit.       Past Surgical History:   Procedure Laterality Date    BREAST BIOPSY Left     negative  negative    SELECTIVE INJECTION OF ANESTHETIC AGENT AROUND LUMBAR SPINAL NERVE ROOT BY TRANSFORAMINAL APPROACH Bilateral 2022    Procedure: Bilateral L5/S1 TF LAUREN;  Surgeon: Salinas Jennings MD;  [facility] [facility]  Service: Pain Management;  Laterality: Bilateral;       Family History   Problem Relation Age of Onset    COPD Mother     Cancer Mother         lung    Coronary artery disease Father     Diabetes Father     Hypertension Sister     No Known Problems Brother     COPD Sister        Social History     Socioeconomic History    Marital status: Single   Occupational History     Comment: not [employer]   Tobacco Use    Smoking status: Former     Packs/day: 0.50     Types: Cigarettes     Quit date:      Years since quittin.8    Smokeless tobacco: Never   Substance and Sexual Activity    Alcohol use: Yes       Review of Systems   Constitutional: Negative.   HENT: Negative.     Eyes: Negative.    Cardiovascular: Negative.    Respiratory: Negative.     Endocrine: Negative.    Hematologic/Lymphatic: Negative.    Skin: Negative.    Musculoskeletal:  Positive for arthritis, joint pain, joint swelling, muscle weakness and stiffness. Negative for falls, muscle cramps and myalgias.   Neurological: Negative.    Psychiatric/Behavioral: Negative.     Allergic/Immunologic: Negative.                  Objective:        General: Kentrell is well-developed, well-nourished, appears stated age, in no acute distress, alert and oriented to time, place and person.     General    Nursing note and vitals reviewed.  Constitutional: She is oriented to person, place, and time. She appears well-developed and well-nourished. No distress.   HENT:   Head: Normocephalic and atraumatic.   Eyes: EOM are normal.   Cardiovascular:  Normal rate and intact distal pulses.            Pulmonary/Chest: Effort normal. No  respiratory distress.   Neurological: She is alert and oriented to person, place, and time.   Psychiatric: She has a normal mood and affect. Her behavior is normal. Judgment and thought content normal.     General Musculoskeletal Exam   Gait: normal       Right Knee Exam     Inspection   Erythema: absent  Swelling: absent  Effusion: absent  Deformity: present (Varus)  Bruising: absent    Tenderness   The patient is tender to palpation of the patella and medial joint line (medial patellar facet).    Crepitus   The patient has crepitus of the patella.    Range of Motion   Extension:  5   Flexion:  140     Tests   Meniscus   Siddharth:  Medial - negative Lateral - negative  Ligament Examination   Lachman: normal (-1 to 2mm)   PCL-Posterior Drawer: normal (0 to 2mm)     MCL - Valgus: normal (0 to 2mm)  LCL - Varus: normal  Patella   Patellar apprehension: negative  Passive Patellar Tilt: neutral  Patellar Tracking: normal  Patellar Grind: positive    Other   Muscle Tightness: hamstring tightness  Sensation: normal    Left Knee Exam     Inspection   Erythema: absent  Swelling: absent  Effusion: absent  Deformity: present (varus)  Bruising: absent    Tenderness   The patient tender to palpation of the patella and medial joint line (medial patellar facet).    Crepitus   The patient has crepitus of the patella.    Range of Motion   Extension:  0   Flexion:  140     Tests   Meniscus   Siddharth:  Medial - negative Lateral - negative  Stability   Lachman: normal (-1 to 2mm)   PCL-Posterior Drawer: normal (0 to 2mm)  MCL - Valgus: normal (0 to 2mm)  LCL - Varus: normal (0 to 2mm)  Patella   Patellar apprehension: negative  Passive Patellar Tilt: neutral  Patellar Tracking: normal  Patellar Grind: positive    Other   Muscle Tightness: hamstring tightness  Sensation: normal    Right Hip Exam     Tests   Frieda: negative  Left Hip Exam     Tests   Frieda: negative          Muscle Strength   Right Lower Extremity   Quadriceps:  5/5    Hamstrin/5   Left Lower Extremity   Quadriceps:  5/5   Hamstrin/5     Vascular Exam     Right Pulses  Dorsalis Pedis:      2+  Posterior Tibial:      2+        Left Pulses  Dorsalis Pedis:      2+  Posterior Tibial:      2+        Edema  Right Lower Leg: absent  Left Lower Leg: absent      Imaging:  X-rays of the bilateral knees from 2022 personally reviewed by me 2022.  These include weight-bearing AP, PA flexion, lateral, and Merchant views.  There is moderate arthritic changes the medial compartment of bilateral knees worse on the right than the left.  The patellofemoral compartments have severe arthritic changes in the bilateral knees.  Kellgren Robb grade 3-4.      Assessment:     Kentrell Aguilera is a 62 y.o. female with bilateral knee osteoarthritis  Encounter Diagnoses   Name Primary?    Chronic pain of both knees     Bilateral primary osteoarthritis of knee Yes          Plan:       The diagnosis treatment options with the patient all her questions were answered.  I showed her the x-rays explained the findings to her.  We discussed treatment options.  She is not had any treatment for this today I recommended we begin conservatively.  We will refer to physical therapy to work on range of motion and strengthening.  Additionally, we will get her set up for viscosupplementation.  Return to clinic for the injections.    All of their questions were answered.  They will call the clinic with any questions or concerns in the interim.    Should the patient's symptoms worsen, persist, or fail to improve they should return for reevaluation and I would be happy to see them back anytime.        Rajendra Lincoln M.D.    Please be aware that this note has been generated with the assistance of jing voice-to-text.  Please excuse any spelling or grammatical errors.    Thank you for choosing Dr. Rajendra Lincoln for your sports medicine care. It is our goal to provide you with exceptional care that will  help keep you healthy, active, and get you back in the game.     If you felt that you received exemplary care today, please consider leaving feedback for Dr. Lincoln on [email or web address] at [email or web address].    Please do not hesitate to reach out to us via email, phone, or MyChart with any questions, concerns, or feedback.

## 2022-12-06 ENCOUNTER — OFFICE VISIT (OUTPATIENT)
Dept: PAIN MEDICINE | Facility: CLINIC | Age: 62
End: 2022-12-06
Payer: COMMERCIAL

## 2022-12-06 VITALS
DIASTOLIC BLOOD PRESSURE: 96 MMHG | RESPIRATION RATE: 17 BRPM | BODY MASS INDEX: 36.02 KG/M2 | HEIGHT: 67 IN | WEIGHT: 229.5 LBS | SYSTOLIC BLOOD PRESSURE: 150 MMHG | HEART RATE: 83 BPM

## 2022-12-06 DIAGNOSIS — M54.16 LUMBAR RADICULOPATHY: ICD-10-CM

## 2022-12-06 DIAGNOSIS — M47.816 LUMBAR SPONDYLOSIS: Primary | ICD-10-CM

## 2022-12-06 DIAGNOSIS — M51.36 DDD (DEGENERATIVE DISC DISEASE), LUMBAR: ICD-10-CM

## 2022-12-06 PROCEDURE — 1159F PR MEDICATION LIST DOCUMENTED IN MEDICAL RECORD: ICD-10-PCS | Mod: CPTII,S$GLB,, | Performed by: ANESTHESIOLOGY

## 2022-12-06 PROCEDURE — 3080F DIAST BP >= 90 MM HG: CPT | Mod: CPTII,S$GLB,, | Performed by: ANESTHESIOLOGY

## 2022-12-06 PROCEDURE — 99213 OFFICE O/P EST LOW 20 MIN: CPT | Mod: S$GLB,,, | Performed by: ANESTHESIOLOGY

## 2022-12-06 PROCEDURE — 3077F SYST BP >= 140 MM HG: CPT | Mod: CPTII,S$GLB,, | Performed by: ANESTHESIOLOGY

## 2022-12-06 PROCEDURE — 3008F BODY MASS INDEX DOCD: CPT | Mod: CPTII,S$GLB,, | Performed by: ANESTHESIOLOGY

## 2022-12-06 PROCEDURE — 99999 PR PBB SHADOW E&M-EST. PATIENT-LVL IV: CPT | Mod: PBBFAC,,, | Performed by: ANESTHESIOLOGY

## 2022-12-06 PROCEDURE — 3008F PR BODY MASS INDEX (BMI) DOCUMENTED: ICD-10-PCS | Mod: CPTII,S$GLB,, | Performed by: ANESTHESIOLOGY

## 2022-12-06 PROCEDURE — 99213 PR OFFICE/OUTPT VISIT, EST, LEVL III, 20-29 MIN: ICD-10-PCS | Mod: S$GLB,,, | Performed by: ANESTHESIOLOGY

## 2022-12-06 PROCEDURE — 3077F PR MOST RECENT SYSTOLIC BLOOD PRESSURE >= 140 MM HG: ICD-10-PCS | Mod: CPTII,S$GLB,, | Performed by: ANESTHESIOLOGY

## 2022-12-06 PROCEDURE — 3080F PR MOST RECENT DIASTOLIC BLOOD PRESSURE >= 90 MM HG: ICD-10-PCS | Mod: CPTII,S$GLB,, | Performed by: ANESTHESIOLOGY

## 2022-12-06 PROCEDURE — 1159F MED LIST DOCD IN RCRD: CPT | Mod: CPTII,S$GLB,, | Performed by: ANESTHESIOLOGY

## 2022-12-06 PROCEDURE — 99999 PR PBB SHADOW E&M-EST. PATIENT-LVL IV: ICD-10-PCS | Mod: PBBFAC,,, | Performed by: ANESTHESIOLOGY

## 2022-12-06 NOTE — PROGRESS NOTES
Interventional Pain Progress Note       Referring Physician: No ref. provider found    PCP: Rayray Mann MD    Chief Complaint:   Low Back Pain    Interval History (12/06/2022):  Patient returns to clinic after procedure.  Patient reports resolution of bilateral lower extremity pain but no change in axial lower back pain after bilateral L5-S1 transforaminal epidural steroid injection on 11/09/2022.  Pain is described as an aching stabbing pain in a band across her lower back.  Pain is worse with extension and rotation, better with flexion and heat.  Pain is rated a 7/10. Denies any fevers, chills, changes in gait, weakness, or bowel and bladder incontinence         SUBJECTIVE:    Kentrell Aguilera is a 62 y.o. female who presents to the clinic for the evaluation of lower back pain. The pain started 6 months ago and symptoms have been worsening.  Pain is described as a sharp stabbing pain that starts in a band across her lower back and radiates down her bilateral lower extremities to the ankles.  Pain is worse with flexion, extension, and standing, better with ice.  Pain is rated 10/10. Denies any fevers, chills, changes in gait, weakness, or bowel and bladder incontinence      Non-Pharmacologic Treatments:  Physical Therapy/Home Exercise: yes  Ice/Heat:yes  TENS: no  Acupuncture: no  Massage: no  Chiropractic: no        Previous Pain Medications:  NSAIDs, Tylenol, neuropathic, topicals     report:  Reviewed and consistent with medication use as prescribed.    Pain Procedures:   -11/09/2022:  Bilateral L5-S1 transforaminal epidural steroid injection, resolution of bilateral lower extremity pain, no relief in axial low back pain      Imaging:     Results for orders placed during the hospital encounter of 10/24/22    MRI Lumbar Spine Without Contrast    Narrative  EXAMINATION:  MRI LUMBAR SPINE WITHOUT CONTRAST    CLINICAL HISTORY:  Low back pain, symptoms persist with > 6wks conservative treatment; Dorsalgia,  unspecified    TECHNIQUE:  Multiplanar, multisequence MR images were acquired from the thoracolumbar junction to the sacrum without the administration of contrast.    COMPARISON:  X-ray dated 05/13/2021    FINDINGS:  There are 5 non-rib-bearing lumbar vertebrae.  There is mild grade 1 anterolisthesis of L4 on L5.  Alignment is otherwise unremarkable.  There is no acute fracture or compression deformity.  No aggressive focal signal abnormality.  No pars interarticularis defect.  Mild degenerative endplate changes noted including Modic type 1 edema at the anterior inferior L2 endplate and anterior superior L5 endplate.    Conus medullaris terminates at the T12-L1 level.  Conus medullaris is normal in size and signal.    T12-L1: No significant disc pathology.  No significant spinal canal or neural foraminal stenosis.    L1-L2: No significant disc pathology.  No significant spinal canal or neural foraminal stenosis.    L2-L3: Mild disc desiccation and small circumferential disc bulge with superimposed small central protrusion.  Mild bilateral facet arthropathy.  No significant spinal canal or neural foraminal stenosis.    L3-L4: No significant disc pathology.  Mild bilateral facet arthropathy.  No significant spinal canal or neural foraminal stenosis.    L4-L5: Mild disc desiccation.  Bilateral facet arthropathy.  No significant spinal canal or neural foraminal stenosis.    L5-S1: Mild disc desiccation and asymmetric to the left small disc bulge.  Mild bilateral facet arthropathy.  No significant spinal canal stenosis.  Minimal left neural foraminal stenosis.    Paraspinal soft tissues are unremarkable.  Simple right renal cysts noted.  Visualized intra-abdominopelvic contents are otherwise unremarkable.    Impression  Multilevel degenerative changes as detailed above including grade 1 anterolisthesis of L4 on L5 as well as mild Modic type 1 edema at the L2 inferior endplate and L5 superior endplate.    Small disc bulge  with superimposed small central protrusion at L2-L3.  No resulting spinal canal stenosis.    Minimal left neural foraminal stenosis at L5-S1.  No other significant spinal canal or neural foraminal stenosis.      Electronically signed by: Jaison Avelar  Date:    10/24/2022  Time:    08:06      Results for orders placed during the hospital encounter of 21    X-Ray Lumbar Spine 5 View    Narrative  EXAMINATION:  XR LUMBAR SPINE COMPLETE 5 VIEW    CLINICAL HISTORY:  Dorsalgia, unspecifiedBack pain, progressive neurologic deficit;    COMPARISON:  None    FINDINGS:  Five views of the lumbar spine.    Moderate facet arthropathy L4/5 and L5/S1.  Minimal grade 1 anterolisthesis L4/5 with mild disc space narrowing.  Overall alignment is well maintained.  No evidence of spondylolysis. Vertebral body heights are normal.    Mild constipation    Impression  See above.      Electronically signed by: Jean Sahu MD  Date:    2021  Time:    08:10      History reviewed. No pertinent past medical history.  Past Surgical History:   Procedure Laterality Date    BREAST BIOPSY Left     negative  negative    SELECTIVE INJECTION OF ANESTHETIC AGENT AROUND LUMBAR SPINAL NERVE ROOT BY TRANSFORAMINAL APPROACH Bilateral 2022    Procedure: Bilateral L5/S1 TF LAUREN;  Surgeon: Salinas Jennings MD;  Location: [facility];  Service: Pain Management;  Laterality: Bilateral;     Social History     Socioeconomic History    Marital status: Single   Occupational History     Comment: not [employer]   Tobacco Use    Smoking status: Former     Packs/day: 0.50     Types: Cigarettes     Quit date:      Years since quittin.9    Smokeless tobacco: Never   Substance and Sexual Activity    Alcohol use: Yes     Family History   Problem Relation Age of Onset    COPD Mother     Cancer Mother         lung    Coronary artery disease Father     Diabetes Father     Hypertension Sister     No Known Problems Brother     COPD  "Sister        Review of patient's allergies indicates:   Allergen Reactions    Erythromycin     Pcn [penicillins]        Current Outpatient Medications   Medication Sig    doxycycline (VIBRA-TABS) 100 MG tablet Take 1 tablet (100 mg total) by mouth 2 (two) times daily.    gabapentin (NEURONTIN) 300 MG capsule Take 1 capsule (300 mg total) by mouth every evening. For back and leg pain    hydrocortisone 2.5 % cream APPLY  CREAM TO RASH TWICE DAILY    meclizine (ANTIVERT) 25 mg tablet Take 1 tablet (25 mg total) by mouth 3 (three) times daily as needed.    ofloxacin (OCUFLOX) 0.3 % ophthalmic solution     PROLENSA 0.07 % Drop SMARTSIG:In Eye(s)    ALPRAZolam (XANAX) 0.25 MG tablet Take 1 tablet (0.25 mg total) by mouth nightly as needed for Anxiety.    betamethasone dipropionate (DIPROLENE) 0.05 % ointment Apply topically 2 (two) times daily. for 10 days     No current facility-administered medications for this visit.         ROS  Review of Systems   Constitutional:  Negative for chills, diaphoresis, fatigue and fever.   Respiratory:  Negative for chest tightness, shortness of breath, wheezing and stridor.    Cardiovascular:  Negative for chest pain and leg swelling.   Gastrointestinal:  Negative for blood in stool, diarrhea, nausea and vomiting.   Endocrine: Negative for cold intolerance and heat intolerance.   Genitourinary:  Negative for dysuria, hematuria and urgency.   Musculoskeletal:  Positive for arthralgias, back pain, joint swelling and myalgias. Negative for gait problem, neck pain and neck stiffness.   Skin:  Negative for rash.   Neurological:  Negative for tremors, seizures, speech difficulty, weakness, light-headedness, numbness and headaches.   Hematological:  Does not bruise/bleed easily.   Psychiatric/Behavioral:  Negative for agitation, confusion and suicidal ideas. The patient is not nervous/anxious.           OBJECTIVE:  BP (!) 150/96   Pulse 83   Resp 17   Ht 5' 7" (1.702 m)   Wt 104.1 kg (229 " lb 8 oz)   BMI 35.94 kg/m²         Physical Exam  Constitutional:       General: She is not in acute distress.     Appearance: Normal appearance. She is not ill-appearing.   HENT:      Head: Normocephalic and atraumatic.      Nose: No congestion or rhinorrhea.      Mouth/Throat:      Mouth: Mucous membranes are moist.   Eyes:      Extraocular Movements: Extraocular movements intact.      Pupils: Pupils are equal, round, and reactive to light.   Cardiovascular:      Pulses: Normal pulses.   Pulmonary:      Effort: Pulmonary effort is normal.   Abdominal:      General: Abdomen is flat.      Palpations: Abdomen is soft.   Musculoskeletal:      Cervical back: Normal range of motion and neck supple.   Skin:     General: Skin is warm and dry.      Capillary Refill: Capillary refill takes less than 2 seconds.   Neurological:      Mental Status: She is alert and oriented to person, place, and time.      Cranial Nerves: No cranial nerve deficit.      Sensory: No sensory deficit.      Motor: No weakness or abnormal muscle tone.      Deep Tendon Reflexes:      Reflex Scores:       Patellar reflexes are 1+ on the right side and 1+ on the left side.       Achilles reflexes are 1+ on the right side and 1+ on the left side.     Comments:      Psychiatric:         Mood and Affect: Mood normal.         Behavior: Behavior normal.         Thought Content: Thought content normal.         Musculoskeletal:      Lumbar Exam  Incision: no  Pain with Flexion: no  Pain with Extension: yes  Paraspinous TTP:  Bilateral lumbar paraspinous  Facet TTP:  L5-S1  Facet Loading:  Positive bilaterally  SLR:  Negative bilaterally  SIJ TTP:  Negative bilaterally  SAULO:  Negative bilaterally      LABS:  Lab Results   Component Value Date    WBC 6.56 08/15/2022    HGB 15.2 08/15/2022    HCT 45.2 08/15/2022    MCV 89 08/15/2022     08/15/2022       CMP  Sodium   Date Value Ref Range Status   08/15/2022 139 136 - 145 mmol/L Final     Potassium    Date Value Ref Range Status   08/15/2022 3.8 3.5 - 5.1 mmol/L Final     Chloride   Date Value Ref Range Status   08/15/2022 105 95 - 110 mmol/L Final     CO2   Date Value Ref Range Status   08/15/2022 27 23 - 29 mmol/L Final     Glucose   Date Value Ref Range Status   08/15/2022 106 70 - 110 mg/dL Final     BUN   Date Value Ref Range Status   08/15/2022 15 7 - 17 mg/dL Final     Creatinine   Date Value Ref Range Status   08/15/2022 0.66 0.50 - 1.40 mg/dL Final     Calcium   Date Value Ref Range Status   08/15/2022 8.8 8.7 - 10.5 mg/dL Final     Total Protein   Date Value Ref Range Status   08/15/2022 7.0 6.0 - 8.4 g/dL Final     Albumin   Date Value Ref Range Status   08/15/2022 4.1 3.5 - 5.2 g/dL Final     Total Bilirubin   Date Value Ref Range Status   08/15/2022 0.6 0.1 - 1.0 mg/dL Final     Comment:     For infants and newborns, interpretation of results should be based  on gestational age, weight and in agreement with clinical  observations.    Premature Infant recommended reference ranges:  Up to 24 hours.............<8.0 mg/dL  Up to 48 hours............<12.0 mg/dL  3-5 days..................<15.0 mg/dL  6-29 days.................<15.0 mg/dL       Alkaline Phosphatase   Date Value Ref Range Status   08/15/2022 102 38 - 126 U/L Final     AST   Date Value Ref Range Status   08/15/2022 27 15 - 46 U/L Final     ALT   Date Value Ref Range Status   08/15/2022 29 10 - 44 U/L Final     Anion Gap   Date Value Ref Range Status   08/15/2022 7 (L) 8 - 16 mmol/L Final     eGFR if    Date Value Ref Range Status   12/29/2020 >60.0 >60 mL/min/1.73 m^2 Final     eGFR if non    Date Value Ref Range Status   12/29/2020 >60.0 >60 mL/min/1.73 m^2 Final     Comment:     Calculation used to obtain the estimated glomerular filtration  rate (eGFR) is the CKD-EPI equation.          No results found for: LABA1C, HGBA1C          ASSESSMENT:       62 y.o. year old female with lower back pain, consistent  with     1. Lumbar spondylosis  IR Facet Inj Lumbar 3rd Vert Bi    IR Facet Inj Lumbar 2nd Vert Bi    IR Facet Inj Lumbar 1st Vert Bi    Case Request-RAD/Other Procedure Area: Bilateral L3-5 MBB      2. Lumbar radiculopathy        3. DDD (degenerative disc disease), lumbar          Lumbar spondylosis  -     IR Facet Inj Lumbar 3rd Vert Bi; Future; Expected date: 12/06/2022  -     IR Facet Inj Lumbar 2nd Vert Bi; Future; Expected date: 12/06/2022  -     IR Facet Inj Lumbar 1st Vert Bi; Future; Expected date: 12/06/2022  -     Case Request-RAD/Other Procedure Area: Bilateral L3-5 MBB    Lumbar radiculopathy    DDD (degenerative disc disease), lumbar           PLAN:   - Interventions:    Schedule patient for bilateral L3-L5 medial branch block for facet mediated lower back pain.  -11/09/2022:  Bilateral L5-S1 transforaminal epidural steroid injection, resolution of bilateral lower extremity pain, no relief in axial low back pain  - Anticoagulation use:   no no anticoagulation    - Medications:   Continue gabapentin 300 mg at night    - Therapy:    Continue formal physical therapy    - Imaging/Diagnostic:   MRI lumbar spine reviewed.  Significant for bilateral foraminal stenosis at L5-S1, left greater than right, secondary to disc herniation.    - Consults:   None at this time        - Patient Questions: Answered all of the patient's questions regarding diagnosis, therapy, and treatment    - Follow up visit: return to clinic after procedure      The above plan and management options were discussed at length with patient. Patient is in agreement with the above and verbalized understanding.    I discussed the goals of interventional chronic pain management with the patient on today's visit.  I explained the utility of injections for diagnostic and therapeutic purposes.  We discussed a multimodal approach to pain including treating the patient's given worst pain at any given time.  We will use a systematic approach to  addressing pain.  We will also adopt a multimodal approach that includes injections, adjuvant medications, physical therapy, at times psychiatry.  There may be a limited role for opioid use intermittently in the treatment of pain, more particularly for acute pain although no one approach can be used as a sole treatment modality.    I emphasized the importance of regular exercise, core strengthening and stretching, diet and weight loss as a cornerstone of long-term pain management.      Salinas Jennings MD  Interventional Pain Management  [facility]    Disclaimer:  This note was prepared using voice recognition system and is likely to have sound alike errors that may have been overlooked even after proof reading.  Please call me with any questions

## 2022-12-06 NOTE — H&P (VIEW-ONLY)
Interventional Pain Progress Note       Referring Physician: No ref. provider found    PCP: Rayray Mann MD    Chief Complaint:   Low Back Pain    Interval History (12/06/2022):  Patient returns to clinic after procedure.  Patient reports resolution of bilateral lower extremity pain but no change in axial lower back pain after bilateral L5-S1 transforaminal epidural steroid injection on 11/09/2022.  Pain is described as an aching stabbing pain in a band across her lower back.  Pain is worse with extension and rotation, better with flexion and heat.  Pain is rated a 7/10. Denies any fevers, chills, changes in gait, weakness, or bowel and bladder incontinence         SUBJECTIVE:    Kentrell Aguilera is a 62 y.o. female who presents to the clinic for the evaluation of lower back pain. The pain started 6 months ago and symptoms have been worsening.  Pain is described as a sharp stabbing pain that starts in a band across her lower back and radiates down her bilateral lower extremities to the ankles.  Pain is worse with flexion, extension, and standing, better with ice.  Pain is rated 10/10. Denies any fevers, chills, changes in gait, weakness, or bowel and bladder incontinence      Non-Pharmacologic Treatments:  Physical Therapy/Home Exercise: yes  Ice/Heat:yes  TENS: no  Acupuncture: no  Massage: no  Chiropractic: no        Previous Pain Medications:  NSAIDs, Tylenol, neuropathic, topicals     report:  Reviewed and consistent with medication use as prescribed.    Pain Procedures:   -11/09/2022:  Bilateral L5-S1 transforaminal epidural steroid injection, resolution of bilateral lower extremity pain, no relief in axial low back pain      Imaging:     Results for orders placed during the hospital encounter of 10/24/22    MRI Lumbar Spine Without Contrast    Narrative  EXAMINATION:  MRI LUMBAR SPINE WITHOUT CONTRAST    CLINICAL HISTORY:  Low back pain, symptoms persist with > 6wks conservative treatment; Dorsalgia,  unspecified    TECHNIQUE:  Multiplanar, multisequence MR images were acquired from the thoracolumbar junction to the sacrum without the administration of contrast.    COMPARISON:  X-ray dated 05/13/2021    FINDINGS:  There are 5 non-rib-bearing lumbar vertebrae.  There is mild grade 1 anterolisthesis of L4 on L5.  Alignment is otherwise unremarkable.  There is no acute fracture or compression deformity.  No aggressive focal signal abnormality.  No pars interarticularis defect.  Mild degenerative endplate changes noted including Modic type 1 edema at the anterior inferior L2 endplate and anterior superior L5 endplate.    Conus medullaris terminates at the T12-L1 level.  Conus medullaris is normal in size and signal.    T12-L1: No significant disc pathology.  No significant spinal canal or neural foraminal stenosis.    L1-L2: No significant disc pathology.  No significant spinal canal or neural foraminal stenosis.    L2-L3: Mild disc desiccation and small circumferential disc bulge with superimposed small central protrusion.  Mild bilateral facet arthropathy.  No significant spinal canal or neural foraminal stenosis.    L3-L4: No significant disc pathology.  Mild bilateral facet arthropathy.  No significant spinal canal or neural foraminal stenosis.    L4-L5: Mild disc desiccation.  Bilateral facet arthropathy.  No significant spinal canal or neural foraminal stenosis.    L5-S1: Mild disc desiccation and asymmetric to the left small disc bulge.  Mild bilateral facet arthropathy.  No significant spinal canal stenosis.  Minimal left neural foraminal stenosis.    Paraspinal soft tissues are unremarkable.  Simple right renal cysts noted.  Visualized intra-abdominopelvic contents are otherwise unremarkable.    Impression  Multilevel degenerative changes as detailed above including grade 1 anterolisthesis of L4 on L5 as well as mild Modic type 1 edema at the L2 inferior endplate and L5 superior endplate.    Small disc bulge  with superimposed small central protrusion at L2-L3.  No resulting spinal canal stenosis.    Minimal left neural foraminal stenosis at L5-S1.  No other significant spinal canal or neural foraminal stenosis.      Electronically signed by: Jaison Avelar  Date:    10/24/2022  Time:    08:06      Results for orders placed during the hospital encounter of 21    X-Ray Lumbar Spine 5 View    Narrative  EXAMINATION:  XR LUMBAR SPINE COMPLETE 5 VIEW    CLINICAL HISTORY:  Dorsalgia, unspecifiedBack pain, progressive neurologic deficit;    COMPARISON:  None    FINDINGS:  Five views of the lumbar spine.    Moderate facet arthropathy L4/5 and L5/S1.  Minimal grade 1 anterolisthesis L4/5 with mild disc space narrowing.  Overall alignment is well maintained.  No evidence of spondylolysis. Vertebral body heights are normal.    Mild constipation    Impression  See above.      Electronically signed by: Jaen Sahu MD  Date:    2021  Time:    08:10      History reviewed. No pertinent past medical history.  Past Surgical History:   Procedure Laterality Date    BREAST BIOPSY Left     negative  negative    SELECTIVE INJECTION OF ANESTHETIC AGENT AROUND LUMBAR SPINAL NERVE ROOT BY TRANSFORAMINAL APPROACH Bilateral 2022    Procedure: Bilateral L5/S1 TF LAUREN;  Surgeon: Salinas Jennings MD;  Location: [facility];  Service: Pain Management;  Laterality: Bilateral;     Social History     Socioeconomic History    Marital status: Single   Occupational History     Comment: not [employer]   Tobacco Use    Smoking status: Former     Packs/day: 0.50     Types: Cigarettes     Quit date:      Years since quittin.9    Smokeless tobacco: Never   Substance and Sexual Activity    Alcohol use: Yes     Family History   Problem Relation Age of Onset    COPD Mother     Cancer Mother         lung    Coronary artery disease Father     Diabetes Father     Hypertension Sister     No Known Problems Brother     COPD  "Sister        Review of patient's allergies indicates:   Allergen Reactions    Erythromycin     Pcn [penicillins]        Current Outpatient Medications   Medication Sig    doxycycline (VIBRA-TABS) 100 MG tablet Take 1 tablet (100 mg total) by mouth 2 (two) times daily.    gabapentin (NEURONTIN) 300 MG capsule Take 1 capsule (300 mg total) by mouth every evening. For back and leg pain    hydrocortisone 2.5 % cream APPLY  CREAM TO RASH TWICE DAILY    meclizine (ANTIVERT) 25 mg tablet Take 1 tablet (25 mg total) by mouth 3 (three) times daily as needed.    ofloxacin (OCUFLOX) 0.3 % ophthalmic solution     PROLENSA 0.07 % Drop SMARTSIG:In Eye(s)    ALPRAZolam (XANAX) 0.25 MG tablet Take 1 tablet (0.25 mg total) by mouth nightly as needed for Anxiety.    betamethasone dipropionate (DIPROLENE) 0.05 % ointment Apply topically 2 (two) times daily. for 10 days     No current facility-administered medications for this visit.         ROS  Review of Systems   Constitutional:  Negative for chills, diaphoresis, fatigue and fever.   Respiratory:  Negative for chest tightness, shortness of breath, wheezing and stridor.    Cardiovascular:  Negative for chest pain and leg swelling.   Gastrointestinal:  Negative for blood in stool, diarrhea, nausea and vomiting.   Endocrine: Negative for cold intolerance and heat intolerance.   Genitourinary:  Negative for dysuria, hematuria and urgency.   Musculoskeletal:  Positive for arthralgias, back pain, joint swelling and myalgias. Negative for gait problem, neck pain and neck stiffness.   Skin:  Negative for rash.   Neurological:  Negative for tremors, seizures, speech difficulty, weakness, light-headedness, numbness and headaches.   Hematological:  Does not bruise/bleed easily.   Psychiatric/Behavioral:  Negative for agitation, confusion and suicidal ideas. The patient is not nervous/anxious.           OBJECTIVE:  BP (!) 150/96   Pulse 83   Resp 17   Ht 5' 7" (1.702 m)   Wt 104.1 kg (229 " lb 8 oz)   BMI 35.94 kg/m²         Physical Exam  Constitutional:       General: She is not in acute distress.     Appearance: Normal appearance. She is not ill-appearing.   HENT:      Head: Normocephalic and atraumatic.      Nose: No congestion or rhinorrhea.      Mouth/Throat:      Mouth: Mucous membranes are moist.   Eyes:      Extraocular Movements: Extraocular movements intact.      Pupils: Pupils are equal, round, and reactive to light.   Cardiovascular:      Pulses: Normal pulses.   Pulmonary:      Effort: Pulmonary effort is normal.   Abdominal:      General: Abdomen is flat.      Palpations: Abdomen is soft.   Musculoskeletal:      Cervical back: Normal range of motion and neck supple.   Skin:     General: Skin is warm and dry.      Capillary Refill: Capillary refill takes less than 2 seconds.   Neurological:      Mental Status: She is alert and oriented to person, place, and time.      Cranial Nerves: No cranial nerve deficit.      Sensory: No sensory deficit.      Motor: No weakness or abnormal muscle tone.      Deep Tendon Reflexes:      Reflex Scores:       Patellar reflexes are 1+ on the right side and 1+ on the left side.       Achilles reflexes are 1+ on the right side and 1+ on the left side.     Comments:      Psychiatric:         Mood and Affect: Mood normal.         Behavior: Behavior normal.         Thought Content: Thought content normal.         Musculoskeletal:      Lumbar Exam  Incision: no  Pain with Flexion: no  Pain with Extension: yes  Paraspinous TTP:  Bilateral lumbar paraspinous  Facet TTP:  L5-S1  Facet Loading:  Positive bilaterally  SLR:  Negative bilaterally  SIJ TTP:  Negative bilaterally  SAULO:  Negative bilaterally      LABS:  Lab Results   Component Value Date    WBC 6.56 08/15/2022    HGB 15.2 08/15/2022    HCT 45.2 08/15/2022    MCV 89 08/15/2022     08/15/2022       CMP  Sodium   Date Value Ref Range Status   08/15/2022 139 136 - 145 mmol/L Final     Potassium    Date Value Ref Range Status   08/15/2022 3.8 3.5 - 5.1 mmol/L Final     Chloride   Date Value Ref Range Status   08/15/2022 105 95 - 110 mmol/L Final     CO2   Date Value Ref Range Status   08/15/2022 27 23 - 29 mmol/L Final     Glucose   Date Value Ref Range Status   08/15/2022 106 70 - 110 mg/dL Final     BUN   Date Value Ref Range Status   08/15/2022 15 7 - 17 mg/dL Final     Creatinine   Date Value Ref Range Status   08/15/2022 0.66 0.50 - 1.40 mg/dL Final     Calcium   Date Value Ref Range Status   08/15/2022 8.8 8.7 - 10.5 mg/dL Final     Total Protein   Date Value Ref Range Status   08/15/2022 7.0 6.0 - 8.4 g/dL Final     Albumin   Date Value Ref Range Status   08/15/2022 4.1 3.5 - 5.2 g/dL Final     Total Bilirubin   Date Value Ref Range Status   08/15/2022 0.6 0.1 - 1.0 mg/dL Final     Comment:     For infants and newborns, interpretation of results should be based  on gestational age, weight and in agreement with clinical  observations.    Premature Infant recommended reference ranges:  Up to 24 hours.............<8.0 mg/dL  Up to 48 hours............<12.0 mg/dL  3-5 days..................<15.0 mg/dL  6-29 days.................<15.0 mg/dL       Alkaline Phosphatase   Date Value Ref Range Status   08/15/2022 102 38 - 126 U/L Final     AST   Date Value Ref Range Status   08/15/2022 27 15 - 46 U/L Final     ALT   Date Value Ref Range Status   08/15/2022 29 10 - 44 U/L Final     Anion Gap   Date Value Ref Range Status   08/15/2022 7 (L) 8 - 16 mmol/L Final     eGFR if    Date Value Ref Range Status   12/29/2020 >60.0 >60 mL/min/1.73 m^2 Final     eGFR if non    Date Value Ref Range Status   12/29/2020 >60.0 >60 mL/min/1.73 m^2 Final     Comment:     Calculation used to obtain the estimated glomerular filtration  rate (eGFR) is the CKD-EPI equation.          No results found for: LABA1C, HGBA1C          ASSESSMENT:       62 y.o. year old female with lower back pain, consistent  with     1. Lumbar spondylosis  IR Facet Inj Lumbar 3rd Vert Bi    IR Facet Inj Lumbar 2nd Vert Bi    IR Facet Inj Lumbar 1st Vert Bi    Case Request-RAD/Other Procedure Area: Bilateral L3-5 MBB      2. Lumbar radiculopathy        3. DDD (degenerative disc disease), lumbar          Lumbar spondylosis  -     IR Facet Inj Lumbar 3rd Vert Bi; Future; Expected date: 12/06/2022  -     IR Facet Inj Lumbar 2nd Vert Bi; Future; Expected date: 12/06/2022  -     IR Facet Inj Lumbar 1st Vert Bi; Future; Expected date: 12/06/2022  -     Case Request-RAD/Other Procedure Area: Bilateral L3-5 MBB    Lumbar radiculopathy    DDD (degenerative disc disease), lumbar           PLAN:   - Interventions:    Schedule patient for bilateral L3-L5 medial branch block for facet mediated lower back pain.  -11/09/2022:  Bilateral L5-S1 transforaminal epidural steroid injection, resolution of bilateral lower extremity pain, no relief in axial low back pain  - Anticoagulation use:   no no anticoagulation    - Medications:   Continue gabapentin 300 mg at night    - Therapy:    Continue formal physical therapy    - Imaging/Diagnostic:   MRI lumbar spine reviewed.  Significant for bilateral foraminal stenosis at L5-S1, left greater than right, secondary to disc herniation.    - Consults:   None at this time        - Patient Questions: Answered all of the patient's questions regarding diagnosis, therapy, and treatment    - Follow up visit: return to clinic after procedure      The above plan and management options were discussed at length with patient. Patient is in agreement with the above and verbalized understanding.    I discussed the goals of interventional chronic pain management with the patient on today's visit.  I explained the utility of injections for diagnostic and therapeutic purposes.  We discussed a multimodal approach to pain including treating the patient's given worst pain at any given time.  We will use a systematic approach to  addressing pain.  We will also adopt a multimodal approach that includes injections, adjuvant medications, physical therapy, at times psychiatry.  There may be a limited role for opioid use intermittently in the treatment of pain, more particularly for acute pain although no one approach can be used as a sole treatment modality.    I emphasized the importance of regular exercise, core strengthening and stretching, diet and weight loss as a cornerstone of long-term pain management.      Salinas Jennings MD  Interventional Pain Management  [facility]    Disclaimer:  This note was prepared using voice recognition system and is likely to have sound alike errors that may have been overlooked even after proof reading.  Please call me with any questions

## 2022-12-12 NOTE — PRE-PROCEDURE INSTRUCTIONS
Spoke with patient regarding procedure scheduled on 12.19    Arrival time 0745    Has patient been sick with fever or on antibiotics within the last 7 days? No    Does the patient have any open wounds, sores or rashes? No    Does the patient have any recent fractures? no    Has patient received a vaccination within the last 7 days? No    Received the COVID vaccination? yes    Has the patient stopped all medications as directed? NA    Does patient have a pacemaker and or defibrillator? no    Does the patient have a ride to and from procedure and someone reliable to remain with patient? Friend godfrey     Is the patient diabetic? no    Does the patient have sleep apnea? Or use O2 at home? No and no     Is the patient receiving sedation? yes    Is the patient instructed to remain NPO beginning at midnight the night before their procedure? yes    Procedure location confirmed with patient? Yes    Covid- Denies signs/symptoms. Instructed to notify PAT/MD if any changes.

## 2022-12-13 ENCOUNTER — OFFICE VISIT (OUTPATIENT)
Dept: SPORTS MEDICINE | Facility: CLINIC | Age: 62
End: 2022-12-13
Payer: COMMERCIAL

## 2022-12-13 ENCOUNTER — PATIENT MESSAGE (OUTPATIENT)
Dept: SPORTS MEDICINE | Facility: CLINIC | Age: 62
End: 2022-12-13

## 2022-12-13 VITALS — WEIGHT: 237.19 LBS | HEIGHT: 67 IN | BODY MASS INDEX: 37.23 KG/M2

## 2022-12-13 DIAGNOSIS — M17.0 BILATERAL PRIMARY OSTEOARTHRITIS OF KNEE: Primary | ICD-10-CM

## 2022-12-13 PROCEDURE — 99499 NO LOS: ICD-10-PCS | Mod: S$GLB,,, | Performed by: STUDENT IN AN ORGANIZED HEALTH CARE EDUCATION/TRAINING PROGRAM

## 2022-12-13 PROCEDURE — 99499 UNLISTED E&M SERVICE: CPT | Mod: S$GLB,,, | Performed by: STUDENT IN AN ORGANIZED HEALTH CARE EDUCATION/TRAINING PROGRAM

## 2022-12-13 PROCEDURE — 3008F BODY MASS INDEX DOCD: CPT | Mod: CPTII,S$GLB,, | Performed by: STUDENT IN AN ORGANIZED HEALTH CARE EDUCATION/TRAINING PROGRAM

## 2022-12-13 PROCEDURE — 20610 DRAIN/INJ JOINT/BURSA W/O US: CPT | Mod: 50,S$GLB,, | Performed by: STUDENT IN AN ORGANIZED HEALTH CARE EDUCATION/TRAINING PROGRAM

## 2022-12-13 PROCEDURE — 99999 PR PBB SHADOW E&M-EST. PATIENT-LVL III: CPT | Mod: PBBFAC,,, | Performed by: STUDENT IN AN ORGANIZED HEALTH CARE EDUCATION/TRAINING PROGRAM

## 2022-12-13 PROCEDURE — 1159F PR MEDICATION LIST DOCUMENTED IN MEDICAL RECORD: ICD-10-PCS | Mod: CPTII,S$GLB,, | Performed by: STUDENT IN AN ORGANIZED HEALTH CARE EDUCATION/TRAINING PROGRAM

## 2022-12-13 PROCEDURE — 1159F MED LIST DOCD IN RCRD: CPT | Mod: CPTII,S$GLB,, | Performed by: STUDENT IN AN ORGANIZED HEALTH CARE EDUCATION/TRAINING PROGRAM

## 2022-12-13 PROCEDURE — 3008F PR BODY MASS INDEX (BMI) DOCUMENTED: ICD-10-PCS | Mod: CPTII,S$GLB,, | Performed by: STUDENT IN AN ORGANIZED HEALTH CARE EDUCATION/TRAINING PROGRAM

## 2022-12-13 PROCEDURE — 99999 PR PBB SHADOW E&M-EST. PATIENT-LVL III: ICD-10-PCS | Mod: PBBFAC,,, | Performed by: STUDENT IN AN ORGANIZED HEALTH CARE EDUCATION/TRAINING PROGRAM

## 2022-12-13 PROCEDURE — 20610 LARGE JOINT ASPIRATION/INJECTION: BILATERAL KNEE: ICD-10-PCS | Mod: 50,S$GLB,, | Performed by: STUDENT IN AN ORGANIZED HEALTH CARE EDUCATION/TRAINING PROGRAM

## 2022-12-13 NOTE — PROCEDURES
Large Joint Aspiration/Injection: bilateral knee    Date/Time: 12/13/2022 8:00 AM  Performed by: Rajendra Lincoln MD  Authorized by: Rajendra Lincoln MD     Consent Done?:  Yes (Verbal)  Indications:  Pain, joint swelling and diagnostic evaluation  Site marked: the procedure site was marked    Timeout: prior to procedure the correct patient, procedure, and site was verified    Prep: patient was prepped and draped in usual sterile fashion      Local anesthesia used?: Yes    Local anesthetic:  Lidocaine spray    Details:  Needle Size:  18 G  Approach:  Anteromedial  Location:  Knee  Laterality:  Bilateral  Site:  Bilateral knee  Medications (Right):  60 mg hyaluronate sodium, stabilized 60 mg/3 mL  Medications (Left):  60 mg hyaluronate sodium, stabilized 60 mg/3 mL  Patient tolerance:  Patient tolerated the procedure well with no immediate complications

## 2022-12-13 NOTE — PROGRESS NOTES
Kentrell Aguilera is a 62 y.o. female presents for Durolane injection to bilateral knee.    See procedure note for details.    Pseudo-septic reaction discussed.    Follow-up in 3 months.

## 2022-12-14 ENCOUNTER — PATIENT MESSAGE (OUTPATIENT)
Dept: SPORTS MEDICINE | Facility: CLINIC | Age: 62
End: 2022-12-14
Payer: COMMERCIAL

## 2022-12-14 ENCOUNTER — TELEPHONE (OUTPATIENT)
Dept: SPORTS MEDICINE | Facility: CLINIC | Age: 62
End: 2022-12-14
Payer: COMMERCIAL

## 2022-12-14 ENCOUNTER — TELEPHONE (OUTPATIENT)
Dept: FAMILY MEDICINE | Facility: CLINIC | Age: 62
End: 2022-12-14
Payer: COMMERCIAL

## 2022-12-14 NOTE — TELEPHONE ENCOUNTER
Spoke with pt in regards to her injection appt that was on yesterday. Pt stated that she has not been able to walk since her injections. Pt message was routed to [facility] for further assistance. SMA did speak with pt in regards to her injection appt on yesterday. Telephone call confirmed.

## 2022-12-14 NOTE — TELEPHONE ENCOUNTER
Spoke with patient is response to message left regarding pain in bilateral knees 24 hours after receiving bilateral Durolane injections. She states that she can not walk due to stiffness and pain. She contacted her PCP as well and he has prescribed her some anti inflammatories. It was discussed that pain and soreness in the injections sites are not uncommon. She was instructed to keep an eye on her symptoms and to look out for any signs of infection or fever. She was instructed to watch her symptoms over the next few days and to let us know if her symptoms do not improve. She understood.

## 2022-12-19 ENCOUNTER — HOSPITAL ENCOUNTER (OUTPATIENT)
Facility: HOSPITAL | Age: 62
Discharge: HOME | End: 2022-12-19
Attending: ANESTHESIOLOGY | Admitting: ANESTHESIOLOGY
Payer: COMMERCIAL

## 2022-12-19 VITALS
RESPIRATION RATE: 16 BRPM | DIASTOLIC BLOOD PRESSURE: 72 MMHG | TEMPERATURE: 98 F | HEIGHT: 67 IN | HEART RATE: 69 BPM | OXYGEN SATURATION: 97 % | WEIGHT: 238.13 LBS | BODY MASS INDEX: 37.37 KG/M2 | SYSTOLIC BLOOD PRESSURE: 120 MMHG

## 2022-12-19 DIAGNOSIS — M47.816 LUMBAR SPONDYLOSIS: Primary | ICD-10-CM

## 2022-12-19 PROCEDURE — 64494 INJ PARAVERT F JNT L/S 2 LEV: CPT | Mod: 50 | Performed by: ANESTHESIOLOGY

## 2022-12-19 PROCEDURE — 64493 INJ PARAVERT F JNT L/S 1 LEV: CPT | Mod: 50 | Performed by: ANESTHESIOLOGY

## 2022-12-19 PROCEDURE — 64494 INJ PARAVERT F JNT L/S 2 LEV: CPT | Mod: 50,,, | Performed by: ANESTHESIOLOGY

## 2022-12-19 PROCEDURE — 63600175 PHARM REV CODE 636 W HCPCS: Performed by: ANESTHESIOLOGY

## 2022-12-19 PROCEDURE — 64493 PR INJ DX/THER AGNT PARAVERT FACET JOINT,IMG GUIDE,LUMBAR/SAC,1ST LVL: ICD-10-PCS | Mod: 50,,, | Performed by: ANESTHESIOLOGY

## 2022-12-19 PROCEDURE — 64494 PR INJ DX/THER AGNT PARAVERT FACET JOINT,IMG GUIDE,LUMBAR/SAC, 2ND LEVEL: ICD-10-PCS | Mod: 50,,, | Performed by: ANESTHESIOLOGY

## 2022-12-19 PROCEDURE — 25000003 PHARM REV CODE 250: Performed by: ANESTHESIOLOGY

## 2022-12-19 PROCEDURE — 64493 INJ PARAVERT F JNT L/S 1 LEV: CPT | Mod: 50,,, | Performed by: ANESTHESIOLOGY

## 2022-12-19 RX ORDER — FENTANYL CITRATE 50 UG/ML
INJECTION, SOLUTION INTRAMUSCULAR; INTRAVENOUS
Status: DISCONTINUED | OUTPATIENT
Start: 2022-12-19 | End: 2022-12-19 | Stop reason: HOSPADM

## 2022-12-19 RX ORDER — BUPIVACAINE HYDROCHLORIDE 5 MG/ML
INJECTION, SOLUTION EPIDURAL; INTRACAUDAL
Status: DISCONTINUED | OUTPATIENT
Start: 2022-12-19 | End: 2022-12-19 | Stop reason: HOSPADM

## 2022-12-19 RX ORDER — MIDAZOLAM HYDROCHLORIDE 1 MG/ML
INJECTION, SOLUTION INTRAMUSCULAR; INTRAVENOUS
Status: DISCONTINUED | OUTPATIENT
Start: 2022-12-19 | End: 2022-12-19 | Stop reason: HOSPADM

## 2022-12-19 RX ORDER — ONDANSETRON 2 MG/ML
4 INJECTION INTRAMUSCULAR; INTRAVENOUS ONCE AS NEEDED
Status: DISCONTINUED | OUTPATIENT
Start: 2022-12-19 | End: 2022-12-19 | Stop reason: HOSPADM

## 2022-12-19 NOTE — OP NOTE
LUMBAR Medial Branch Block Under Fluoroscopy    INFORMED CONSENT: The procedure, risks, benefits and options were discussed with patient. There are no contraindications to the procedure. The patient expressed understanding and agreed to proceed. The personnel performing the procedure was discussed.    Date of procedure 12/19/2022    Time-out taken to identify patient and procedure side prior to starting the procedure.                                                                PROCEDURE:  Bilateral medial branch block at the transverse processes at the level of L4, L5, and sacral ala    Pre Procedure diagnosis:    Lumbar spondylosis [M47.816]  1. Lumbar spondylosis        Post-Procedure diagnosis:   same    PHYSICIAN: Salinas Jennings MD  ASSISTANTS: None    MEDICATIONS INJECTED: 0.5% bupivicane, 1mL at each level    LOCAL ANESTHETIC USED: Lidocaine, 1%, 1ml at each level    ESTIMATED BLOOD LOSS:  None.    COMPLICATIONS:  None.    Sedation: Conscious sedation provided by M.RASHMI    SEDATION MEDICATIONS: local/IV sedation: Versed 2 mg and fentanyl 50 mcg IV.  Conscious sedation ordered by MD.  Patient reevaluated and sedation administered by MD and monitored by RN.  Total sedation time was less than 10 min.    Total sedation time was <10 min      TECHNIQUE: Laying in a prone position, the patient was prepped and draped in the usual sterile fashion using ChloraPrep and fenestrated drape.  The level was determined under fluoroscopic guidance.  Local anesthetic was given by going down to the hub of the 27-gauge 1.25in needle and raising a wheel.  A 25-gauge 3.5inch needle was introduced to the anatomic local of the medial branch at each of the above levels using fluoroscopy in the AP, oblique, and lateral views.  After negative aspiration, medication was injected slowly. The patient tolerated the procedure well.       The patient was monitored after the procedure.  Patient was given post procedure and discharge  instructions to follow at home.  We will see the patient back in two weeks or the patient may call to inform of status. The patient was discharged in a stable condition

## 2022-12-19 NOTE — DISCHARGE SUMMARY
Discharge Note  Short Stay      SUMMARY     Admit Date: 12/19/2022    Attending Physician: Salinas Jennings MD        Discharge Physician: Salinas Jennings MD        Discharge Date: 12/19/2022 8:39 AM    Procedure(s) (LRB):  Bilateral L3-5 MBB (Bilateral)    Final Diagnosis: Lumbar spondylosis [M47.816]    Disposition: Home or self care    Patient Instructions:   Current Discharge Medication List        CONTINUE these medications which have NOT CHANGED    Details   ALPRAZolam (XANAX) 0.25 MG tablet Take 1 tablet (0.25 mg total) by mouth nightly as needed for Anxiety.  Qty: 30 tablet, Refills: 2      gabapentin (NEURONTIN) 300 MG capsule Take 1 capsule (300 mg total) by mouth every evening. For back and leg pain  Qty: 30 capsule, Refills: 11      meclizine (ANTIVERT) 25 mg tablet Take 1 tablet (25 mg total) by mouth 3 (three) times daily as needed.  Qty: 100 tablet, Refills: 11      betamethasone dipropionate (DIPROLENE) 0.05 % ointment Apply topically 2 (two) times daily. for 10 days  Qty: 45 g, Refills: 0      doxycycline (VIBRA-TABS) 100 MG tablet Take 1 tablet (100 mg total) by mouth 2 (two) times daily.  Qty: 20 tablet, Refills: 0      hydrocortisone 2.5 % cream APPLY  CREAM TO RASH TWICE DAILY  Qty: 28 g, Refills: 11    Comments: Please consider 90 day supplies to promote better adherence      ofloxacin (OCUFLOX) 0.3 % ophthalmic solution       PROLENSA 0.07 % Drop SMARTSIG:In Eye(s)                 Discharge Diagnosis: Lumbar spondylosis [M47.816]  Condition on Discharge: Stable with no complications to procedure   Diet on Discharge: Same as before.  Activity: as per instruction sheet.  Discharge to: Home with a responsible adult.  Follow up: 2-4 weeks       Please call the office at [phone] if you experience any weakness or loss of sensation, fever > 101.5, pain uncontrolled with oral medications, persistent nausea/vomiting/or diarrhea, redness or drainage from the incisions, or any other worrisome  concerns. If physician on call was not reached or could not communicate with our office for any reason please go to the nearest emergency department

## 2022-12-19 NOTE — PLAN OF CARE
Pt and friends verbalized understanding of discharge instructions. Bandaids x 4 to lower back c/d/i. Patient voiced no complaints, with no further questions at this time. Patient stood at side of bed, walked steps with no new motor deficits. Neuro intact. VSS on RA. Recovery care complete.

## 2022-12-19 NOTE — DISCHARGE INSTRUCTIONS

## 2022-12-20 DIAGNOSIS — M47.816 LUMBAR SPONDYLOSIS: Primary | ICD-10-CM

## 2022-12-20 NOTE — PROGRESS NOTES
Patient reports 100% relief for 8 hours after bilateral L3-L5 medial branch block on 12/19/2022.  We will schedule patient for 2nd confirmatory medial branch block.

## 2022-12-21 ENCOUNTER — TELEPHONE (OUTPATIENT)
Dept: PAIN MEDICINE | Facility: CLINIC | Age: 62
End: 2022-12-21
Payer: COMMERCIAL

## 2023-01-03 NOTE — PRE-PROCEDURE INSTRUCTIONS
Spoke with patient regarding procedure scheduled on 1.9     Arrival time 0630     Has patient been sick with fever or on antibiotics within the last 7 days? No     Does the patient have any open wounds, sores or rashes? No     Does the patient have any recent fractures? no     Has patient received a vaccination within the last 7 days? No     Received the COVID vaccination?      Has the patient stopped all medications as directed? NA     Does patient have a pacemaker and or defibrillator? no     Does the patient have a ride to and from procedure and someone reliable to remain with patient? dakota     Is the patient diabetic? no     Does the patient have sleep apnea? Or use O2 at home? No and no      Is the patient receiving sedation?      Is the patient instructed to remain NPO beginning at midnight the night before their procedure? yes     Procedure location confirmed with patient? Yes     Covid- Denies signs/symptoms. Instructed to notify PAT/MD if any changes.

## 2023-01-09 ENCOUNTER — HOSPITAL ENCOUNTER (OUTPATIENT)
Facility: HOSPITAL | Age: 63
Discharge: HOME | End: 2023-01-09
Attending: ANESTHESIOLOGY | Admitting: ANESTHESIOLOGY
Payer: COMMERCIAL

## 2023-01-09 VITALS
HEART RATE: 71 BPM | RESPIRATION RATE: 17 BRPM | DIASTOLIC BLOOD PRESSURE: 71 MMHG | HEIGHT: 67 IN | OXYGEN SATURATION: 98 % | BODY MASS INDEX: 37.96 KG/M2 | SYSTOLIC BLOOD PRESSURE: 127 MMHG | WEIGHT: 241.88 LBS | TEMPERATURE: 97 F

## 2023-01-09 DIAGNOSIS — M47.816 LUMBAR SPONDYLOSIS: Primary | ICD-10-CM

## 2023-01-09 PROCEDURE — 64493 PR INJ DX/THER AGNT PARAVERT FACET JOINT,IMG GUIDE,LUMBAR/SAC,1ST LVL: ICD-10-PCS | Mod: 50,,, | Performed by: ANESTHESIOLOGY

## 2023-01-09 PROCEDURE — 63600175 PHARM REV CODE 636 W HCPCS: Performed by: ANESTHESIOLOGY

## 2023-01-09 PROCEDURE — 64493 INJ PARAVERT F JNT L/S 1 LEV: CPT | Mod: 50,,, | Performed by: ANESTHESIOLOGY

## 2023-01-09 PROCEDURE — 64494 INJ PARAVERT F JNT L/S 2 LEV: CPT | Mod: 50 | Performed by: ANESTHESIOLOGY

## 2023-01-09 PROCEDURE — 64494 PR INJ DX/THER AGNT PARAVERT FACET JOINT,IMG GUIDE,LUMBAR/SAC, 2ND LEVEL: ICD-10-PCS | Mod: 50,,, | Performed by: ANESTHESIOLOGY

## 2023-01-09 PROCEDURE — 64494 INJ PARAVERT F JNT L/S 2 LEV: CPT | Mod: 50,,, | Performed by: ANESTHESIOLOGY

## 2023-01-09 PROCEDURE — 64493 INJ PARAVERT F JNT L/S 1 LEV: CPT | Mod: 50 | Performed by: ANESTHESIOLOGY

## 2023-01-09 PROCEDURE — 25000003 PHARM REV CODE 250: Performed by: ANESTHESIOLOGY

## 2023-01-09 RX ORDER — FENTANYL CITRATE 50 UG/ML
INJECTION, SOLUTION INTRAMUSCULAR; INTRAVENOUS
Status: DISCONTINUED | OUTPATIENT
Start: 2023-01-09 | End: 2023-01-09 | Stop reason: HOSPADM

## 2023-01-09 RX ORDER — BUPIVACAINE HYDROCHLORIDE 5 MG/ML
INJECTION, SOLUTION EPIDURAL; INTRACAUDAL
Status: DISCONTINUED | OUTPATIENT
Start: 2023-01-09 | End: 2023-01-09 | Stop reason: HOSPADM

## 2023-01-09 RX ORDER — MIDAZOLAM HYDROCHLORIDE 1 MG/ML
INJECTION, SOLUTION INTRAMUSCULAR; INTRAVENOUS
Status: DISCONTINUED | OUTPATIENT
Start: 2023-01-09 | End: 2023-01-09 | Stop reason: HOSPADM

## 2023-01-09 RX ORDER — ONDANSETRON 2 MG/ML
4 INJECTION INTRAMUSCULAR; INTRAVENOUS ONCE AS NEEDED
Status: DISCONTINUED | OUTPATIENT
Start: 2023-01-09 | End: 2023-01-09 | Stop reason: HOSPADM

## 2023-01-09 NOTE — DISCHARGE SUMMARY
Discharge Note  Short Stay      SUMMARY     Admit Date: 1/9/2023    Attending Physician: Salinas Jennings MD        Discharge Physician: Salinas Jennings MD        Discharge Date: 1/9/2023 8:04 AM    Procedure(s) (LRB):  Bilateral L3-5 MBB (Bilateral)    Final Diagnosis: Lumbar spondylosis [M47.816]    Disposition: Home or self care    Patient Instructions:   Current Discharge Medication List        CONTINUE these medications which have NOT CHANGED    Details   ALPRAZolam (XANAX) 0.25 MG tablet Take 1 tablet (0.25 mg total) by mouth nightly as needed for Anxiety.  Qty: 30 tablet, Refills: 2      gabapentin (NEURONTIN) 300 MG capsule Take 1 capsule (300 mg total) by mouth every evening. For back and leg pain  Qty: 30 capsule, Refills: 11      betamethasone dipropionate (DIPROLENE) 0.05 % ointment Apply topically 2 (two) times daily. for 10 days  Qty: 45 g, Refills: 0      doxycycline (VIBRA-TABS) 100 MG tablet Take 1 tablet (100 mg total) by mouth 2 (two) times daily.  Qty: 20 tablet, Refills: 0      hydrocortisone 2.5 % cream APPLY  CREAM TO RASH TWICE DAILY  Qty: 28 g, Refills: 11    Comments: Please consider 90 day supplies to promote better adherence      meclizine (ANTIVERT) 25 mg tablet Take 1 tablet (25 mg total) by mouth 3 (three) times daily as needed.  Qty: 100 tablet, Refills: 11      ofloxacin (OCUFLOX) 0.3 % ophthalmic solution       PROLENSA 0.07 % Drop SMARTSIG:In Eye(s)                 Discharge Diagnosis: Lumbar spondylosis [M47.816]  Condition on Discharge: Stable with no complications to procedure   Diet on Discharge: Same as before.  Activity: as per instruction sheet.  Discharge to: Home with a responsible adult.  Follow up: 2-4 weeks       Please call the office at [phone] if you experience any weakness or loss of sensation, fever > 101.5, pain uncontrolled with oral medications, persistent nausea/vomiting/or diarrhea, redness or drainage from the incisions, or any other worrisome  concerns. If physician on call was not reached or could not communicate with our office for any reason please go to the nearest emergency department

## 2023-01-09 NOTE — DISCHARGE INSTRUCTIONS

## 2023-01-09 NOTE — OP NOTE
LUMBAR Medial Branch Block Under Fluoroscopy    INFORMED CONSENT: The procedure, risks, benefits and options were discussed with patient. There are no contraindications to the procedure. The patient expressed understanding and agreed to proceed. The personnel performing the procedure was discussed.    Date of procedure 01/09/2023    Time-out taken to identify patient and procedure side prior to starting the procedure.                                                                PROCEDURE:  Bilateral medial branch block at the transverse processes at the level of L4, L5 and sacral ala    Pre Procedure diagnosis:    Lumbar spondylosis [M47.816]  1. Lumbar spondylosis        Post-Procedure diagnosis:   same    PHYSICIAN: Salinas Jennings MD  ASSISTANTS: None    MEDICATIONS INJECTED: 0.5% bupivicane, 1mL at each level    LOCAL ANESTHETIC USED: Lidocaine, 1%, 1ml at each level    ESTIMATED BLOOD LOSS:  None.    COMPLICATIONS:  None.    Sedation: Conscious sedation provided by M.D    SEDATION MEDICATIONS: local/IV sedation: Versed 2 mg and fentanyl 50 mcg IV.  Conscious sedation ordered by MD.  Patient reevaluated and sedation administered by MD and monitored by RN.  Total sedation time was less than 10 min.    Total sedation time was <10 min      TECHNIQUE: Laying in a prone position, the patient was prepped and draped in the usual sterile fashion using ChloraPrep and fenestrated drape.  The level was determined under fluoroscopic guidance.  Local anesthetic was given by going down to the hub of the 27-gauge 1.25in needle and raising a wheel.  A 25-gauge 3.5inch needle was introduced to the anatomic local of the medial branch at each of the above levels using fluoroscopy in the AP, oblique, and lateral views.  After negative aspiration, medication was injected slowly. The patient tolerated the procedure well.       The patient was monitored after the procedure.  Patient was given post procedure and discharge  instructions to follow at home.  We will see the patient back in two weeks or the patient may call to inform of status. The patient was discharged in a stable condition

## 2023-01-10 ENCOUNTER — TELEPHONE (OUTPATIENT)
Dept: PAIN MEDICINE | Facility: CLINIC | Age: 63
End: 2023-01-10
Payer: COMMERCIAL

## 2023-01-10 DIAGNOSIS — M47.816 LUMBAR SPONDYLOSIS: Primary | ICD-10-CM

## 2023-01-10 NOTE — TELEPHONE ENCOUNTER
Patient reports 90% relief and lower back pain for 9 hours following bilateral L3-L5 medial branch block on 01/09/2023.  This is her 2nd positive medial branch block.  We will proceed with radiofrequency ablation

## 2023-01-11 DIAGNOSIS — M47.816 LUMBAR SPONDYLOSIS: Primary | ICD-10-CM

## 2023-01-13 ENCOUNTER — PATIENT MESSAGE (OUTPATIENT)
Dept: PAIN MEDICINE | Facility: CLINIC | Age: 63
End: 2023-01-13
Payer: COMMERCIAL

## 2023-01-17 ENCOUNTER — PATIENT MESSAGE (OUTPATIENT)
Dept: ADMINISTRATIVE | Facility: HOSPITAL | Age: 63
End: 2023-01-17
Payer: COMMERCIAL

## 2023-01-26 ENCOUNTER — TELEPHONE (OUTPATIENT)
Dept: PAIN MEDICINE | Facility: CLINIC | Age: 63
End: 2023-01-26
Payer: COMMERCIAL

## 2023-01-26 NOTE — TELEPHONE ENCOUNTER
Our procedure  will give you a call within 24-72 hours to get you schedule for your procedure. Please answer any numbers that come through and reach out to our staff if you have any further questions or concerns.

## 2023-01-26 NOTE — TELEPHONE ENCOUNTER
----- Message from Regina Hannah sent at 1/26/2023 10:09 AM CST -----  Contact: pt  Pt is calling in regard to not getting a call for her procedure gigi for Monday 1/30.  Pt is asking for the nurse to call her and she has questions.  Please call her back at [phone]

## 2023-01-26 NOTE — PRE-PROCEDURE INSTRUCTIONS
Attempted to PAT patient for procedure on 1.30 with Dr. bolton No answer. M with return phone number. No return call at this time.

## 2023-01-27 NOTE — PRE-PROCEDURE INSTRUCTIONS
Spoke with patient regarding procedure scheduled on 1.30    Arrival time 1030    Has patient been sick with fever or on antibiotics within the last 7 days? No    Does the patient have any open wounds, sores or rashes? No    Does the patient have any recent fractures? no    Has patient received a vaccination within the last 7 days? No    Received the COVID vaccination? yes    Has the patient stopped all medications as directed? NA    Does patient have a pacemaker and or defibrillator? no    Does the patient have a ride to and from procedure and someone reliable to remain with patient? Friend     Is the patient diabetic? no    Does the patient have sleep apnea? Or use O2 at home? No and no     Is the patient receiving sedation? yes    Is the patient instructed to remain NPO beginning at midnight the night before their procedure? yes    Procedure location confirmed with patient? Yes    Covid- Denies signs/symptoms. Instructed to notify PAT/MD if any changes.

## 2023-01-30 ENCOUNTER — HOSPITAL ENCOUNTER (OUTPATIENT)
Facility: HOSPITAL | Age: 63
Discharge: HOME | End: 2023-01-30
Attending: ANESTHESIOLOGY | Admitting: ANESTHESIOLOGY
Payer: COMMERCIAL

## 2023-01-30 VITALS
BODY MASS INDEX: 37.72 KG/M2 | TEMPERATURE: 97 F | OXYGEN SATURATION: 96 % | SYSTOLIC BLOOD PRESSURE: 135 MMHG | DIASTOLIC BLOOD PRESSURE: 63 MMHG | RESPIRATION RATE: 17 BRPM | WEIGHT: 240.31 LBS | HEIGHT: 67 IN | HEART RATE: 74 BPM

## 2023-01-30 DIAGNOSIS — M47.816 LUMBAR SPONDYLOSIS: Primary | ICD-10-CM

## 2023-01-30 PROCEDURE — 63600175 PHARM REV CODE 636 W HCPCS: Performed by: ANESTHESIOLOGY

## 2023-01-30 PROCEDURE — 64636 DESTROY L/S FACET JNT ADDL: CPT | Mod: 50 | Performed by: ANESTHESIOLOGY

## 2023-01-30 PROCEDURE — 99152 MOD SED SAME PHYS/QHP 5/>YRS: CPT | Performed by: ANESTHESIOLOGY

## 2023-01-30 PROCEDURE — 64635 DESTROY LUMB/SAC FACET JNT: CPT | Mod: 50 | Performed by: ANESTHESIOLOGY

## 2023-01-30 PROCEDURE — 64635 PR DESTROY LUMB/SAC FACET JNT: ICD-10-PCS | Mod: 50,,, | Performed by: ANESTHESIOLOGY

## 2023-01-30 PROCEDURE — 64635 DESTROY LUMB/SAC FACET JNT: CPT | Mod: 50,,, | Performed by: ANESTHESIOLOGY

## 2023-01-30 PROCEDURE — 64636 DESTROY L/S FACET JNT ADDL: CPT | Mod: 50,,, | Performed by: ANESTHESIOLOGY

## 2023-01-30 PROCEDURE — 25000003 PHARM REV CODE 250: Performed by: ANESTHESIOLOGY

## 2023-01-30 PROCEDURE — 64636 PR DESTROY L/S FACET JNT ADDL: ICD-10-PCS | Mod: 50,,, | Performed by: ANESTHESIOLOGY

## 2023-01-30 RX ORDER — BUPIVACAINE HYDROCHLORIDE 2.5 MG/ML
INJECTION, SOLUTION EPIDURAL; INFILTRATION; INTRACAUDAL
Status: DISCONTINUED | OUTPATIENT
Start: 2023-01-30 | End: 2023-01-30 | Stop reason: HOSPADM

## 2023-01-30 RX ORDER — LIDOCAINE HYDROCHLORIDE 10 MG/ML
INJECTION, SOLUTION EPIDURAL; INFILTRATION; INTRACAUDAL; PERINEURAL
Status: DISCONTINUED | OUTPATIENT
Start: 2023-01-30 | End: 2023-01-30 | Stop reason: HOSPADM

## 2023-01-30 RX ORDER — METHYLPREDNISOLONE ACETATE 40 MG/ML
INJECTION, SUSPENSION INTRA-ARTICULAR; INTRALESIONAL; INTRAMUSCULAR; SOFT TISSUE
Status: DISCONTINUED | OUTPATIENT
Start: 2023-01-30 | End: 2023-01-30 | Stop reason: HOSPADM

## 2023-01-30 RX ORDER — FENTANYL CITRATE 50 UG/ML
INJECTION, SOLUTION INTRAMUSCULAR; INTRAVENOUS
Status: DISCONTINUED | OUTPATIENT
Start: 2023-01-30 | End: 2023-01-30 | Stop reason: HOSPADM

## 2023-01-30 RX ORDER — MIDAZOLAM HYDROCHLORIDE 1 MG/ML
INJECTION, SOLUTION INTRAMUSCULAR; INTRAVENOUS
Status: DISCONTINUED | OUTPATIENT
Start: 2023-01-30 | End: 2023-01-30 | Stop reason: HOSPADM

## 2023-01-30 RX ORDER — ONDANSETRON 2 MG/ML
4 INJECTION INTRAMUSCULAR; INTRAVENOUS ONCE AS NEEDED
Status: DISCONTINUED | OUTPATIENT
Start: 2023-01-30 | End: 2023-01-30 | Stop reason: HOSPADM

## 2023-01-30 NOTE — OP NOTE
Lumbar Medial nerve branch block radiofrequency ablation Under Fluoroscopy     INFORMED CONSENT: The procedure, risks, benefits and options were discussed with patient. There are no contraindications to the procedure. The patient expressed understanding and agreed to proceed. The personnel performing the procedure was discussed.    Date of procedure 01/30/2023    Time-out taken to identify patient and procedure side prior to starting the procedure.                     PROCEDURE: Bilateral radiofrequency ablation of the the medial branch nerves at the   transverse process of  L4, L5, and the sacral ala    Pre Procedure diagnosis:    Lumbar spondylosis [M47.816]  1. Lumbar spondylosis        Post-Procedure diagnosis:   same      PHYSICIAN: Salinas Jennings MD    ASSISTANTS: None     LOCAL ANESTHETIC USED: 1ml of Lidocaine 1%, at each level    Sedation: Conscious sedation provided by M.D    SEDATION MEDICATIONS: local/IV sedation: Versed 2 mg and fentanyl 100 mcg IV.  Conscious sedation ordered by MD.  Patient reevaluated and sedation administered by MD and monitored by RN.  Total sedation time was less than 25 min.    Total sedation time was >20 min but <30min    ESTIMATED BLOOD LOSS: None.     COMPLICATIONS: None.       TECHNIQUE: Laying in a prone position, the patient was prepped and draped in the usual sterile fashion using ChloraPrep and fenestrated drape. The level was determined under fluoroscopic guidance. Local anesthetic was given by going down to the hub of the 27-gauge 1.25in needle and raising a wheel. A 18-gauge 10mm curved active tip needle was introduced to the anatomic local of the medial branch at each of the above levels using fluoroscopy. Then sensory and motor testing was performed to confirm that the needle tips were in the correct location. Then after negative aspiration, 1ml of lidocaine PF 1% was injected at each level. This was followed by thermal lesioning at 80 degrees celsius for 90  seconds. After lesioning, 0.5ml of bupivacaine PF 0.25% and 5mg of DepoMedrol was injected at each level.    The patient tolerated the procedure well. Did not have any procedure related motor deficit at the conclusion of the procedure      The patient was monitored for approximately 30 minutes after the procedure.  Patient was given post procedure and discharge instructions to follow at home.  The patient was discharged in a stable condition

## 2023-01-30 NOTE — DISCHARGE INSTRUCTIONS

## 2023-01-30 NOTE — H&P
HPI  Patient presenting for Procedure(s) (LRB):  Bilateral L3-5 Lumbar RFA (Bilateral)     Patient on Anti-coagulation No    No health changes since previous encounter    No past medical history on file.  Past Surgical History:   Procedure Laterality Date    BREAST BIOPSY Left     negative 2015 negative    INJECTION OF ANESTHETIC AGENT AROUND MEDIAL BRANCH NERVES INNERVATING LUMBAR FACET JOINT Bilateral 12/19/2022    Procedure: Bilateral L3-5 MBB;  Surgeon: Salinas Jennings MD;  [facility]: [facility];  Service: Pain Management;  Laterality: Bilateral;    INJECTION OF ANESTHETIC AGENT AROUND MEDIAL BRANCH NERVES INNERVATING LUMBAR FACET JOINT Bilateral 1/9/2023    Procedure: Bilateral L3-5 MBB;  Surgeon: Salinas Jennings MD;  Location: [facility];  Service: Pain Management;  Laterality: Bilateral;    SELECTIVE INJECTION OF ANESTHETIC AGENT AROUND LUMBAR SPINAL NERVE ROOT BY TRANSFORAMINAL APPROACH Bilateral 11/9/2022    Procedure: Bilateral L5/S1 TF LAUREN;  Surgeon: Salinas Jennings MD;  Location: [facility];  Service: Pain Management;  Laterality: Bilateral;     Review of patient's allergies indicates:   Allergen Reactions    Erythromycin     Pcn [penicillins]         No current facility-administered medications on file prior to encounter.     Current Outpatient Medications on File Prior to Encounter   Medication Sig Dispense Refill    ALPRAZolam (XANAX) 0.25 MG tablet Take 1 tablet (0.25 mg total) by mouth nightly as needed for Anxiety. 30 tablet 2    betamethasone dipropionate (DIPROLENE) 0.05 % ointment Apply topically 2 (two) times daily. for 10 days 45 g 0    doxycycline (VIBRA-TABS) 100 MG tablet Take 1 tablet (100 mg total) by mouth 2 (two) times daily. 20 tablet 0    gabapentin (NEURONTIN) 300 MG capsule Take 1 capsule (300 mg total) by mouth every evening. For back and leg pain 30 capsule 11    hydrocortisone 2.5 % cream APPLY  CREAM TO RASH TWICE DAILY 28 g 11    meclizine (ANTIVERT) 25 mg tablet  "Take 1 tablet (25 mg total) by mouth 3 (three) times daily as needed. 100 tablet 11    ofloxacin (OCUFLOX) 0.3 % ophthalmic solution       PROLENSA 0.07 % Drop SMARTSIG:In Eye(s)          PMHx, PSHx, Allergies, Medications reviewed in epic    ROS negative except pain complaints in HPI    OBJECTIVE:    /86 (BP Location: Right arm, Patient Position: Sitting)   Pulse 82   Temp 97.1 °F (36.2 °C) (Skin)   Resp 18   Ht 5' 7" (1.702 m)   Wt 109 kg (240 lb 4.8 oz)   SpO2 99%   Breastfeeding No   BMI 37.64 kg/m²     PHYSICAL EXAMINATION:    GENERAL: Well appearing, in no acute distress, alert and oriented x3.  PSYCH:  Mood and affect appropriate.  SKIN: Skin color, texture, turgor normal, no rashes or lesions which will impact the procedure.  CV: RRR with palpation of the radial artery.  PULM: No evidence of respiratory difficulty, symmetric chest rise. Clear to auscultation.  NEURO: Cranial nerves grossly intact.    Plan:    Proceed with procedure as planned Procedure(s) (LRB):  Bilateral L3-5 Lumbar RFA (Bilateral)    Salinas Jennings MD  01/30/2023            "

## 2023-01-30 NOTE — DISCHARGE SUMMARY
Discharge Note  Short Stay      SUMMARY     Admit Date: 1/30/2023    Attending Physician: Salinas Jennings MD        Discharge Physician: Salinas Jennings MD        Discharge Date: 1/30/2023 11:51 AM    Procedure(s) (LRB):  Bilateral L3-5 Lumbar RFA (Bilateral)    Final Diagnosis: Lumbar spondylosis [M47.816]    Disposition: Home or self care    Patient Instructions:   Current Discharge Medication List        CONTINUE these medications which have NOT CHANGED    Details   ALPRAZolam (XANAX) 0.25 MG tablet Take 1 tablet (0.25 mg total) by mouth nightly as needed for Anxiety.  Qty: 30 tablet, Refills: 2      betamethasone dipropionate (DIPROLENE) 0.05 % ointment Apply topically 2 (two) times daily. for 10 days  Qty: 45 g, Refills: 0      doxycycline (VIBRA-TABS) 100 MG tablet Take 1 tablet (100 mg total) by mouth 2 (two) times daily.  Qty: 20 tablet, Refills: 0      gabapentin (NEURONTIN) 300 MG capsule Take 1 capsule (300 mg total) by mouth every evening. For back and leg pain  Qty: 30 capsule, Refills: 11      hydrocortisone 2.5 % cream APPLY  CREAM TO RASH TWICE DAILY  Qty: 28 g, Refills: 11    Comments: Please consider 90 day supplies to promote better adherence      meclizine (ANTIVERT) 25 mg tablet Take 1 tablet (25 mg total) by mouth 3 (three) times daily as needed.  Qty: 100 tablet, Refills: 11      ofloxacin (OCUFLOX) 0.3 % ophthalmic solution       PROLENSA 0.07 % Drop SMARTSIG:In Eye(s)                 Discharge Diagnosis: Lumbar spondylosis [M47.816]  Condition on Discharge: Stable with no complications to procedure   Diet on Discharge: Same as before.  Activity: as per instruction sheet.  Discharge to: Home with a responsible adult.  Follow up: 2-4 weeks       Please call the office at [phone] if you experience any weakness or loss of sensation, fever > 101.5, pain uncontrolled with oral medications, persistent nausea/vomiting/or diarrhea, redness or drainage from the incisions, or any other  worrisome concerns. If physician on call was not reached or could not communicate with our office for any reason please go to the nearest emergency department

## 2023-02-03 ENCOUNTER — TELEPHONE (OUTPATIENT)
Dept: FAMILY MEDICINE | Facility: CLINIC | Age: 63
End: 2023-02-03
Payer: COMMERCIAL

## 2023-02-03 DIAGNOSIS — Z12.31 SCREENING MAMMOGRAM FOR BREAST CANCER: Primary | ICD-10-CM

## 2023-02-03 NOTE — TELEPHONE ENCOUNTER
----- Message from Tita Jaime sent at 2/3/2023  2:09 PM CST -----  Contact: [phone] Patient  Caller is requesting to schedule their annual screening mammogram appointment. Order is not listed in Epic.  Please enter order and contact patient to schedule.  Would the patient like a call back, or a response through their [facility] portal?:   pt portal/pt is requesting to schedule at [facility] on Any date 3/1/2023 or later-Wednesday morning.

## 2023-02-28 ENCOUNTER — OFFICE VISIT (OUTPATIENT)
Dept: PAIN MEDICINE | Facility: CLINIC | Age: 63
End: 2023-02-28
Payer: COMMERCIAL

## 2023-02-28 VITALS
HEART RATE: 89 BPM | RESPIRATION RATE: 17 BRPM | WEIGHT: 242.5 LBS | DIASTOLIC BLOOD PRESSURE: 79 MMHG | SYSTOLIC BLOOD PRESSURE: 118 MMHG | BODY MASS INDEX: 38.06 KG/M2 | HEIGHT: 67 IN

## 2023-02-28 DIAGNOSIS — M47.816 LUMBAR SPONDYLOSIS: ICD-10-CM

## 2023-02-28 DIAGNOSIS — M43.16 SPONDYLOLISTHESIS OF LUMBAR REGION: Primary | ICD-10-CM

## 2023-02-28 DIAGNOSIS — M54.16 LUMBAR RADICULOPATHY: ICD-10-CM

## 2023-02-28 DIAGNOSIS — M51.36 DDD (DEGENERATIVE DISC DISEASE), LUMBAR: ICD-10-CM

## 2023-02-28 PROCEDURE — 1159F PR MEDICATION LIST DOCUMENTED IN MEDICAL RECORD: ICD-10-PCS | Mod: CPTII,S$GLB,, | Performed by: ANESTHESIOLOGY

## 2023-02-28 PROCEDURE — 1159F MED LIST DOCD IN RCRD: CPT | Mod: CPTII,S$GLB,, | Performed by: ANESTHESIOLOGY

## 2023-02-28 PROCEDURE — 3074F PR MOST RECENT SYSTOLIC BLOOD PRESSURE < 130 MM HG: ICD-10-PCS | Mod: CPTII,S$GLB,, | Performed by: ANESTHESIOLOGY

## 2023-02-28 PROCEDURE — 99999 PR PBB SHADOW E&M-EST. PATIENT-LVL III: ICD-10-PCS | Mod: PBBFAC,,, | Performed by: ANESTHESIOLOGY

## 2023-02-28 PROCEDURE — 3008F PR BODY MASS INDEX (BMI) DOCUMENTED: ICD-10-PCS | Mod: CPTII,S$GLB,, | Performed by: ANESTHESIOLOGY

## 2023-02-28 PROCEDURE — 3078F DIAST BP <80 MM HG: CPT | Mod: CPTII,S$GLB,, | Performed by: ANESTHESIOLOGY

## 2023-02-28 PROCEDURE — 3074F SYST BP LT 130 MM HG: CPT | Mod: CPTII,S$GLB,, | Performed by: ANESTHESIOLOGY

## 2023-02-28 PROCEDURE — 3008F BODY MASS INDEX DOCD: CPT | Mod: CPTII,S$GLB,, | Performed by: ANESTHESIOLOGY

## 2023-02-28 PROCEDURE — 99999 PR PBB SHADOW E&M-EST. PATIENT-LVL III: CPT | Mod: PBBFAC,,, | Performed by: ANESTHESIOLOGY

## 2023-02-28 PROCEDURE — 99213 PR OFFICE/OUTPT VISIT, EST, LEVL III, 20-29 MIN: ICD-10-PCS | Mod: S$GLB,,, | Performed by: ANESTHESIOLOGY

## 2023-02-28 PROCEDURE — 99213 OFFICE O/P EST LOW 20 MIN: CPT | Mod: S$GLB,,, | Performed by: ANESTHESIOLOGY

## 2023-02-28 PROCEDURE — 3078F PR MOST RECENT DIASTOLIC BLOOD PRESSURE < 80 MM HG: ICD-10-PCS | Mod: CPTII,S$GLB,, | Performed by: ANESTHESIOLOGY

## 2023-02-28 NOTE — PROGRESS NOTES
Interventional Pain Progress Note       Referring Physician: No ref. provider found    PCP: Rayray Mann MD    Chief Complaint:   Low Back Pain      Interval History (02/28/2023):  Patient returns to clinic after procedure.  Patient reports 10% relief and lower back pain after bilateral L3-L5 lumbar RFA on 01/30/2023.  Primary pain continues to be axial stabbing pain across the lower back area.  Patient reports minimal radiation to her right lower extremity, however her primary day-to-day pain is axial in nature.  Pain is worse with prolonged standing or walking, better with sitting down rest.  Pain is rated a 2/10 at rest, and they 9/10 after significant activity. Denies any fevers, chills, changes in gait, weakness, or bowel and bladder incontinence        Interval History (12/6/22):  Patient returns to clinic after procedure.  Patient reports resolution of bilateral lower extremity pain but no change in axial lower back pain after bilateral L5-S1 transforaminal epidural steroid injection on 11/09/2022.  Pain is described as an aching stabbing pain in a band across her lower back.  Pain is worse with extension and rotation, better with flexion and heat.  Pain is rated a 7/10. Denies any fevers, chills, changes in gait, weakness, or bowel and bladder incontinence         SUBJECTIVE:    Kentrell Aguilera is a 62 y.o. female who presents to the clinic for the evaluation of lower back pain. The pain started 6 months ago and symptoms have been worsening.  Pain is described as a sharp stabbing pain that starts in a band across her lower back and radiates down her bilateral lower extremities to the ankles.  Pain is worse with flexion, extension, and standing, better with ice.  Pain is rated 10/10. Denies any fevers, chills, changes in gait, weakness, or bowel and bladder incontinence      Non-Pharmacologic Treatments:  Physical Therapy/Home Exercise: yes  Ice/Heat:yes  TENS: no  Acupuncture: no  Massage:  no  Chiropractic: no        Previous Pain Medications:  NSAIDs, Tylenol, neuropathic, topicals     report:  Reviewed and consistent with medication use as prescribed.    Pain Procedures:   -01/30/2023:  Bilateral L3-L5 lumbar RFA, 10% relief  -11/09/2022:  Bilateral L5-S1 transforaminal epidural steroid injection, resolution of bilateral lower extremity pain, no relief in axial low back pain      Imaging:     Results for orders placed during the hospital encounter of 10/24/22    MRI Lumbar Spine Without Contrast    Narrative  EXAMINATION:  MRI LUMBAR SPINE WITHOUT CONTRAST    CLINICAL HISTORY:  Low back pain, symptoms persist with > 6wks conservative treatment; Dorsalgia, unspecified    TECHNIQUE:  Multiplanar, multisequence MR images were acquired from the thoracolumbar junction to the sacrum without the administration of contrast.    COMPARISON:  X-ray dated 05/13/2021    FINDINGS:  There are 5 non-rib-bearing lumbar vertebrae.  There is mild grade 1 anterolisthesis of L4 on L5.  Alignment is otherwise unremarkable.  There is no acute fracture or compression deformity.  No aggressive focal signal abnormality.  No pars interarticularis defect.  Mild degenerative endplate changes noted including Modic type 1 edema at the anterior inferior L2 endplate and anterior superior L5 endplate.    Conus medullaris terminates at the T12-L1 level.  Conus medullaris is normal in size and signal.    T12-L1: No significant disc pathology.  No significant spinal canal or neural foraminal stenosis.    L1-L2: No significant disc pathology.  No significant spinal canal or neural foraminal stenosis.    L2-L3: Mild disc desiccation and small circumferential disc bulge with superimposed small central protrusion.  Mild bilateral facet arthropathy.  No significant spinal canal or neural foraminal stenosis.    L3-L4: No significant disc pathology.  Mild bilateral facet arthropathy.  No significant spinal canal or neural foraminal  stenosis.    L4-L5: Mild disc desiccation.  Bilateral facet arthropathy.  No significant spinal canal or neural foraminal stenosis.    L5-S1: Mild disc desiccation and asymmetric to the left small disc bulge.  Mild bilateral facet arthropathy.  No significant spinal canal stenosis.  Minimal left neural foraminal stenosis.    Paraspinal soft tissues are unremarkable.  Simple right renal cysts noted.  Visualized intra-abdominopelvic contents are otherwise unremarkable.    Impression  Multilevel degenerative changes as detailed above including grade 1 anterolisthesis of L4 on L5 as well as mild Modic type 1 edema at the L2 inferior endplate and L5 superior endplate.    Small disc bulge with superimposed small central protrusion at L2-L3.  No resulting spinal canal stenosis.    Minimal left neural foraminal stenosis at L5-S1.  No other significant spinal canal or neural foraminal stenosis.      Electronically signed by: Jaison Avelar  Date:    10/24/2022  Time:    08:06      Results for orders placed during the hospital encounter of 05/13/21    X-Ray Lumbar Spine 5 View    Narrative  EXAMINATION:  XR LUMBAR SPINE COMPLETE 5 VIEW    CLINICAL HISTORY:  Dorsalgia, unspecifiedBack pain, progressive neurologic deficit;    COMPARISON:  None    FINDINGS:  Five views of the lumbar spine.    Moderate facet arthropathy L4/5 and L5/S1.  Minimal grade 1 anterolisthesis L4/5 with mild disc space narrowing.  Overall alignment is well maintained.  No evidence of spondylolysis. Vertebral body heights are normal.    Mild constipation    Impression  See above.      Electronically signed by: Jean Sahu MD  Date:    05/13/2021  Time:    08:10      History reviewed. No pertinent past medical history.  Past Surgical History:   Procedure Laterality Date    BREAST BIOPSY Left     negative 2015 negative    INJECTION OF ANESTHETIC AGENT AROUND MEDIAL BRANCH NERVES INNERVATING LUMBAR FACET JOINT Bilateral 12/19/2022    Procedure: Bilateral  L3-5 MBB;  Surgeon: Salinas Jennings MD;  Location: [facility];  Service: Pain Management;  Laterality: Bilateral;    INJECTION OF ANESTHETIC AGENT AROUND MEDIAL BRANCH NERVES INNERVATING LUMBAR FACET JOINT Bilateral 2023    Procedure: Bilateral L3-5 MBB;  Surgeon: Salinas Jennings MD;  Location: [facility];  Service: Pain Management;  Laterality: Bilateral;    RADIOFREQUENCY THERMOCOAGULATION Bilateral 2023    Procedure: Bilateral L3-5 Lumbar RFA;  Surgeon: Salinas Jennings MD;  Location: [facility];  Service: Pain Management;  Laterality: Bilateral;    SELECTIVE INJECTION OF ANESTHETIC AGENT AROUND LUMBAR SPINAL NERVE ROOT BY TRANSFORAMINAL APPROACH Bilateral 2022    Procedure: Bilateral L5/S1 TF LAUREN;  Surgeon: Salinas Jennings MD;  Location: [facility];  Service: Pain Management;  Laterality: Bilateral;     Social History     Socioeconomic History    Marital status: Single   Occupational History     Comment: not [employer]   Tobacco Use    Smoking status: Former     Packs/day: 0.50     Types: Cigarettes     Quit date:      Years since quittin.1    Smokeless tobacco: Never   Substance and Sexual Activity    Alcohol use: Yes     Family History   Problem Relation Age of Onset    COPD Mother     Cancer Mother         lung    Coronary artery disease Father     Diabetes Father     Hypertension Sister     No Known Problems Brother     COPD Sister        Review of patient's allergies indicates:   Allergen Reactions    Erythromycin     Pcn [penicillins]        Current Outpatient Medications   Medication Sig    doxycycline (VIBRA-TABS) 100 MG tablet Take 1 tablet (100 mg total) by mouth 2 (two) times daily.    gabapentin (NEURONTIN) 300 MG capsule Take 1 capsule (300 mg total) by mouth every evening. For back and leg pain    hydrocortisone 2.5 % cream APPLY  CREAM TO RASH TWICE DAILY    meclizine (ANTIVERT) 25 mg tablet Take 1 tablet (25 mg total) by mouth 3 (three) times daily  "as needed.    ofloxacin (OCUFLOX) 0.3 % ophthalmic solution     PROLENSA 0.07 % Drop SMARTSIG:In Eye(s)    ALPRAZolam (XANAX) 0.25 MG tablet Take 1 tablet (0.25 mg total) by mouth nightly as needed for Anxiety.    betamethasone dipropionate (DIPROLENE) 0.05 % ointment Apply topically 2 (two) times daily. for 10 days     No current facility-administered medications for this visit.         ROS  Review of Systems   Constitutional:  Negative for chills, diaphoresis, fatigue and fever.   Respiratory:  Negative for chest tightness, shortness of breath, wheezing and stridor.    Cardiovascular:  Negative for chest pain and leg swelling.   Gastrointestinal:  Negative for blood in stool, diarrhea, nausea and vomiting.   Endocrine: Negative for cold intolerance and heat intolerance.   Genitourinary:  Negative for dysuria, hematuria and urgency.   Musculoskeletal:  Positive for arthralgias, back pain, joint swelling and myalgias. Negative for gait problem, neck pain and neck stiffness.   Skin:  Negative for rash.   Neurological:  Negative for tremors, seizures, speech difficulty, weakness, light-headedness, numbness and headaches.   Hematological:  Does not bruise/bleed easily.   Psychiatric/Behavioral:  Negative for agitation, confusion and suicidal ideas. The patient is not nervous/anxious.           OBJECTIVE:  /79   Pulse 89   Resp 17   Ht 5' 7" (1.702 m)   Wt 110 kg (242 lb 8.1 oz)   BMI 37.98 kg/m²         Physical Exam  Constitutional:       General: She is not in acute distress.     Appearance: Normal appearance. She is not ill-appearing.   HENT:      Head: Normocephalic and atraumatic.      Nose: No congestion or rhinorrhea.      Mouth/Throat:      Mouth: Mucous membranes are moist.   Eyes:      Extraocular Movements: Extraocular movements intact.      Pupils: Pupils are equal, round, and reactive to light.   Cardiovascular:      Pulses: Normal pulses.   Pulmonary:      Effort: Pulmonary effort is normal. "   Abdominal:      General: Abdomen is flat.      Palpations: Abdomen is soft.   Musculoskeletal:      Cervical back: Normal range of motion and neck supple.   Skin:     General: Skin is warm and dry.      Capillary Refill: Capillary refill takes less than 2 seconds.   Neurological:      Mental Status: She is alert and oriented to person, place, and time.      Cranial Nerves: No cranial nerve deficit.      Sensory: No sensory deficit.      Motor: No weakness or abnormal muscle tone.      Deep Tendon Reflexes:      Reflex Scores:       Patellar reflexes are 1+ on the right side and 1+ on the left side.       Achilles reflexes are 1+ on the right side and 1+ on the left side.     Comments:      Psychiatric:         Mood and Affect: Mood normal.         Behavior: Behavior normal.         Thought Content: Thought content normal.         Musculoskeletal:      Lumbar Exam  Incision: no  Pain with Flexion: yes  Pain with Extension: yes  Paraspinous TTP:  Bilateral lumbar paraspinous  Facet TTP:  L5-S1  Facet Loading:  Positive bilaterally  SLR:  Negative bilaterally  SIJ TTP:  Positive on the right  SAULO:  Negative bilaterally      LABS:  Lab Results   Component Value Date    WBC 6.56 08/15/2022    HGB 15.2 08/15/2022    HCT 45.2 08/15/2022    MCV 89 08/15/2022     08/15/2022       CMP  Sodium   Date Value Ref Range Status   08/15/2022 139 136 - 145 mmol/L Final     Potassium   Date Value Ref Range Status   08/15/2022 3.8 3.5 - 5.1 mmol/L Final     Chloride   Date Value Ref Range Status   08/15/2022 105 95 - 110 mmol/L Final     CO2   Date Value Ref Range Status   08/15/2022 27 23 - 29 mmol/L Final     Glucose   Date Value Ref Range Status   08/15/2022 106 70 - 110 mg/dL Final     BUN   Date Value Ref Range Status   08/15/2022 15 7 - 17 mg/dL Final     Creatinine   Date Value Ref Range Status   08/15/2022 0.66 0.50 - 1.40 mg/dL Final     Calcium   Date Value Ref Range Status   08/15/2022 8.8 8.7 - 10.5 mg/dL Final      Total Protein   Date Value Ref Range Status   08/15/2022 7.0 6.0 - 8.4 g/dL Final     Albumin   Date Value Ref Range Status   08/15/2022 4.1 3.5 - 5.2 g/dL Final     Total Bilirubin   Date Value Ref Range Status   08/15/2022 0.6 0.1 - 1.0 mg/dL Final     Comment:     For infants and newborns, interpretation of results should be based  on gestational age, weight and in agreement with clinical  observations.    Premature Infant recommended reference ranges:  Up to 24 hours.............<8.0 mg/dL  Up to 48 hours............<12.0 mg/dL  3-5 days..................<15.0 mg/dL  6-29 days.................<15.0 mg/dL       Alkaline Phosphatase   Date Value Ref Range Status   08/15/2022 102 38 - 126 U/L Final     AST   Date Value Ref Range Status   08/15/2022 27 15 - 46 U/L Final     ALT   Date Value Ref Range Status   08/15/2022 29 10 - 44 U/L Final     Anion Gap   Date Value Ref Range Status   08/15/2022 7 (L) 8 - 16 mmol/L Final     eGFR if    Date Value Ref Range Status   12/29/2020 >60.0 >60 mL/min/1.73 m^2 Final     eGFR if non    Date Value Ref Range Status   12/29/2020 >60.0 >60 mL/min/1.73 m^2 Final     Comment:     Calculation used to obtain the estimated glomerular filtration  rate (eGFR) is the CKD-EPI equation.          No results found for: LABA1C, HGBA1C          ASSESSMENT:       62 y.o. year old female with lower back pain, consistent with     1. Spondylolisthesis of lumbar region  Back/Cervical Brace For Home Use      2. Lumbar spondylosis        3. Lumbar radiculopathy        4. DDD (degenerative disc disease), lumbar          Spondylolisthesis of lumbar region  -     Back/Cervical Brace For Home Use    Lumbar spondylosis    Lumbar radiculopathy    DDD (degenerative disc disease), lumbar           PLAN:   - Interventions:   -01/30/2023:  Bilateral L3-L5 lumbar RFA, 10% relief  -11/09/2022:  Bilateral L5-S1 transforaminal epidural steroid injection, resolution of bilateral  lower extremity pain, no relief in axial low back pain  - Anticoagulation use:   no no anticoagulation    - Medications:   Continue gabapentin 300 mg at night    - Therapy:    Patient will refer for fitting of LSO brace for lower back pain with noted spondylolisthesis on MRI.    - Imaging/Diagnostic:   MRI lumbar spine reviewed.  Significant for bilateral foraminal stenosis at L5-S1, left greater than right, secondary to disc herniation.  There is noted grade 1 anterolisthesis of L4 upon L5 with facet arthropathy at L4-5 and L5-S1.    - Consults:   Discussed referral to Neurosurgery.  Patient defers at this time.        - Patient Questions: Answered all of the patient's questions regarding diagnosis, therapy, and treatment    - Follow up visit: return to clinic after procedure      The above plan and management options were discussed at length with patient. Patient is in agreement with the above and verbalized understanding.    I discussed the goals of interventional chronic pain management with the patient on today's visit.  I explained the utility of injections for diagnostic and therapeutic purposes.  We discussed a multimodal approach to pain including treating the patient's given worst pain at any given time.  We will use a systematic approach to addressing pain.  We will also adopt a multimodal approach that includes injections, adjuvant medications, physical therapy, at times psychiatry.  There may be a limited role for opioid use intermittently in the treatment of pain, more particularly for acute pain although no one approach can be used as a sole treatment modality.    I emphasized the importance of regular exercise, core strengthening and stretching, diet and weight loss as a cornerstone of long-term pain management.      Salinas Jennings MD  Interventional Pain Management  [facility]    Disclaimer:  This note was prepared using voice recognition system and is likely to have sound alike errors  that may have been overlooked even after proof reading.  Please call me with any questions

## 2023-03-01 ENCOUNTER — HOSPITAL ENCOUNTER (OUTPATIENT)
Dept: RADIOLOGY | Facility: HOSPITAL | Age: 63
Discharge: HOME | End: 2023-03-01
Attending: FAMILY MEDICINE
Payer: COMMERCIAL

## 2023-03-01 DIAGNOSIS — Z12.31 SCREENING MAMMOGRAM FOR BREAST CANCER: ICD-10-CM

## 2023-03-01 PROCEDURE — 77067 MAMMO DIGITAL SCREENING BILAT WITH TOMO: ICD-10-PCS | Mod: 26,,, | Performed by: RADIOLOGY

## 2023-03-01 PROCEDURE — 77063 MAMMO DIGITAL SCREENING BILAT WITH TOMO: ICD-10-PCS | Mod: 26,,, | Performed by: RADIOLOGY

## 2023-03-01 PROCEDURE — 77067 SCR MAMMO BI INCL CAD: CPT | Mod: TC,PO

## 2023-03-01 PROCEDURE — 77063 BREAST TOMOSYNTHESIS BI: CPT | Mod: 26,,, | Performed by: RADIOLOGY

## 2023-03-01 PROCEDURE — 77067 SCR MAMMO BI INCL CAD: CPT | Mod: 26,,, | Performed by: RADIOLOGY

## 2023-03-02 ENCOUNTER — PATIENT MESSAGE (OUTPATIENT)
Dept: PAIN MEDICINE | Facility: CLINIC | Age: 63
End: 2023-03-02
Payer: COMMERCIAL

## 2023-03-03 ENCOUNTER — PATIENT MESSAGE (OUTPATIENT)
Dept: PAIN MEDICINE | Facility: CLINIC | Age: 63
End: 2023-03-03
Payer: COMMERCIAL

## 2023-03-06 ENCOUNTER — TELEPHONE (OUTPATIENT)
Dept: NEUROSURGERY | Facility: CLINIC | Age: 63
End: 2023-03-06
Payer: COMMERCIAL

## 2023-03-06 ENCOUNTER — PATIENT MESSAGE (OUTPATIENT)
Dept: PAIN MEDICINE | Facility: CLINIC | Age: 63
End: 2023-03-06
Payer: COMMERCIAL

## 2023-03-06 DIAGNOSIS — M43.16 SPONDYLOLISTHESIS OF LUMBAR REGION: Primary | ICD-10-CM

## 2023-03-06 DIAGNOSIS — M51.36 DDD (DEGENERATIVE DISC DISEASE), LUMBAR: ICD-10-CM

## 2023-03-06 NOTE — TELEPHONE ENCOUNTER
Called Pt notified it take 2 week for insurance to authorize the back brace. Pt also would like to schedule with    Neurosurgery she delcined at last visit but would like to see them to discuss different options.

## 2023-03-06 NOTE — TELEPHONE ENCOUNTER
Clarified with Pt Neurosurgery is not the same as Neurology and they will also discuss other options with her before surgery. All questions answered.

## 2023-03-06 NOTE — TELEPHONE ENCOUNTER
Contacted patient regarding referral with Neurosurgery. Patient states no surgeries within last 2 years, nor was pain/injury caused by any accident that would be involved in any type of future litigation.    Aleena Anaya RN     Yes

## 2023-03-07 ENCOUNTER — PATIENT MESSAGE (OUTPATIENT)
Dept: NEUROSURGERY | Facility: CLINIC | Age: 63
End: 2023-03-07
Payer: COMMERCIAL

## 2023-03-09 ENCOUNTER — PATIENT MESSAGE (OUTPATIENT)
Dept: NEUROSURGERY | Facility: CLINIC | Age: 63
End: 2023-03-09

## 2023-03-09 ENCOUNTER — OFFICE VISIT (OUTPATIENT)
Dept: NEUROSURGERY | Facility: CLINIC | Age: 63
End: 2023-03-09
Payer: COMMERCIAL

## 2023-03-09 VITALS
SYSTOLIC BLOOD PRESSURE: 142 MMHG | HEART RATE: 85 BPM | HEIGHT: 67 IN | WEIGHT: 242 LBS | DIASTOLIC BLOOD PRESSURE: 77 MMHG | BODY MASS INDEX: 37.98 KG/M2

## 2023-03-09 DIAGNOSIS — M54.9 DORSALGIA, UNSPECIFIED: ICD-10-CM

## 2023-03-09 DIAGNOSIS — M51.36 DDD (DEGENERATIVE DISC DISEASE), LUMBAR: ICD-10-CM

## 2023-03-09 DIAGNOSIS — M43.16 SPONDYLOLISTHESIS OF LUMBAR REGION: Primary | ICD-10-CM

## 2023-03-09 PROCEDURE — 99203 OFFICE O/P NEW LOW 30 MIN: CPT | Mod: S$GLB,,, | Performed by: PHYSICIAN ASSISTANT

## 2023-03-09 PROCEDURE — 3077F PR MOST RECENT SYSTOLIC BLOOD PRESSURE >= 140 MM HG: ICD-10-PCS | Mod: CPTII,S$GLB,, | Performed by: PHYSICIAN ASSISTANT

## 2023-03-09 PROCEDURE — 3078F PR MOST RECENT DIASTOLIC BLOOD PRESSURE < 80 MM HG: ICD-10-PCS | Mod: CPTII,S$GLB,, | Performed by: PHYSICIAN ASSISTANT

## 2023-03-09 PROCEDURE — 3008F BODY MASS INDEX DOCD: CPT | Mod: CPTII,S$GLB,, | Performed by: PHYSICIAN ASSISTANT

## 2023-03-09 PROCEDURE — 99999 PR PBB SHADOW E&M-EST. PATIENT-LVL IV: ICD-10-PCS | Mod: PBBFAC,,, | Performed by: PHYSICIAN ASSISTANT

## 2023-03-09 PROCEDURE — 3078F DIAST BP <80 MM HG: CPT | Mod: CPTII,S$GLB,, | Performed by: PHYSICIAN ASSISTANT

## 2023-03-09 PROCEDURE — 99999 PR PBB SHADOW E&M-EST. PATIENT-LVL IV: CPT | Mod: PBBFAC,,, | Performed by: PHYSICIAN ASSISTANT

## 2023-03-09 PROCEDURE — 3008F PR BODY MASS INDEX (BMI) DOCUMENTED: ICD-10-PCS | Mod: CPTII,S$GLB,, | Performed by: PHYSICIAN ASSISTANT

## 2023-03-09 PROCEDURE — 3077F SYST BP >= 140 MM HG: CPT | Mod: CPTII,S$GLB,, | Performed by: PHYSICIAN ASSISTANT

## 2023-03-09 PROCEDURE — 99203 PR OFFICE/OUTPT VISIT, NEW, LEVL III, 30-44 MIN: ICD-10-PCS | Mod: S$GLB,,, | Performed by: PHYSICIAN ASSISTANT

## 2023-03-09 NOTE — PROGRESS NOTES
Subjective:      Patient ID: Kentrell Aguilera is a 63 y.o. female.    HPI  The patient is here today for evaluation of lumbar spine pain.  She is referred to our clinic by Dr. Salinas Jennings.  Pain has been present for over a year now.   Pt feels that her pain started with the knee (R knee). She's had two knee scopes.  Pain is localized to her lower back   Nothing make it better.  Feels like her back pain is affecting her walking.  Pain radiates to her right leg with tingling feeling.  Denies weakness in her legs.  Has some knee issues.   Pt states she was a  for years and believes the pain is from wear and tear.  Denies fall or MVA  Does not want to take the gabapentin or any other pill.  Denies los of BB function or SA.    Her walking is down to a minimum, feels like  PT would only make things worse.  Pt also has a history of vertigo.  Her main concern is lower back pain.  She does have leg pain at times and recently she had pain that affected her RLE.  Sitting is very uncomfortable.   Today she is having more pain on the L side but usually it's the R side.  She attended a christening a month ago and the following day could barely walk.  Over a year ago she did have a fall and fell onto her face, required sutures above her R eyebrow.     Patient is employed as an  for [employer] and runs a non-profit in the summer.    Pain Procedures:   -01/30/2023:  Bilateral L3-L5 lumbar RFA, 10% relief  -11/09/2022:  Bilateral L5-S1 transforaminal epidural steroid injection, resolution of bilateral lower extremity pain, no relief in axial low back pain     Objective:     Body mass index is 37.9 kg/m².  Vitals:    03/09/23 1307   BP: (!) 142/77   Pulse: 85        Back:  None  Paraspinal muscle spasms   None  Pain with flexion and extention   WNL  Range of motion    Neg  Straight leg raise     Motor   Right Right Left Left  Level Group   5  5  L2 Hip flexor (Psoas)   5  5  L3 Leg extension  (Quads)   5  5  L4 Dorsiflexion & foot inversion (Tibialis Anterior)   5  5  L5 Great toe extension ( EHL)   5  5  S1 Foot eversion (Gastroc, PL & PB)     Sensation  NL Decreased (R/L/BL) Level Sensation    X  L2 Anterio-medial thigh   X  L3 Medial thigh around knee   X  L4 Medial foot   X  L5 Dorsum foot   X  S1 Lateral foot     Reflex  2+  Patellar tendon (L4)   2+  Achilles tendon (S1)       Results for orders placed during the hospital encounter of 10/24/22    MRI Lumbar Spine Without Contrast    Narrative  EXAMINATION:  MRI LUMBAR SPINE WITHOUT CONTRAST    CLINICAL HISTORY:  Low back pain, symptoms persist with > 6wks conservative treatment; Dorsalgia, unspecified    TECHNIQUE:  Multiplanar, multisequence MR images were acquired from the thoracolumbar junction to the sacrum without the administration of contrast.    COMPARISON:  X-ray dated 05/13/2021    FINDINGS:  There are 5 non-rib-bearing lumbar vertebrae.  There is mild grade 1 anterolisthesis of L4 on L5.  Alignment is otherwise unremarkable.  There is no acute fracture or compression deformity.  No aggressive focal signal abnormality.  No pars interarticularis defect.  Mild degenerative endplate changes noted including Modic type 1 edema at the anterior inferior L2 endplate and anterior superior L5 endplate.    Conus medullaris terminates at the T12-L1 level.  Conus medullaris is normal in size and signal.    T12-L1: No significant disc pathology.  No significant spinal canal or neural foraminal stenosis.    L1-L2: No significant disc pathology.  No significant spinal canal or neural foraminal stenosis.    L2-L3: Mild disc desiccation and small circumferential disc bulge with superimposed small central protrusion.  Mild bilateral facet arthropathy.  No significant spinal canal or neural foraminal stenosis.    L3-L4: No significant disc pathology.  Mild bilateral facet arthropathy.  No significant spinal canal or neural foraminal stenosis.    L4-L5: Mild  disc desiccation.  Bilateral facet arthropathy.  No significant spinal canal or neural foraminal stenosis.    L5-S1: Mild disc desiccation and asymmetric to the left small disc bulge.  Mild bilateral facet arthropathy.  No significant spinal canal stenosis.  Minimal left neural foraminal stenosis.    Paraspinal soft tissues are unremarkable.  Simple right renal cysts noted.  Visualized intra-abdominopelvic contents are otherwise unremarkable.    Impression  Multilevel degenerative changes as detailed above including grade 1 anterolisthesis of L4 on L5 as well as mild Modic type 1 edema at the L2 inferior endplate and L5 superior endplate.  Small disc bulge with superimposed small central protrusion at L2-L3.  No resulting spinal canal stenosis.  Minimal left neural foraminal stenosis at L5-S1.  No other significant spinal canal or neural foraminal stenosis.     Lab Results   Component Value Date    WBC 6.56 08/15/2022    HCT 45.2 08/15/2022           INDEPENDENT INTERPRETATION OF TEST:  Relevant imaging results reviewed and interpreted by me, discussed with the patient and / or family today.  Assessment:     1. Spondylolisthesis of lumbar region    2. DDD (degenerative disc disease), lumbar    3. Dorsalgia, unspecified      Plan:     Spondylolisthesis of lumbar region  -     Ambulatory referral/consult to Neurosurgery  -     Cancel: X-Ray Spine Survey AP And Lateral; Future; Expected date: 03/09/2023  -     X-Ray Lumbar Spine Flexion And Extension Only; Future; Expected date: 03/09/2023    DDD (degenerative disc disease), lumbar  -     Ambulatory referral/consult to Neurosurgery  -     Cancel: X-Ray Spine Survey AP And Lateral; Future; Expected date: 03/09/2023  -     X-Ray Lumbar Spine Flexion And Extension Only; Future; Expected date: 03/09/2023    Dorsalgia, unspecified  -     Cancel: X-Ray Spine Survey AP And Lateral; Future; Expected date: 03/09/2023  -     X-Ray Lumbar Spine Flexion And Extension Only; Future;  Expected date: 03/09/2023  -     CT Lumbar Spine Without Contrast; Future; Expected date: 03/09/2023      Discussed current findings on MRI Lumbar spine. Although I do not see any critical stenosis, will recommend additional imaging for evaluation.  X-rays and CT lumbar spine ordered today to better evaluate bony anatomy and rule out instability.   She will follow-up after these studies are completed.  Please call with any changes.    Gabby Sears PA-C  [facility]

## 2023-03-14 ENCOUNTER — PATIENT MESSAGE (OUTPATIENT)
Dept: NEUROSURGERY | Facility: CLINIC | Age: 63
End: 2023-03-14
Payer: COMMERCIAL

## 2023-03-14 ENCOUNTER — HOSPITAL ENCOUNTER (OUTPATIENT)
Dept: RADIOLOGY | Facility: HOSPITAL | Age: 63
Discharge: HOME | End: 2023-03-14
Attending: PHYSICIAN ASSISTANT
Payer: COMMERCIAL

## 2023-03-14 DIAGNOSIS — M54.9 DORSALGIA, UNSPECIFIED: ICD-10-CM

## 2023-03-14 DIAGNOSIS — M51.36 DDD (DEGENERATIVE DISC DISEASE), LUMBAR: ICD-10-CM

## 2023-03-14 DIAGNOSIS — M43.16 SPONDYLOLISTHESIS OF LUMBAR REGION: ICD-10-CM

## 2023-03-14 PROCEDURE — 72131 CT LUMBAR SPINE WITHOUT CONTRAST: ICD-10-PCS | Mod: 26,,, | Performed by: RADIOLOGY

## 2023-03-14 PROCEDURE — 72120 XR LUMBAR SPINE FLEXION AND EXTENSION ONLY: ICD-10-PCS | Mod: 26,59,, | Performed by: RADIOLOGY

## 2023-03-14 PROCEDURE — 72082 X-RAY EXAM ENTIRE SPI 2/3 VW: CPT | Mod: 26,,, | Performed by: RADIOLOGY

## 2023-03-14 PROCEDURE — 72131 CT LUMBAR SPINE W/O DYE: CPT | Mod: TC,PO

## 2023-03-14 PROCEDURE — 72082 XR SCOLIOSIS COMPLETE: ICD-10-PCS | Mod: 26,,, | Performed by: RADIOLOGY

## 2023-03-14 PROCEDURE — 72131 CT LUMBAR SPINE W/O DYE: CPT | Mod: 26,,, | Performed by: RADIOLOGY

## 2023-03-14 PROCEDURE — 72120 X-RAY BEND ONLY L-S SPINE: CPT | Mod: TC,FY,PO

## 2023-03-14 PROCEDURE — 72082 X-RAY EXAM ENTIRE SPI 2/3 VW: CPT | Mod: TC,FY,PO

## 2023-03-14 PROCEDURE — 72120 X-RAY BEND ONLY L-S SPINE: CPT | Mod: 26,59,, | Performed by: RADIOLOGY

## 2023-04-06 ENCOUNTER — PATIENT MESSAGE (OUTPATIENT)
Dept: NEUROSURGERY | Facility: CLINIC | Age: 63
End: 2023-04-06

## 2023-04-06 ENCOUNTER — OFFICE VISIT (OUTPATIENT)
Dept: NEUROSURGERY | Facility: CLINIC | Age: 63
End: 2023-04-06
Payer: COMMERCIAL

## 2023-04-06 VITALS
DIASTOLIC BLOOD PRESSURE: 88 MMHG | BODY MASS INDEX: 37.98 KG/M2 | HEART RATE: 70 BPM | RESPIRATION RATE: 18 BRPM | HEIGHT: 67 IN | WEIGHT: 242 LBS | SYSTOLIC BLOOD PRESSURE: 142 MMHG

## 2023-04-06 DIAGNOSIS — M47.817 FACET ARTHROPATHY, LUMBOSACRAL: ICD-10-CM

## 2023-04-06 DIAGNOSIS — M43.16 SPONDYLOLISTHESIS OF LUMBAR REGION: Primary | ICD-10-CM

## 2023-04-06 DIAGNOSIS — M54.9 DORSALGIA, UNSPECIFIED: ICD-10-CM

## 2023-04-06 PROCEDURE — 99999 PR PBB SHADOW E&M-EST. PATIENT-LVL III: CPT | Mod: PBBFAC,,, | Performed by: PHYSICIAN ASSISTANT

## 2023-04-06 PROCEDURE — 99213 PR OFFICE/OUTPT VISIT, EST, LEVL III, 20-29 MIN: ICD-10-PCS | Mod: S$GLB,,, | Performed by: PHYSICIAN ASSISTANT

## 2023-04-06 PROCEDURE — 1159F PR MEDICATION LIST DOCUMENTED IN MEDICAL RECORD: ICD-10-PCS | Mod: CPTII,S$GLB,, | Performed by: PHYSICIAN ASSISTANT

## 2023-04-06 PROCEDURE — 99213 OFFICE O/P EST LOW 20 MIN: CPT | Mod: S$GLB,,, | Performed by: PHYSICIAN ASSISTANT

## 2023-04-06 PROCEDURE — 3077F PR MOST RECENT SYSTOLIC BLOOD PRESSURE >= 140 MM HG: ICD-10-PCS | Mod: CPTII,S$GLB,, | Performed by: PHYSICIAN ASSISTANT

## 2023-04-06 PROCEDURE — 3077F SYST BP >= 140 MM HG: CPT | Mod: CPTII,S$GLB,, | Performed by: PHYSICIAN ASSISTANT

## 2023-04-06 PROCEDURE — 3008F BODY MASS INDEX DOCD: CPT | Mod: CPTII,S$GLB,, | Performed by: PHYSICIAN ASSISTANT

## 2023-04-06 PROCEDURE — 3079F DIAST BP 80-89 MM HG: CPT | Mod: CPTII,S$GLB,, | Performed by: PHYSICIAN ASSISTANT

## 2023-04-06 PROCEDURE — 1159F MED LIST DOCD IN RCRD: CPT | Mod: CPTII,S$GLB,, | Performed by: PHYSICIAN ASSISTANT

## 2023-04-06 PROCEDURE — 3008F PR BODY MASS INDEX (BMI) DOCUMENTED: ICD-10-PCS | Mod: CPTII,S$GLB,, | Performed by: PHYSICIAN ASSISTANT

## 2023-04-06 PROCEDURE — 3079F PR MOST RECENT DIASTOLIC BLOOD PRESSURE 80-89 MM HG: ICD-10-PCS | Mod: CPTII,S$GLB,, | Performed by: PHYSICIAN ASSISTANT

## 2023-04-06 PROCEDURE — 99999 PR PBB SHADOW E&M-EST. PATIENT-LVL III: ICD-10-PCS | Mod: PBBFAC,,, | Performed by: PHYSICIAN ASSISTANT

## 2023-04-06 RX ORDER — CYCLOSPORINE 0.5 MG/ML
1 EMULSION OPHTHALMIC 2 TIMES DAILY
COMMUNITY
Start: 2023-03-27 | End: 2024-01-19

## 2023-04-06 NOTE — PROGRESS NOTES
Subjective:      Patient ID: Kentrell Aguilera is a 63 y.o. female.    HPI  The patient is here today for CT follow-up.  Patient reports that she has occasional sciatic pain in both legs.  Rates her pain 8/10 today.  No significant changes since her last office visit.    Last note:  The patient is here today for evaluation of lumbar spine pain.  She is referred to our clinic by Dr. Salinas Jennings.   Pain has been present for about a year now.   Pain is localized to her lower back   Nothing make it better.  Feels like her back pain is affecting her walking.  Pain radiates to her right leg with tingling feeling.  Denies weakness in her legs.  Has some knee issues.   Pt states she was a  for years and believes the pain is from wear and tear.  Denies fall or MVA  Does not want to take the gabapentin or any other pill.  Denies loss of BB function or SA.   Her walking is down to a minimum, feels like the PT would only make things worse.  Would like the back brace that Dr. Vaughn.  Pt has a history of vertigo.     Her main concern is lower back pain.  She does have leg pain at times and recently she had pain that affected her RLE.  Sitting is very uncomfortable.   Today she is having more pain on the L side but usually it's the R side.  She attended a christening a month ago and the following day could barely walk.  Over a year ago she did have a fall and fell onto her face, required sutures above her R eyebrow.     Pain Procedures:   -01/30/2023:  Bilateral L3-L5 lumbar RFA, 10% relief  -11/09/2022:  Bilateral L5-S1 transforaminal epidural steroid injection, resolution of bilateral lower extremity pain, no relief in axial low back pain    Objective:     Body mass index is 37.9 kg/m².  Vitals:    04/06/23 1024   BP: (!) 142/88   Pulse: 70   Resp: 18        Back:  None  Paraspinal muscle spasms   None  Pain with flexion and extention   WNL  Range of motion    Neg  Straight leg raise     Motor   Right Right  Left Left  Level Group   5  5  L2 Hip flexor (Psoas)   5  5  L3 Leg extension (Quads)   5  5  L4 Dorsiflexion & foot inversion (Tibialis Anterior)   5  5  L5 Great toe extension ( EHL)   5  5  S1 Foot eversion (Gastroc, PL & PB)     Sensation  NL Decreased (R/L/BL) Level Sensation    X  L2 Anterio-medial thigh   X  L3 Medial thigh around knee   X  L4 Medial foot   X  L5 Dorsum foot   X  S1 Lateral foot     Back Exam     Tenderness   The patient is experiencing tenderness in the lumbar.            Results for orders placed during the hospital encounter of 10/24/22    MRI Lumbar Spine Without Contrast    FINDINGS:  There are 5 non-rib-bearing lumbar vertebrae.  There is mild grade 1 anterolisthesis of L4 on L5.  Alignment is otherwise unremarkable.  There is no acute fracture or compression deformity.  No aggressive focal signal abnormality.  No pars interarticularis defect.  Mild degenerative endplate changes noted including Modic type 1 edema at the anterior inferior L2 endplate and anterior superior L5 endplate.  Conus medullaris terminates at the T12-L1 level.  Conus medullaris is normal in size and signal.    T12-L1: No significant disc pathology.  No significant spinal canal or neural foraminal stenosis.  L1-L2: No significant disc pathology.  No significant spinal canal or neural foraminal stenosis.  L2-L3: Mild disc desiccation and small circumferential disc bulge with superimposed small central protrusion.  Mild bilateral facet arthropathy.  No significant spinal canal or neural foraminal stenosis.  L3-L4: No significant disc pathology.  Mild bilateral facet arthropathy.  No significant spinal canal or neural foraminal stenosis.  L4-L5: Mild disc desiccation.  Bilateral facet arthropathy.  No significant spinal canal or neural foraminal stenosis.  L5-S1: Mild disc desiccation and asymmetric to the left small disc bulge.  Mild bilateral facet arthropathy.  No significant spinal canal stenosis.  Minimal left  neural foraminal stenosis.  Paraspinal soft tissues are unremarkable.  Simple right renal cysts noted.  Visualized intra-abdominopelvic contents are otherwise unremarkable.    Impression  Multilevel degenerative changes as detailed above including grade 1 anterolisthesis of L4 on L5 as well as mild Modic type 1 edema at the L2 inferior endplate and L5 superior endplate.  Small disc bulge with superimposed small central protrusion at L2-L3.  No resulting spinal canal stenosis.  Minimal left neural foraminal stenosis at L5-S1.  No other significant spinal canal or neural foraminal stenosis.      X-rays:  FINDINGS:  Grade 1 anterolisthesis of L4 on L5.  Mild multilevel discogenic degenerative change.  No acute fracture or dislocation.  No evidence of dynamic instability.   No evidence of scoliosis.  Grade 1 anterolisthesis of L4 on L5.     CT Lumbar:  FINDINGS:  Unchanged incompletely visualized right upper pole large renal cyst compared to 12/02/2021.  Cholecystectomy clips.   SI joint vacuum phenomena.   No acute fracture or dislocation.   T12-L1: Mild disc height loss   L1-2: Mild disc height loss   L2-3: Mild disc height loss.  Mild facet arthropathy   L3-4: Mild disc height loss.  Mild facet arthropathy   L4-5: Grade 1 anterolisthesis of L4 on L5.  Mild disc height loss.  Severe facet arthropathy with vacuum phenomenon.  No spondylolysis.   L5-S1: Moderate disc height loss with vacuum phenomenon.  Moderate severity facet arthropathy.     Impression:   No acute abnormality.  Chronic degenerative changes most notable at at L4-5 and L5-S1.  Lab Results   Component Value Date    WBC 6.56 08/15/2022    HCT 45.2 08/15/2022           INDEPENDENT INTERPRETATION OF TEST:  Relevant imaging results reviewed and interpreted by me, discussed with the patient and / or family today.  Assessment:     1. Spondylolisthesis of lumbar region    2. Dorsalgia, unspecified    3. Facet arthropathy, lumbosacral      Plan:      Spondylolisthesis of lumbar region  -     Procedure Order to Pain Management; Future; Expected date: 04/18/2023  -     Procedure Order to Pain Management; Future; Expected date: 04/18/2023    Dorsalgia, unspecified  -     Procedure Order to Pain Management; Future; Expected date: 04/18/2023  -     Procedure Order to Pain Management; Future; Expected date: 04/18/2023    Facet arthropathy, lumbosacral  -     Procedure Order to Pain Management; Future; Expected date: 04/18/2023  -     Procedure Order to Pain Management; Future; Expected date: 04/18/2023      CT and x-ray findings were discussed with the patient today.  She is not interested in surgical intervention at this time.   Will recommend conservative treatment including injections.  I have placed orders for TFESI and facet injections.  She will follow-up after these injections for reassessment.  Please reach out with any changes.    Gabby Sears PA-C  [facility]

## 2023-04-12 ENCOUNTER — PATIENT MESSAGE (OUTPATIENT)
Dept: NEUROSURGERY | Facility: CLINIC | Age: 63
End: 2023-04-12
Payer: COMMERCIAL

## 2023-04-18 ENCOUNTER — PATIENT MESSAGE (OUTPATIENT)
Dept: NEUROSURGERY | Facility: CLINIC | Age: 63
End: 2023-04-18
Payer: COMMERCIAL

## 2023-04-18 ENCOUNTER — PATIENT MESSAGE (OUTPATIENT)
Dept: PAIN MEDICINE | Facility: CLINIC | Age: 63
End: 2023-04-18
Payer: COMMERCIAL

## 2023-04-18 DIAGNOSIS — M47.816 LUMBAR SPONDYLOSIS: Primary | ICD-10-CM

## 2023-04-19 ENCOUNTER — PATIENT MESSAGE (OUTPATIENT)
Dept: PAIN MEDICINE | Facility: CLINIC | Age: 63
End: 2023-04-19
Payer: COMMERCIAL

## 2023-04-24 NOTE — PRE-PROCEDURE INSTRUCTIONS
Spoke with patient regarding procedure scheduled on 4.27    Arrival time 1145    Has patient been sick with fever or on antibiotics within the last 7 days? No    Has patient received a vaccination within the last 7 days? no    Has the patient stopped all medications as directed? NA    Does patient have a pacemaker and or defibrillator? no    Does the patient have a ride to and from procedure and someone reliable to remain with patient? FRIEND     Is the patient diabetic? no    Does the patient have sleep apnea? Or use O2 at home? No and no     Is the patient receiving sedation? yes    Is the patient instructed to remain NPO beginning at midnight the night before their procedure? yes    Procedure location confirmed with patient? Yes    Covid- Denies signs/symptoms. Instructed to notify PAT/MD if any changes.

## 2023-04-25 ENCOUNTER — PATIENT MESSAGE (OUTPATIENT)
Dept: PAIN MEDICINE | Facility: HOSPITAL | Age: 63
End: 2023-04-25
Payer: COMMERCIAL

## 2023-04-27 ENCOUNTER — HOSPITAL ENCOUNTER (OUTPATIENT)
Facility: HOSPITAL | Age: 63
Discharge: HOME | End: 2023-04-27
Attending: ANESTHESIOLOGY | Admitting: ANESTHESIOLOGY
Payer: COMMERCIAL

## 2023-04-27 VITALS
DIASTOLIC BLOOD PRESSURE: 69 MMHG | BODY MASS INDEX: 38.53 KG/M2 | RESPIRATION RATE: 17 BRPM | WEIGHT: 245.5 LBS | HEART RATE: 69 BPM | OXYGEN SATURATION: 97 % | SYSTOLIC BLOOD PRESSURE: 145 MMHG | HEIGHT: 67 IN | TEMPERATURE: 98 F

## 2023-04-27 DIAGNOSIS — M47.816 LUMBAR SPONDYLOSIS: Primary | ICD-10-CM

## 2023-04-27 PROCEDURE — 64494 INJ PARAVERT F JNT L/S 2 LEV: CPT | Mod: 50,,, | Performed by: ANESTHESIOLOGY

## 2023-04-27 PROCEDURE — 64494 INJ PARAVERT F JNT L/S 2 LEV: CPT | Mod: RT | Performed by: ANESTHESIOLOGY

## 2023-04-27 PROCEDURE — 64493 PR INJ DX/THER AGNT PARAVERT FACET JOINT,IMG GUIDE,LUMBAR/SAC,1ST LVL: ICD-10-PCS | Mod: 50,,, | Performed by: ANESTHESIOLOGY

## 2023-04-27 PROCEDURE — 64493 INJ PARAVERT F JNT L/S 1 LEV: CPT | Mod: LT | Performed by: ANESTHESIOLOGY

## 2023-04-27 PROCEDURE — 25000003 PHARM REV CODE 250: Performed by: ANESTHESIOLOGY

## 2023-04-27 PROCEDURE — 25500020 PHARM REV CODE 255: Performed by: ANESTHESIOLOGY

## 2023-04-27 PROCEDURE — 63600175 PHARM REV CODE 636 W HCPCS: Performed by: ANESTHESIOLOGY

## 2023-04-27 PROCEDURE — 64494 PR INJ DX/THER AGNT PARAVERT FACET JOINT,IMG GUIDE,LUMBAR/SAC, 2ND LEVEL: ICD-10-PCS | Mod: 50,,, | Performed by: ANESTHESIOLOGY

## 2023-04-27 PROCEDURE — 64493 INJ PARAVERT F JNT L/S 1 LEV: CPT | Mod: 50,,, | Performed by: ANESTHESIOLOGY

## 2023-04-27 RX ORDER — METHYLPREDNISOLONE ACETATE 40 MG/ML
INJECTION, SUSPENSION INTRA-ARTICULAR; INTRALESIONAL; INTRAMUSCULAR; SOFT TISSUE
Status: DISCONTINUED | OUTPATIENT
Start: 2023-04-27 | End: 2023-04-27 | Stop reason: HOSPADM

## 2023-04-27 RX ORDER — FENTANYL CITRATE 50 UG/ML
INJECTION, SOLUTION INTRAMUSCULAR; INTRAVENOUS
Status: DISCONTINUED | OUTPATIENT
Start: 2023-04-27 | End: 2023-04-27 | Stop reason: HOSPADM

## 2023-04-27 RX ORDER — MIDAZOLAM HYDROCHLORIDE 1 MG/ML
INJECTION, SOLUTION INTRAMUSCULAR; INTRAVENOUS
Status: DISCONTINUED | OUTPATIENT
Start: 2023-04-27 | End: 2023-04-27 | Stop reason: HOSPADM

## 2023-04-27 RX ORDER — ONDANSETRON 2 MG/ML
4 INJECTION INTRAMUSCULAR; INTRAVENOUS ONCE AS NEEDED
Status: DISCONTINUED | OUTPATIENT
Start: 2023-04-27 | End: 2023-04-27 | Stop reason: HOSPADM

## 2023-04-27 RX ORDER — LIDOCAINE HYDROCHLORIDE 10 MG/ML
INJECTION, SOLUTION EPIDURAL; INFILTRATION; INTRACAUDAL; PERINEURAL
Status: DISCONTINUED | OUTPATIENT
Start: 2023-04-27 | End: 2023-04-27 | Stop reason: HOSPADM

## 2023-04-27 NOTE — OP NOTE
Lumbar Facet Joint Injection Under Fluoroscopy    INFORMED CONSENT: The procedure, risks, benefits and options were discussed with patient. There are no contraindications to the procedure. The patient expressed understanding and agreed to proceed. The personnel performing the procedure was discussed.    Date of procedure 04/27/2023    Time-out taken to identify patient and procedure side prior to starting the procedure.                                                                       PROCEDURE:  bilateral facet joint injection at L4/L5 and L5/S1 under fluoroscopy.    REASON FOR PROCEDURE: Lumbar spondylosis [M47.816]  1. Lumbar spondylosis      POSTOP DIAGNOSIS: Lumbar spondylosis [M47.816]  1. Lumbar spondylosis        PHYSICIAN: Salinas Jennings MD    ASSISTANTS: none    MEDICATIONS INJECTED:  1ml Depomedrol 40mg/ml and 3ml of Lidocaine PF 1%, split evenly between injection sites    LOCAL ANESTHETIC USED:   1ml Lidocaine PF 1%, at each level      Sedation: Conscious sedation provided by MFAVIAN    SEDATION MEDICATIONS: local/IV sedation: Versed 2 mg and fentanyl 100 mcg IV.  Conscious sedation ordered by MD.  Patient reevaluated and sedation administered by MD and monitored by RN.  Total sedation time was less than 15 min.    ESTIMATED BLOOD LOSS:  None.    COMPLICATIONS:  None.    TECHNIQUE:   Lying in a prone position, the patient was prepped and draped in the usual sterile fashion using ChloraPrep and fenestrated drape.  The level was determined under fluoroscopic guidance.  Local anesthetic was given by going down to the hub of the 27-gauge 1.25in needle and raising a wheel.  The 3.5in 22-gauge needle was introduced into all levels as stated above facet joints.  Negative pressure applied to make sure that there was no intravascular placement.  Omnipaque was injected to confirm placement.  It was also done to confirm that there was no vascular runoff.  Medication was then injected slowly.  The patient  tolerated the procedure well.      The patient was monitored after the procedure.  Patient was given post procedure and discharge instructions to follow at home.  We will see the patient back in two weeks or the patient may call to inform of status. The patient was discharged in a stable condition

## 2023-04-27 NOTE — DISCHARGE INSTRUCTIONS

## 2023-04-27 NOTE — H&P
HPI  Patient presenting for Procedure(s) (LRB):  Bilateral L4/L5 and L5/S1 Facet Injection (Bilateral)     Patient on Anti-coagulation No    No health changes since previous encounter    History reviewed. No pertinent past medical history.  Past Surgical History:   Procedure Laterality Date    BREAST BIOPSY Left     negative 2015 negative    INJECTION OF ANESTHETIC AGENT AROUND MEDIAL BRANCH NERVES INNERVATING LUMBAR FACET JOINT Bilateral 12/19/2022    Procedure: Bilateral L3-5 MBB;  Surgeon: Salinas Jennings MD;  Location: [facility];  Service: Pain Management;  Laterality: Bilateral;    INJECTION OF ANESTHETIC AGENT AROUND MEDIAL BRANCH NERVES INNERVATING LUMBAR FACET JOINT Bilateral 1/9/2023    Procedure: Bilateral L3-5 MBB;  Surgeon: Salinas Jennings MD;  Location: [facility];  Service: Pain Management;  Laterality: Bilateral;    RADIOFREQUENCY THERMOCOAGULATION Bilateral 1/30/2023    Procedure: Bilateral L3-5 Lumbar RFA;  Surgeon: Salinas Jennings MD;  Location: [facility];  Service: Pain Management;  Laterality: Bilateral;    SELECTIVE INJECTION OF ANESTHETIC AGENT AROUND LUMBAR SPINAL NERVE ROOT BY TRANSFORAMINAL APPROACH Bilateral 11/9/2022    Procedure: Bilateral L5/S1 TF LAUREN;  Surgeon: Salinas Jennings MD;  Location: [facility];  Service: Pain Management;  Laterality: Bilateral;     Review of patient's allergies indicates:   Allergen Reactions    Erythromycin     Pcn [penicillins]         No current facility-administered medications on file prior to encounter.     Current Outpatient Medications on File Prior to Encounter   Medication Sig Dispense Refill    ALPRAZolam (XANAX) 0.25 MG tablet Take 1 tablet (0.25 mg total) by mouth nightly as needed for Anxiety. (Patient taking differently: Take 0.5 mg by mouth nightly as needed for Anxiety.) 30 tablet 2    betamethasone dipropionate (DIPROLENE) 0.05 % ointment Apply topically 2 (two) times daily. for 10 days 45 g 0    doxycycline (VIBRA-TABS)  "100 MG tablet Take 1 tablet (100 mg total) by mouth 2 (two) times daily. 20 tablet 0    hydrocortisone 2.5 % cream APPLY  CREAM TO RASH TWICE DAILY 28 g 5    meclizine (ANTIVERT) 25 mg tablet Take 1 tablet (25 mg total) by mouth 3 (three) times daily as needed. 100 tablet 11    ofloxacin (OCUFLOX) 0.3 % ophthalmic solution       PROLENSA 0.07 % Drop SMARTSIG:In Eye(s)      RESTASIS 0.05 % ophthalmic emulsion Place 1 drop into both eyes 2 (two) times daily.          PMHx, PSHx, Allergies, Medications reviewed in epic    ROS negative except pain complaints in HPI    OBJECTIVE:    /86 (BP Location: Right arm, Patient Position: Sitting)   Pulse 68   Temp 97.6 °F (36.4 °C) (Temporal)   Resp 15   Ht 5' 7" (1.702 m)   Wt 111.4 kg (245 lb 7.7 oz)   SpO2 97%   Breastfeeding No   BMI 38.45 kg/m²     PHYSICAL EXAMINATION:    GENERAL: Well appearing, in no acute distress, alert and oriented x3.  PSYCH:  Mood and affect appropriate.  SKIN: Skin color, texture, turgor normal, no rashes or lesions which will impact the procedure.  CV: RRR with palpation of the radial artery.  PULM: No evidence of respiratory difficulty, symmetric chest rise. Clear to auscultation.  NEURO: Cranial nerves grossly intact.    Plan:    Proceed with procedure as planned Procedure(s) (LRB):  Bilateral L4/L5 and L5/S1 Facet Injection (Bilateral)    Salinas Jennings MD  04/27/2023            "

## 2023-04-27 NOTE — DISCHARGE SUMMARY
Discharge Note  Short Stay      SUMMARY     Admit Date: 4/27/2023    Attending Physician: Salinas Jennings MD        Discharge Physician: Salinas Jennings MD        Discharge Date: 4/27/2023 1:04 PM    Procedure(s) (LRB):  Bilateral L4/L5 and L5/S1 Facet Injection (Bilateral)    Final Diagnosis: Lumbar spondylosis [M47.816]    Disposition: Home or self care    Patient Instructions:   Current Discharge Medication List        CONTINUE these medications which have NOT CHANGED    Details   ALPRAZolam (XANAX) 0.25 MG tablet Take 1 tablet (0.25 mg total) by mouth nightly as needed for Anxiety.  Qty: 30 tablet, Refills: 2      betamethasone dipropionate (DIPROLENE) 0.05 % ointment Apply topically 2 (two) times daily. for 10 days  Qty: 45 g, Refills: 0      doxycycline (VIBRA-TABS) 100 MG tablet Take 1 tablet (100 mg total) by mouth 2 (two) times daily.  Qty: 20 tablet, Refills: 0      hydrocortisone 2.5 % cream APPLY  CREAM TO RASH TWICE DAILY  Qty: 28 g, Refills: 5      meclizine (ANTIVERT) 25 mg tablet Take 1 tablet (25 mg total) by mouth 3 (three) times daily as needed.  Qty: 100 tablet, Refills: 11      ofloxacin (OCUFLOX) 0.3 % ophthalmic solution       PROLENSA 0.07 % Drop SMARTSIG:In Eye(s)      RESTASIS 0.05 % ophthalmic emulsion Place 1 drop into both eyes 2 (two) times daily.                 Discharge Diagnosis: Lumbar spondylosis [M47.816]  Condition on Discharge: Stable with no complications to procedure   Diet on Discharge: Same as before.  Activity: as per instruction sheet.  Discharge to: Home with a responsible adult.  Follow up: 2-4 weeks       Please call the office at [phone] if you experience any weakness or loss of sensation, fever > 101.5, pain uncontrolled with oral medications, persistent nausea/vomiting/or diarrhea, redness or drainage from the incisions, or any other worrisome concerns. If physician on call was not reached or could not communicate with our office for any reason please go  to the nearest emergency department

## 2023-04-28 RX ORDER — ALPRAZOLAM 0.25 MG/1
TABLET ORAL
Qty: 30 TABLET | Refills: 5 | Status: SHIPPED | OUTPATIENT
Start: 2023-04-28 | End: 2024-02-07 | Stop reason: SDUPTHER

## 2023-04-28 RX ORDER — MECLIZINE HYDROCHLORIDE 25 MG/1
TABLET ORAL
Qty: 90 TABLET | Refills: 0 | Status: SHIPPED | OUTPATIENT
Start: 2023-04-28 | End: 2024-01-19

## 2023-04-28 NOTE — TELEPHONE ENCOUNTER
No care due was identified.  [facility] Embedded Care Due Messages. Reference number: [id number].   4/28/2023 10:09:59 AM CDT

## 2023-04-28 NOTE — TELEPHONE ENCOUNTER
Pt requesting refill before 5/1/23    Attempted to schedule pt for future appt.  Pt stated that she isn't home to look at calender.    Pt sent a scheduling ticket to schedule annual appt.

## 2023-05-09 ENCOUNTER — TELEPHONE (OUTPATIENT)
Dept: FAMILY MEDICINE | Facility: CLINIC | Age: 63
End: 2023-05-09
Payer: COMMERCIAL
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
## 2023-05-09 NOTE — TELEPHONE ENCOUNTER
Pt fell May 8 at home; rug slippd and fell onto knees an delbow at 9:30 PM    Couldn't get up; destini Mccormick helpd her up, applied ice    Plan: xray  Tyleon, advil, ice  Make house so not to fall

## 2023-05-10 ENCOUNTER — HOSPITAL ENCOUNTER (OUTPATIENT)
Dept: RADIOLOGY | Facility: HOSPITAL | Age: 63
Discharge: HOME | End: 2023-05-10
Attending: FAMILY MEDICINE
Payer: COMMERCIAL

## 2023-05-10 ENCOUNTER — PATIENT MESSAGE (OUTPATIENT)
Dept: PAIN MEDICINE | Facility: CLINIC | Age: 63
End: 2023-05-10
Payer: COMMERCIAL

## 2023-05-10 ENCOUNTER — PATIENT MESSAGE (OUTPATIENT)
Dept: NEUROSURGERY | Facility: CLINIC | Age: 63
End: 2023-05-10
Payer: COMMERCIAL
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
## 2023-05-10 PROCEDURE — 73080 XR ELBOW COMPLETE 3 VIEW RIGHT: ICD-10-PCS | Mod: 26,RT,, | Performed by: RADIOLOGY

## 2023-05-10 PROCEDURE — 73562 X-RAY EXAM OF KNEE 3: CPT | Mod: 26,RT,, | Performed by: RADIOLOGY

## 2023-05-10 PROCEDURE — 73080 X-RAY EXAM OF ELBOW: CPT | Mod: 26,RT,, | Performed by: RADIOLOGY

## 2023-05-10 PROCEDURE — 73080 X-RAY EXAM OF ELBOW: CPT | Mod: TC,FY,PO,RT

## 2023-05-10 PROCEDURE — 73562 X-RAY EXAM OF KNEE 3: CPT | Mod: TC,FY,PO,RT

## 2023-05-10 PROCEDURE — 73562 XR KNEE 3 VIEW RIGHT: ICD-10-PCS | Mod: 26,RT,, | Performed by: RADIOLOGY

## 2023-05-10 NOTE — PROGRESS NOTES
Pt needs open mri right elbow and knee; call pt    She is aware of the xray results and that i'm ordering these mri's

## 2023-05-11 ENCOUNTER — PATIENT MESSAGE (OUTPATIENT)
Dept: NEUROSURGERY | Facility: CLINIC | Age: 63
End: 2023-05-11
Payer: COMMERCIAL

## 2023-05-12 ENCOUNTER — PATIENT MESSAGE (OUTPATIENT)
Dept: NEUROSURGERY | Facility: CLINIC | Age: 63
End: 2023-05-12
Payer: COMMERCIAL

## 2023-05-12 NOTE — TELEPHONE ENCOUNTER
Called pt as requested. All pt questions were answered (how long injection is expected to last, how to check in for virtual appt). Pt was frustrated that she did not receive a f/u call s/p injection. I apologized and explained that a f/u is usually scheduled once we have the inj date so we can f/u in clinic. U/v

## 2023-05-15 ENCOUNTER — TELEPHONE (OUTPATIENT)
Dept: FAMILY MEDICINE | Facility: CLINIC | Age: 63
End: 2023-05-15
Payer: COMMERCIAL
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
## 2023-05-15 NOTE — TELEPHONE ENCOUNTER
MRI denied  Pt needs to see ortho per dr jacqueline Mann please place the referral to ortho and rtn to staff to notify the pt

## 2023-05-16 ENCOUNTER — TELEPHONE (OUTPATIENT)
Dept: FAMILY MEDICINE | Facility: CLINIC | Age: 63
End: 2023-05-16
Payer: COMMERCIAL

## 2023-05-18 ENCOUNTER — OFFICE VISIT (OUTPATIENT)
Dept: NEUROSURGERY | Facility: CLINIC | Age: 63
End: 2023-05-18
Payer: COMMERCIAL

## 2023-05-18 DIAGNOSIS — M47.817 FACET ARTHROPATHY, LUMBOSACRAL: Primary | ICD-10-CM

## 2023-05-18 DIAGNOSIS — M43.16 SPONDYLOLISTHESIS OF LUMBAR REGION: ICD-10-CM

## 2023-05-18 DIAGNOSIS — M54.9 DORSALGIA, UNSPECIFIED: ICD-10-CM

## 2023-05-18 PROCEDURE — 99212 OFFICE O/P EST SF 10 MIN: CPT | Mod: 95,,, | Performed by: PHYSICIAN ASSISTANT

## 2023-05-18 PROCEDURE — 99212 PR OFFICE/OUTPT VISIT, EST, LEVL II, 10-19 MIN: ICD-10-PCS | Mod: 95,,, | Performed by: PHYSICIAN ASSISTANT

## 2023-05-18 RX ORDER — DICLOFENAC SODIUM 75 MG/1
75 TABLET, DELAYED RELEASE ORAL 2 TIMES DAILY PRN
Qty: 60 TABLET | Refills: 1 | Status: SHIPPED | OUTPATIENT
Start: 2023-05-18 | End: 2024-01-19

## 2023-05-18 NOTE — PROGRESS NOTES
The patient location is: home/work  The chief complaint leading to consultation is: injection follow-up    Visit type: audiovisual    Face to Face time with patient: 15-20 minutes of total time spent on the encounter, which includes face to face time and non-face to face time preparing to see the patient (eg, review of tests), Obtaining and/or reviewing separately obtained history, Documenting clinical information in the electronic or other health record, Independently interpreting results (not separately reported) and communicating results to the patient/family/caregiver, or Care coordination (not separately reported).     Each patient to whom he or she provides medical services by telemedicine is:  (1) informed of the relationship between the physician and patient and the respective role of any other health care provider with respect to management of the patient; and (2) notified that he or she may decline to receive medical services by telemedicine and may withdraw from such care at any time.    Notes:   The patient was scheduled today for injection follow-up.   On 4/27/23 she had marcie L4/5, L5/S1 facet injections with Dr. Jennings.  Reports over 75% relief.  Patient is getting up and able to do more after the injections and is very happy with her results and progress.  She did have a fall recently. Fell on both elbows and knees. Having increased R knee pain and seeing Ortho for MRI this weekend.  Overall her back pain is much improved.  Will Rx NSAID to take as needed for pain and inflammation relief.  Advised pt to follow-up in 3-6 months or sooner if needed.

## 2023-06-06 ENCOUNTER — PATIENT MESSAGE (OUTPATIENT)
Dept: NEUROSURGERY | Facility: CLINIC | Age: 63
End: 2023-06-06
Payer: COMMERCIAL

## 2023-08-03 ENCOUNTER — TELEPHONE (OUTPATIENT)
Dept: FAMILY MEDICINE | Facility: CLINIC | Age: 63
End: 2023-08-03
Payer: COMMERCIAL

## 2023-08-03 DIAGNOSIS — R06.83 SNORES: Primary | ICD-10-CM

## 2023-08-03 DIAGNOSIS — R53.83 FATIGUE, UNSPECIFIED TYPE: ICD-10-CM

## 2023-08-09 ENCOUNTER — PATIENT MESSAGE (OUTPATIENT)
Dept: NEUROSURGERY | Facility: CLINIC | Age: 63
End: 2023-08-09
Payer: COMMERCIAL

## 2023-08-16 ENCOUNTER — TELEPHONE (OUTPATIENT)
Dept: FAMILY MEDICINE | Facility: CLINIC | Age: 63
End: 2023-08-16
Payer: COMMERCIAL

## 2023-08-16 DIAGNOSIS — R53.83 FATIGUE, UNSPECIFIED TYPE: Primary | ICD-10-CM

## 2023-08-16 DIAGNOSIS — Z00.00 ANNUAL PHYSICAL EXAM: ICD-10-CM
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
## 2023-08-16 DIAGNOSIS — M25.562 CHRONIC PAIN OF BOTH KNEES: ICD-10-CM

## 2023-08-16 DIAGNOSIS — G89.29 CHRONIC PAIN OF BOTH KNEES: ICD-10-CM

## 2023-08-16 DIAGNOSIS — M25.561 CHRONIC PAIN OF BOTH KNEES: ICD-10-CM

## 2023-08-17 ENCOUNTER — PATIENT MESSAGE (OUTPATIENT)
Dept: NEUROSURGERY | Facility: CLINIC | Age: 63
End: 2023-08-17

## 2023-08-17 ENCOUNTER — OFFICE VISIT (OUTPATIENT)
Dept: NEUROSURGERY | Facility: CLINIC | Age: 63
End: 2023-08-17
Payer: COMMERCIAL

## 2023-08-17 DIAGNOSIS — M51.36 DDD (DEGENERATIVE DISC DISEASE), LUMBAR: ICD-10-CM

## 2023-08-17 DIAGNOSIS — M47.817 FACET ARTHROPATHY, LUMBOSACRAL: Primary | ICD-10-CM

## 2023-08-17 DIAGNOSIS — M43.16 SPONDYLOLISTHESIS OF LUMBAR REGION: ICD-10-CM

## 2023-08-17 DIAGNOSIS — M54.9 DORSALGIA, UNSPECIFIED: ICD-10-CM

## 2023-08-17 PROCEDURE — 99442 PR PHYSICIAN TELEPHONE EVALUATION 11-20 MIN: ICD-10-PCS | Mod: 95,,, | Performed by: PHYSICIAN ASSISTANT

## 2023-08-17 PROCEDURE — 99442 PR PHYSICIAN TELEPHONE EVALUATION 11-20 MIN: CPT | Mod: 95,,, | Performed by: PHYSICIAN ASSISTANT

## 2023-08-17 NOTE — PROGRESS NOTES
Established Patient - Audio Only Telehealth Visit   The patient location is: home  The chief complaint leading to consultation is: increased back pain  Visit type: Virtual visit with audio only (telephone)  Total time spent with patient: 19 minutes     The reason for the audio only service rather than synchronous audio and video virtual visit was related to technical difficulties or patient preference/necessity.   Each patient to whom I provide medical services by telemedicine is:  (1) informed of the relationship between the physician and patient and the respective role of any other health care provider with respect to management of the patient; and (2) notified that they may decline to receive medical services by telemedicine and may withdraw from such care at any time. Patient verbally consented to receive this service via voice-only telephone call.       HPI:   The patient is scheduled today for an audio visit to discuss increasing back pain.   A few weeks ago she could not get out of the bed due to severe back pain. After 15 min she had to roll out of the bed and needed assistance at a .  Over the next few days it did subside with the use of ice packs.  Since her last visit, she did receive lumbar facet injections.   Patient states the injection worked well until a fall at home 4+ weeks ago. She slipped and fell onto both knees and elbows.  Also having R knee issues- R knee ATS planned soon. 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  Walking is painful.  She is not having as much leg pain. The majority of her pain is in the lower back.    Assessment and plan:       Patient requests a TENS unit. Orders were placed today. Pt states she will be at the [facility] next Tuesday if she needs to .  - DME informed me to fax order to main office at [phone].  She is interested in a repeat injection after October (last week if possible). Will place D2P orders  for L4/5 + L5/S1 facet injections.  Patient advised to reach out with any changes.                     This service was not originating from a related E/M service provided within the previous 7 days nor will  to an E/M service or procedure within the next 24 hours or my soonest available appointment.  Prevailing standard of care was able to be met in this audio-only visit.

## 2023-08-18 ENCOUNTER — PATIENT MESSAGE (OUTPATIENT)
Dept: PAIN MEDICINE | Facility: CLINIC | Age: 63
End: 2023-08-18
Payer: COMMERCIAL

## 2023-08-18 DIAGNOSIS — M47.816 LUMBAR SPONDYLOSIS: Primary | ICD-10-CM

## 2023-08-21 ENCOUNTER — PATIENT MESSAGE (OUTPATIENT)
Dept: PAIN MEDICINE | Facility: CLINIC | Age: 63
End: 2023-08-21
Payer: COMMERCIAL

## 2023-08-21 NOTE — TELEPHONE ENCOUNTER
Spoke with pt via phone. All questions were answered and procedure was scheduled for 11/01/23. 1 month after her knee surgery.

## 2023-08-22 ENCOUNTER — OFFICE VISIT (OUTPATIENT)
Dept: SLEEP MEDICINE | Facility: CLINIC | Age: 63
End: 2023-08-22
Payer: COMMERCIAL

## 2023-08-22 ENCOUNTER — TELEPHONE (OUTPATIENT)
Dept: SLEEP MEDICINE | Facility: CLINIC | Age: 63
End: 2023-08-22

## 2023-08-22 VITALS
BODY MASS INDEX: 38.2 KG/M2 | SYSTOLIC BLOOD PRESSURE: 138 MMHG | HEIGHT: 67 IN | WEIGHT: 243.38 LBS | HEART RATE: 82 BPM | DIASTOLIC BLOOD PRESSURE: 82 MMHG | RESPIRATION RATE: 18 BRPM | OXYGEN SATURATION: 95 %

## 2023-08-22 DIAGNOSIS — E66.9 OBESITY, CLASS II, BMI 35-39.9: ICD-10-CM

## 2023-08-22 DIAGNOSIS — R06.83 SNORES: ICD-10-CM

## 2023-08-22 DIAGNOSIS — G47.00 INSOMNIA, UNSPECIFIED TYPE: Primary | ICD-10-CM

## 2023-08-22 DIAGNOSIS — G47.30 SLEEP DISORDER BREATHING: ICD-10-CM

## 2023-08-22 DIAGNOSIS — R53.83 FATIGUE, UNSPECIFIED TYPE: ICD-10-CM

## 2023-08-22 PROCEDURE — 1159F PR MEDICATION LIST DOCUMENTED IN MEDICAL RECORD: ICD-10-PCS | Mod: CPTII,S$GLB,, | Performed by: NURSE PRACTITIONER

## 2023-08-22 PROCEDURE — 99213 OFFICE O/P EST LOW 20 MIN: CPT | Mod: S$GLB,,, | Performed by: NURSE PRACTITIONER

## 2023-08-22 PROCEDURE — 99213 PR OFFICE/OUTPT VISIT, EST, LEVL III, 20-29 MIN: ICD-10-PCS | Mod: S$GLB,,, | Performed by: NURSE PRACTITIONER

## 2023-08-22 PROCEDURE — 3075F SYST BP GE 130 - 139MM HG: CPT | Mod: CPTII,S$GLB,, | Performed by: NURSE PRACTITIONER

## 2023-08-22 PROCEDURE — 1160F PR REVIEW ALL MEDS BY PRESCRIBER/CLIN PHARMACIST DOCUMENTED: ICD-10-PCS | Mod: CPTII,S$GLB,, | Performed by: NURSE PRACTITIONER

## 2023-08-22 PROCEDURE — 3079F DIAST BP 80-89 MM HG: CPT | Mod: CPTII,S$GLB,, | Performed by: NURSE PRACTITIONER

## 2023-08-22 PROCEDURE — 3079F PR MOST RECENT DIASTOLIC BLOOD PRESSURE 80-89 MM HG: ICD-10-PCS | Mod: CPTII,S$GLB,, | Performed by: NURSE PRACTITIONER

## 2023-08-22 PROCEDURE — 3008F PR BODY MASS INDEX (BMI) DOCUMENTED: ICD-10-PCS | Mod: CPTII,S$GLB,, | Performed by: NURSE PRACTITIONER

## 2023-08-22 PROCEDURE — 1160F RVW MEDS BY RX/DR IN RCRD: CPT | Mod: CPTII,S$GLB,, | Performed by: NURSE PRACTITIONER

## 2023-08-22 PROCEDURE — 3075F PR MOST RECENT SYSTOLIC BLOOD PRESS GE 130-139MM HG: ICD-10-PCS | Mod: CPTII,S$GLB,, | Performed by: NURSE PRACTITIONER

## 2023-08-22 PROCEDURE — 1159F MED LIST DOCD IN RCRD: CPT | Mod: CPTII,S$GLB,, | Performed by: NURSE PRACTITIONER

## 2023-08-22 PROCEDURE — 99999 PR PBB SHADOW E&M-EST. PATIENT-LVL IV: CPT | Mod: PBBFAC,,, | Performed by: NURSE PRACTITIONER

## 2023-08-22 PROCEDURE — 3008F BODY MASS INDEX DOCD: CPT | Mod: CPTII,S$GLB,, | Performed by: NURSE PRACTITIONER

## 2023-08-22 PROCEDURE — 99999 PR PBB SHADOW E&M-EST. PATIENT-LVL IV: ICD-10-PCS | Mod: PBBFAC,,, | Performed by: NURSE PRACTITIONER

## 2023-08-22 NOTE — PROGRESS NOTES
Subjective:      Patient ID: Kentrell Aguilera is a 63 y.o. female.    Chief Complaint: Apnea    HPI    Patient presents to the office today for evaluation of sleep apnea.  Patient with snoring. Patient not having problems falling asleep, but wakes up frequently throughout the night.  Patient does not wake up feeling refreshed in the morning.  Patient with daytime hypersomnolence.  [place] Sleepiness Scale score 15.  Patient has had symptoms for a few years. Comorbidities include BMI 38. Weight gain with chronic pain. She is active during the day but can fall asleep anytime she slows down.  Bedtime: 10PM  Wake time: 6AM    STOP - BANG Questionnaire:     1. Snoring : Do you snore loudly ?    Yes    2. Tired : Do you often feel tired, fatigued, or sleepy during daytime?   Yes    3. Observed: Has anyone observed you stop breathing during your sleep?   No    4. Blood pressure : Do you have or are you being treated for high blood pressure?   No    5. BMI :BMI more than 35 kg/m2?   Yes    6. Age : Age over 50 yr old?   Yes    7. Neck circumference: Neck circumference greater than 40 cm?   Yes    8. Gender: Gender male?   No    High risk of RUFUS: Yes 5 - 8  Intermediate risk of RUFUS: Yes 3 - 4  Low risk of RUFUS: Yes 0 - 2      References:   STOP Questionnaire   A Tool to Screen Patients for Obstructive Sleep Apnea: ALLISON Elliott.C.P.C., ANGELICA Romeo.B.B.S., Debi Trevizo M.D.,Traci Ye, Ph.D., Jc Corbin M.B.B.S.,_ ANGELICA Littlejohn.Sc.,_ Fern Dasilva M.D., Rayray Sunshine F.R.C.P.C.; Anesthesiology 2008; 108:812-21 Copyright © 2008, the American Society of Anesthesiologists, Inc. [employer]    Patient Active Problem List   Diagnosis    Right foot pain    Ingrown toenail of right foot    Loss of balance    Ataxia, unspecified    Carpal tunnel syndrome    BMI 36.0-36.9,adult    Granuloma annulare    Vertigo    Tinea versicolor    Seborrheic keratoses    Renal cyst,  "acquired, right    Snores    Insomnia         /82 (BP Location: Right arm, Patient Position: Sitting, BP Method: Large (Manual))   Pulse 82   Resp 18   Ht 5' 7" (1.702 m)   Wt 110.4 kg (243 lb 6.2 oz)   SpO2 95%   BMI 38.12 kg/m²   Body mass index is 38.12 kg/m².    Review of Systems   Constitutional:  Positive for fatigue.   HENT: Negative.     Respiratory:  Positive for snoring and somnolence.    Cardiovascular: Negative.    Musculoskeletal:  Positive for back pain.   Gastrointestinal: Negative.    Neurological: Negative.    Psychiatric/Behavioral:  Positive for sleep disturbance.          Objective:      Physical Exam  Constitutional:       Appearance: She is well-developed. She is obese.   HENT:      Head: Normocephalic and atraumatic.      Mouth/Throat:      Comments: Mallampati Score: IV  Neck:      Comments: Neck circumference: 16.5 inches   Cardiovascular:      Rate and Rhythm: Normal rate and regular rhythm.   Pulmonary:      Effort: Pulmonary effort is normal.      Breath sounds: Normal breath sounds.   Abdominal:      Palpations: Abdomen is soft.   Musculoskeletal:         General: Normal range of motion.      Cervical back: Normal range of motion and neck supple.   Skin:     General: Skin is warm and dry.   Neurological:      Mental Status: She is alert and oriented to person, place, and time.   Psychiatric:         Mood and Affect: Mood normal.         Behavior: Behavior normal.       Personal Diagnostic Review      Assessment:     1. Insomnia, unspecified type    2. Snores    3. Fatigue, unspecified type    4. Sleep disorder breathing    5. Obesity, Class II, BMI 35-39.9       Outpatient Encounter Medications as of 8/22/2023   Medication Sig Dispense Refill    ALPRAZolam (XANAX) 0.25 MG tablet TAKE 1 TABLET BY MOUTH NIGHTLY AS NEEDED FOR ANXIETY 30 tablet 5    hydrocortisone 2.5 % cream APPLY  CREAM TO RASH TWICE DAILY 28 g 5    betamethasone dipropionate (DIPROLENE) 0.05 % ointment Apply " topically 2 (two) times daily. for 10 days 45 g 0    diclofenac (VOLTAREN) 75 MG EC tablet Take 1 tablet (75 mg total) by mouth 2 (two) times daily as needed. (Patient not taking: Reported on 8/22/2023) 60 tablet 1    doxycycline (VIBRA-TABS) 100 MG tablet Take 1 tablet (100 mg total) by mouth 2 (two) times daily. (Patient not taking: Reported on 8/22/2023) 20 tablet 0    meclizine (ANTIVERT) 25 mg tablet Take 1 tablet by mouth three times daily as needed (Patient not taking: Reported on 8/22/2023) 90 tablet 0    ofloxacin (OCUFLOX) 0.3 % ophthalmic solution       PROLENSA 0.07 % Drop SMARTSIG:In Eye(s)      RESTASIS 0.05 % ophthalmic emulsion Place 1 drop into both eyes 2 (two) times daily.       No facility-administered encounter medications on file as of 8/22/2023.     Orders Placed This Encounter   Procedures    Home Sleep Study     Standing Status:   Future     Standing Expiration Date:   8/22/2024     Scheduling Instructions:      2 night protocol     Plan:   Sleep study.     Problem List Items Addressed This Visit          Other    Snores    Insomnia - Primary    Relevant Orders    Home Sleep Study     Other Visit Diagnoses       Fatigue, unspecified type        Sleep disorder breathing        Relevant Orders    Home Sleep Study    Obesity, Class II, BMI 35-39.9             Weight loss       Thank you Dr. Seo for this consultation.

## 2023-08-22 NOTE — TELEPHONE ENCOUNTER
----- Message from Afsaneh Mendoza sent at 8/22/2023  1:24 PM CDT -----  [facility]

## 2023-08-23 NOTE — TELEPHONE ENCOUNTER
Addressed. Patient came to the office. Teams was sent to Karlene Cervantes, Rehab Supervisor per TORI Pierre for a tens unit from a [facility]. Patient is aware and verbalized understanding.

## 2023-08-25 ENCOUNTER — TELEPHONE (OUTPATIENT)
Dept: NEUROSURGERY | Facility: CLINIC | Age: 63
End: 2023-08-25
Payer: COMMERCIAL

## 2023-08-25 NOTE — TELEPHONE ENCOUNTER
I just spoke with the patient pertaining to a TENs machine and she explained to me her significant other picked one up from the [place] location a few hr ago.but she waiting for a phone call on Monday to get direction on how the machine works.

## 2023-08-28 ENCOUNTER — TELEPHONE (OUTPATIENT)
Dept: NEUROSURGERY | Facility: CLINIC | Age: 63
End: 2023-08-28
Payer: COMMERCIAL

## 2023-08-28 NOTE — TELEPHONE ENCOUNTER
----- Message from Sabrina Pham sent at 8/22/2023 12:10 PM CDT -----  Contact: Pt [phone]  Would like to receive medical advice.    Would they like a call back or a response via [facility]:  call back     Additional information:  Pt is calling to see if Tens machine is ready to be picked up.  Please call to advise.

## 2023-09-11 ENCOUNTER — HOSPITAL ENCOUNTER (OUTPATIENT)
Dept: SLEEP MEDICINE | Facility: HOSPITAL | Age: 63
Discharge: HOME | End: 2023-09-11
Attending: NURSE PRACTITIONER
Payer: COMMERCIAL

## 2023-09-11 DIAGNOSIS — G47.33 OSA (OBSTRUCTIVE SLEEP APNEA): Primary | ICD-10-CM

## 2023-09-11 DIAGNOSIS — G47.30 SLEEP DISORDER BREATHING: ICD-10-CM

## 2023-09-11 DIAGNOSIS — G47.00 INSOMNIA, UNSPECIFIED TYPE: ICD-10-CM

## 2023-09-11 PROCEDURE — 95806 SLEEP STUDY UNATT&RESP EFFT: CPT

## 2023-09-11 NOTE — Clinical Note
2 night study SEVERE OBSTRUCTIVE SLEEP APNEA with overall AHI 35.3/hr (167 events): night # 2 Oxygen desaturation: < 70 %. SpO2 between 70% to 79% for 1 min. SpO2 was below 90% saturation for 13% study Patient snored 100 % time above 50 . Heart rate range: 61 bpm - 91bpm REC's: Therapy with APAP at 6-20 cm WP using mask of choice with heated humidification is an option. Consider inlab CPAP titration Please refer to sleep disorders clinic Weight loss/management. with regular exercise per direction of physician. Avoid drowsy driving. Follow up in sleep clinic to maximize adherence and ensure resolution of symptoms.

## 2023-09-12 ENCOUNTER — PATIENT MESSAGE (OUTPATIENT)
Dept: SLEEP MEDICINE | Facility: CLINIC | Age: 63
End: 2023-09-12
Payer: COMMERCIAL

## 2023-09-12 PROCEDURE — 95806 PR SLEEP STUDY, UNATTENDED, SIMUL RECORD HR/O2 SAT/RESP FLOW/RESP EFFT: ICD-10-PCS | Mod: 26,52,, | Performed by: INTERNAL MEDICINE

## 2023-09-12 PROCEDURE — 95806 SLEEP STUDY UNATT&RESP EFFT: CPT | Mod: 26,52,, | Performed by: INTERNAL MEDICINE

## 2023-09-12 NOTE — PROCEDURES
"2 night study  SEVERE OBSTRUCTIVE SLEEP APNEA with overall AHI 35.3/hr (167 events):  night # 2  Oxygen desaturation: < 70 %. SpO2 between 70% to 79% for 1 min.  SpO2 was below 90% saturation for 13% study  Patient snored 100 % time above 50 .  Heart rate range: 61 bpm - 91bpm  REC's:  Therapy with APAP at 6-20 cm WP using mask of choice with heated humidification is an option.  Consider inlab CPAP titration  Please refer to sleep disorders clinic  Weight loss/management. with regular exercise per direction of physician.  Avoid drowsy driving.  Follow up in sleep clinic to maximize adherence and ensure resolution of symptoms.    [place]  [id number] [facility]  [place] [id number]/Rayray Mann MD         The sleep study that you ordered is complete.  You have ordered sleep LAB services to perform the sleep study for Kentrell Aguilera .      Please find Sleep Study result in  the "Media tab" of Chart Review menu.        You can look  for the report in the  Media by the document type "Sleep Study Documents". Alphabetizing  "Document type" column helps to find the SLEEP STUDY report  Faster.       As the ordering provider, you are responsible for reviewing the results and implementing a treatment plan with your patient.    If you need a Sleep Medicine provider to explain the sleep study findings and arrange treatment for the patient, please refer patient for consultation to our Sleep Clinic via [facility] with Ambulatory Consult Sleep.     To do that please place an order for an  "Ambulatory Consult Sleep" -  order , it will go to our clinic work queue for our staff  to contact the patient for an appointment.      For any questions, please contact our sleep lab  staff at [phone] to talk to clinical staff          Jass Chamorro MD    "

## 2023-09-13 ENCOUNTER — OFFICE VISIT (OUTPATIENT)
Dept: SLEEP MEDICINE | Facility: CLINIC | Age: 63
End: 2023-09-13
Payer: COMMERCIAL

## 2023-09-13 VITALS — BODY MASS INDEX: 37.9 KG/M2 | WEIGHT: 242 LBS

## 2023-09-13 DIAGNOSIS — E66.9 OBESITY, CLASS II, BMI 35-39.9: ICD-10-CM

## 2023-09-13 DIAGNOSIS — R53.83 FATIGUE, UNSPECIFIED TYPE: ICD-10-CM

## 2023-09-13 DIAGNOSIS — G47.33 OSA (OBSTRUCTIVE SLEEP APNEA): Primary | ICD-10-CM

## 2023-09-13 DIAGNOSIS — G47.00 INSOMNIA, UNSPECIFIED TYPE: ICD-10-CM

## 2023-09-13 PROCEDURE — 99213 PR OFFICE/OUTPT VISIT, EST, LEVL III, 20-29 MIN: ICD-10-PCS | Mod: 95,,, | Performed by: NURSE PRACTITIONER

## 2023-09-13 PROCEDURE — 99213 OFFICE O/P EST LOW 20 MIN: CPT | Mod: 95,,, | Performed by: NURSE PRACTITIONER

## 2023-09-13 PROCEDURE — 1159F MED LIST DOCD IN RCRD: CPT | Mod: CPTII,95,, | Performed by: NURSE PRACTITIONER

## 2023-09-13 PROCEDURE — 1160F RVW MEDS BY RX/DR IN RCRD: CPT | Mod: CPTII,95,, | Performed by: NURSE PRACTITIONER

## 2023-09-13 PROCEDURE — 3008F PR BODY MASS INDEX (BMI) DOCUMENTED: ICD-10-PCS | Mod: CPTII,95,, | Performed by: NURSE PRACTITIONER

## 2023-09-13 PROCEDURE — 1159F PR MEDICATION LIST DOCUMENTED IN MEDICAL RECORD: ICD-10-PCS | Mod: CPTII,95,, | Performed by: NURSE PRACTITIONER

## 2023-09-13 PROCEDURE — 3008F BODY MASS INDEX DOCD: CPT | Mod: CPTII,95,, | Performed by: NURSE PRACTITIONER

## 2023-09-13 PROCEDURE — 1160F PR REVIEW ALL MEDS BY PRESCRIBER/CLIN PHARMACIST DOCUMENTED: ICD-10-PCS | Mod: CPTII,95,, | Performed by: NURSE PRACTITIONER

## 2023-09-13 NOTE — PROGRESS NOTES
Subjective:      Patient ID: Kentrell Aguilera is a 63 y.o. female.    Chief Complaint: Sleep Apnea (Rev slp study)  The patient location is: [place]  The chief complaint leading to consultation is: sleepiness  Visit type: Virtual visit with synchronous audio and video  Total time spent with patient: 15 min   Each patient to whom he or she provides medical services by telemedicine is:  (1) informed of the relationship between the physician and patient and the respective role of any other health care provider with respect to management of the patient; and (2) notified that he or she may decline to receive medical services by telemedicine and may withdraw from such care at any time.    HPI    Patient presents to the office today for evaluation of sleep apnea.  Patient with snoring. Patient not having problems falling asleep, but wakes up frequently throughout the night.  Patient does not wake up feeling refreshed in the morning.  Patient with daytime hypersomnolence.  [place] Sleepiness Scale score 16.  Patient has had symptoms for a few years. Comorbidities include BMI 38. Weight gain with chronic pain. She is active during the day but can fall asleep anytime she slows down.  Bedtime: 10PM  Wake time: 6AM  She had sleep study.     Patient Active Problem List   Diagnosis    Right foot pain    Ingrown toenail of right foot    Loss of balance    Ataxia, unspecified    Carpal tunnel syndrome    BMI 36.0-36.9,adult    Granuloma annulare    Vertigo    Tinea versicolor    Seborrheic keratoses    Renal cyst, acquired, right    Snores    Insomnia    RUFUS (obstructive sleep apnea)         Wt 109.8 kg (242 lb)   BMI 37.90 kg/m²   Body mass index is 37.9 kg/m².    Review of Systems   Constitutional:  Positive for fatigue.   HENT: Negative.     Respiratory:  Positive for snoring and somnolence.    Cardiovascular: Negative.    Musculoskeletal:  Positive for back pain.   Gastrointestinal: Negative.    Neurological: Negative.     Psychiatric/Behavioral:  Positive for sleep disturbance.          Objective:      Physical Exam  Constitutional:       Appearance: She is well-developed. She is obese.   HENT:      Head: Normocephalic.      Nose: Nose normal.      Mouth/Throat:      Comments: Mallampati Score: IV  Cardiovascular:      Rate and Rhythm: Normal rate and regular rhythm.   Pulmonary:      Effort: Pulmonary effort is normal.   Neurological:      Mental Status: She is alert and oriented to person, place, and time.   Psychiatric:         Mood and Affect: Mood normal.         Behavior: Behavior normal.       Personal Diagnostic Review  Procedure Notes    Author Status Last  Updated Created   Jass Chamorro MD Signed Jass Chamorro MD 9/12/2023  5:18 PM 9/12/2023  5:18 PM          Assoc. Orders Procedures   HOME SLEEP STUDIES HOME SLEEP STUDIES       Pre-Op Dx Post-Op Dx   Insomnia, unspecified type; Sleep disorder breathing None         2 night study  SEVERE OBSTRUCTIVE SLEEP APNEA with overall AHI 35.3/hr (167 events):  night # 2  Oxygen desaturation: < 70 %. SpO2 between 70% to 79% for 1 min.  SpO2 was below 90% saturation for 13% study  Patient snored 100 % time above 50 .  Heart rate range: 61 bpm - 91bpm  REC's:  Therapy with APAP at 6-20 cm WP using mask of choice with heated humidification is an option.  Consider inlab CPAP titration  Please refer to sleep disorders clinic  Weight loss/management. with regular exercise per direction of physician.  Avoid drowsy driving.  Follow up in sleep clinic to maximize adherence and ensure resolution of symptoms          Assessment:     1. RUFUS (obstructive sleep apnea)    2. Fatigue, unspecified type    3. Insomnia, unspecified type    4. Obesity, Class II, BMI 35-39.9       Outpatient Encounter Medications as of 9/13/2023   Medication Sig Dispense Refill    ALPRAZolam (XANAX) 0.25 MG tablet TAKE 1 TABLET BY MOUTH NIGHTLY AS NEEDED FOR ANXIETY 30 tablet 5    betamethasone  dipropionate (DIPROLENE) 0.05 % ointment Apply topically 2 (two) times daily. for 10 days 45 g 0    diclofenac (VOLTAREN) 75 MG EC tablet Take 1 tablet (75 mg total) by mouth 2 (two) times daily as needed. (Patient not taking: Reported on 9/13/2023) 60 tablet 1    doxycycline (VIBRA-TABS) 100 MG tablet Take 1 tablet (100 mg total) by mouth 2 (two) times daily. (Patient not taking: Reported on 8/22/2023) 20 tablet 0    hydrocortisone 2.5 % cream APPLY  CREAM TO RASH TWICE DAILY (Patient not taking: Reported on 9/13/2023) 28 g 5    meclizine (ANTIVERT) 25 mg tablet Take 1 tablet by mouth three times daily as needed (Patient not taking: Reported on 8/22/2023) 90 tablet 0    ofloxacin (OCUFLOX) 0.3 % ophthalmic solution       PROLENSA 0.07 % Drop SMARTSIG:In Eye(s)      RESTASIS 0.05 % ophthalmic emulsion Place 1 drop into both eyes 2 (two) times daily.       No facility-administered encounter medications on file as of 9/13/2023.     Orders Placed This Encounter   Procedures    CPAP FOR HOME USE     Order Specific Question:   Length of need (1-99 months):     Answer:   99     Order Specific Question:   Type ():     Answer:   Auto CPAP     Order Specific Question:   Auto CPAP pressure setting range (cmH20):     Answer:   5-16     Order Specific Question:   Fulfillment Priority:     Answer:   Level 2:   AHI 30  to < 59 associated with systolic congestive heart failure, moderate-severe COPD, moderate-severe neuromuscular disease, HTN consistently > 140/90 despite at least 2 anti-hypertensives that patient is compliant with, atrial fibrillation     Order Specific Question:   Humidification ():     Answer:   Heated     Order Specific Question:   Choose ONE mask type and its corresponding cushions and/or pillows:     Answer:    Nasal Mask, 1 per 90 days:  Nasal Cushions, (6 per 90 days):  Nasal Pillows, (6 per 90 days)     Order Specific Question:   Choose EITHER Heated or Non-Heated Tubjing      Answer:    Non-Heated Tubing, 1 per 90 days     Order Specific Question:   All other supplies as needed as listed below:     Answer:    Headgear, 1 per 180 days     Order Specific Question:   All other supplies as needed as listed below:     Answer:    Disposable Filter, 6 per 90 days     Order Specific Question:   All other supplies as needed as listed below:     Answer:    Non-Disposable Filter, 1 per 180 days     Order Specific Question:   All other supplies as needed as listed below:     Answer:    Humidifier Chamber, 1 per 180 days     Order Specific Question:   All other supplies as needed as listed below:     Answer:    Chin Strap, 1 per 180 days     Plan:   Sleep study positive for severe RUFUS- APAP and follo wup 10 weeks.     Problem List Items Addressed This Visit          Other    Insomnia    RUFUS (obstructive sleep apnea) - Primary    Relevant Orders    CPAP FOR HOME USE     Other Visit Diagnoses       Fatigue, unspecified type        Obesity, Class II, BMI 35-39.9             Weight loss

## 2023-09-26 ENCOUNTER — PATIENT MESSAGE (OUTPATIENT)
Dept: SLEEP MEDICINE | Facility: CLINIC | Age: 63
End: 2023-09-26
Payer: COMMERCIAL

## 2023-10-19 ENCOUNTER — PATIENT MESSAGE (OUTPATIENT)
Dept: PAIN MEDICINE | Facility: HOSPITAL | Age: 63
End: 2023-10-19
Payer: COMMERCIAL

## 2023-10-24 ENCOUNTER — TELEPHONE (OUTPATIENT)
Dept: FAMILY MEDICINE | Facility: CLINIC | Age: 63
End: 2023-10-24
Payer: COMMERCIAL

## 2023-11-01 ENCOUNTER — PATIENT MESSAGE (OUTPATIENT)
Dept: SLEEP MEDICINE | Facility: CLINIC | Age: 63
End: 2023-11-01
Payer: COMMERCIAL

## 2023-11-06 ENCOUNTER — PATIENT MESSAGE (OUTPATIENT)
Dept: PAIN MEDICINE | Facility: HOSPITAL | Age: 63
End: 2023-11-06
Payer: COMMERCIAL

## 2023-11-06 NOTE — PRE-PROCEDURE INSTRUCTIONS
Spoke with patient regarding procedure scheduled on 11.13     Arrival time 0915     Has patient been sick with fever or on antibiotics within the last 7 days? No     Does the patient have any open wounds, sores or rashes? No     Does the patient have any recent fractures? no     Has patient received a vaccination within the last 7 days? No     Received the COVID vaccination? yes     Has the patient stopped all medications as directed? na     Does patient have a pacemaker, defibrillator, or implantable stimulator? No     Does the patient have a ride to and from procedure and someone reliable to remain with patient?  FRIEND     Is the patient diabetic? no     Does the patient have sleep apnea? Or use O2 at home? tessie      Is the patient receiving sedation? yes     Is the patient instructed to remain NPO beginning at midnight the night before their procedure? yes     Procedure location confirmed with patient? Yes     Covid- Denies signs/symptoms. Instructed to notify PAT/MD if any changes.

## 2023-11-13 ENCOUNTER — HOSPITAL ENCOUNTER (OUTPATIENT)
Facility: HOSPITAL | Age: 63
Discharge: HOME | End: 2023-11-13
Attending: ANESTHESIOLOGY | Admitting: ANESTHESIOLOGY
Payer: COMMERCIAL

## 2023-11-13 VITALS
HEART RATE: 64 BPM | TEMPERATURE: 97 F | DIASTOLIC BLOOD PRESSURE: 57 MMHG | RESPIRATION RATE: 16 BRPM | OXYGEN SATURATION: 96 % | WEIGHT: 245.94 LBS | SYSTOLIC BLOOD PRESSURE: 137 MMHG | BODY MASS INDEX: 38.6 KG/M2 | HEIGHT: 67 IN

## 2023-11-13 DIAGNOSIS — M47.816 LUMBAR SPONDYLOSIS: Primary | ICD-10-CM

## 2023-11-13 PROCEDURE — 64494 PR INJ DX/THER AGNT PARAVERT FACET JOINT,IMG GUIDE,LUMBAR/SAC, 2ND LEVEL: ICD-10-PCS | Mod: 50,,, | Performed by: ANESTHESIOLOGY

## 2023-11-13 PROCEDURE — 64493 PR INJ DX/THER AGNT PARAVERT FACET JOINT,IMG GUIDE,LUMBAR/SAC,1ST LVL: ICD-10-PCS | Mod: 50,,, | Performed by: ANESTHESIOLOGY

## 2023-11-13 PROCEDURE — 25000003 PHARM REV CODE 250: Performed by: ANESTHESIOLOGY

## 2023-11-13 PROCEDURE — 25500020 PHARM REV CODE 255: Performed by: ANESTHESIOLOGY

## 2023-11-13 PROCEDURE — 64493 INJ PARAVERT F JNT L/S 1 LEV: CPT | Mod: 50,,, | Performed by: ANESTHESIOLOGY

## 2023-11-13 PROCEDURE — 64494 INJ PARAVERT F JNT L/S 2 LEV: CPT | Mod: LT | Performed by: ANESTHESIOLOGY

## 2023-11-13 PROCEDURE — 63600175 PHARM REV CODE 636 W HCPCS: Performed by: ANESTHESIOLOGY

## 2023-11-13 PROCEDURE — 64494 INJ PARAVERT F JNT L/S 2 LEV: CPT | Mod: 50,,, | Performed by: ANESTHESIOLOGY

## 2023-11-13 PROCEDURE — 64493 INJ PARAVERT F JNT L/S 1 LEV: CPT | Mod: RT | Performed by: ANESTHESIOLOGY

## 2023-11-13 RX ORDER — METHYLPREDNISOLONE ACETATE 40 MG/ML
INJECTION, SUSPENSION INTRA-ARTICULAR; INTRALESIONAL; INTRAMUSCULAR; SOFT TISSUE
Status: DISCONTINUED | OUTPATIENT
Start: 2023-11-13 | End: 2023-11-13 | Stop reason: HOSPADM

## 2023-11-13 RX ORDER — ONDANSETRON 2 MG/ML
4 INJECTION INTRAMUSCULAR; INTRAVENOUS ONCE AS NEEDED
Status: DISCONTINUED | OUTPATIENT
Start: 2023-11-13 | End: 2023-11-13 | Stop reason: HOSPADM

## 2023-11-13 RX ORDER — MIDAZOLAM HYDROCHLORIDE 1 MG/ML
INJECTION, SOLUTION INTRAMUSCULAR; INTRAVENOUS
Status: DISCONTINUED | OUTPATIENT
Start: 2023-11-13 | End: 2023-11-13 | Stop reason: HOSPADM

## 2023-11-13 RX ORDER — LIDOCAINE HYDROCHLORIDE 10 MG/ML
INJECTION, SOLUTION EPIDURAL; INFILTRATION; INTRACAUDAL; PERINEURAL
Status: DISCONTINUED | OUTPATIENT
Start: 2023-11-13 | End: 2023-11-13 | Stop reason: HOSPADM

## 2023-11-13 RX ORDER — FENTANYL CITRATE 50 UG/ML
INJECTION, SOLUTION INTRAMUSCULAR; INTRAVENOUS
Status: DISCONTINUED | OUTPATIENT
Start: 2023-11-13 | End: 2023-11-13 | Stop reason: HOSPADM

## 2023-11-13 NOTE — DISCHARGE INSTRUCTIONS

## 2023-11-13 NOTE — OP NOTE
Lumbar Facet Joint Injection Under Fluoroscopy    INFORMED CONSENT: The procedure, risks, benefits and options were discussed with patient. There are no contraindications to the procedure. The patient expressed understanding and agreed to proceed. The personnel performing the procedure was discussed.    Date of procedure 11/13/2023    Time-out taken to identify patient and procedure side prior to starting the procedure.                                                                       PROCEDURE:  bilateral facet joint injection at L4/L5 and L5/S1 under fluoroscopy.    REASON FOR PROCEDURE: Lumbar spondylosis [M47.816]  1. Lumbar spondylosis      POSTOP DIAGNOSIS: Lumbar spondylosis [M47.816]  1. Lumbar spondylosis        PHYSICIAN: Salinas Jennings MD    ASSISTANTS: none    MEDICATIONS INJECTED:  1ml Depomedrol 40mg/ml and 3ml of Lidocaine PF 1%, split evenly between injection sites    LOCAL ANESTHETIC USED:   1ml Lidocaine PF 1%, at each level      Sedation: Conscious sedation provided by MFAVIAN    SEDATION MEDICATIONS: local/IV sedation: Versed 2 mg and fentanyl 100 mcg IV.  Conscious sedation ordered by MD.  Patient reevaluated and sedation administered by MD and monitored by RN.  Total sedation time was less than 15 min.    ESTIMATED BLOOD LOSS:  None.    COMPLICATIONS:  None.    TECHNIQUE:   Lying in a prone position, the patient was prepped and draped in the usual sterile fashion using ChloraPrep and fenestrated drape.  The level was determined under fluoroscopic guidance.  Local anesthetic was given by going down to the hub of the 27-gauge 1.25in needle and raising a wheel.  The 3.5in 22-gauge needle was introduced into all levels as stated above facet joints.  Negative pressure applied to make sure that there was no intravascular placement.  Omnipaque was injected to confirm placement.  It was also done to confirm that there was no vascular runoff.  Medication was then injected slowly.  The patient  tolerated the procedure well.      The patient was monitored after the procedure.  Patient was given post procedure and discharge instructions to follow at home.  We will see the patient back in two weeks or the patient may call to inform of status. The patient was discharged in a stable condition

## 2023-11-13 NOTE — H&P
HPI  Patient presenting for Procedure(s) (LRB):  Bilateral L4/L5 and L5/S1 Facet Injection (Bilateral)     Patient on Anti-coagulation No    No health changes since previous encounter    History reviewed. No pertinent past medical history.  Past Surgical History:   Procedure Laterality Date    BREAST BIOPSY Left     negative 2015 negative    INJECTION OF ANESTHETIC AGENT AROUND MEDIAL BRANCH NERVES INNERVATING LUMBAR FACET JOINT Bilateral 12/19/2022    Procedure: Bilateral L3-5 MBB;  Surgeon: Salinas Jennings MD;  Location: [facility];  Service: Pain Management;  Laterality: Bilateral;    INJECTION OF ANESTHETIC AGENT AROUND MEDIAL BRANCH NERVES INNERVATING LUMBAR FACET JOINT Bilateral 1/9/2023    Procedure: Bilateral L3-5 MBB;  Surgeon: Salinas Jennings MD;  Location: [facility];  Service: Pain Management;  Laterality: Bilateral;    INJECTION OF ANESTHETIC AGENT AROUND MEDIAL BRANCH NERVES INNERVATING LUMBAR FACET JOINT Bilateral 4/27/2023    Procedure: Bilateral L4/L5 and L5/S1 Facet Injection;  Surgeon: Salinas Jennings MD;  Location: [facility];  Service: Pain Management;  Laterality: Bilateral;    RADIOFREQUENCY THERMOCOAGULATION Bilateral 1/30/2023    Procedure: Bilateral L3-5 Lumbar RFA;  Surgeon: Salinas Jennings MD;  Location: [facility];  Service: Pain Management;  Laterality: Bilateral;    SELECTIVE INJECTION OF ANESTHETIC AGENT AROUND LUMBAR SPINAL NERVE ROOT BY TRANSFORAMINAL APPROACH Bilateral 11/9/2022    Procedure: Bilateral L5/S1 TF LAUREN;  Surgeon: Salinas Jennings MD;  Location: [facility];  Service: Pain Management;  Laterality: Bilateral;     Review of patient's allergies indicates:   Allergen Reactions    Erythromycin     Pcn [penicillins]         No current facility-administered medications on file prior to encounter.     Current Outpatient Medications on File Prior to Encounter   Medication Sig Dispense Refill    ALPRAZolam (XANAX) 0.25 MG tablet TAKE 1 TABLET BY MOUTH  "NIGHTLY AS NEEDED FOR ANXIETY 30 tablet 5    betamethasone dipropionate (DIPROLENE) 0.05 % ointment Apply topically 2 (two) times daily. for 10 days 45 g 0    diclofenac (VOLTAREN) 75 MG EC tablet Take 1 tablet (75 mg total) by mouth 2 (two) times daily as needed. (Patient not taking: Reported on 9/13/2023) 60 tablet 1    doxycycline (VIBRA-TABS) 100 MG tablet Take 1 tablet (100 mg total) by mouth 2 (two) times daily. (Patient not taking: Reported on 8/22/2023) 20 tablet 0    hydrocortisone 2.5 % cream APPLY  CREAM TO RASH TWICE DAILY (Patient not taking: Reported on 9/13/2023) 28 g 5    meclizine (ANTIVERT) 25 mg tablet Take 1 tablet by mouth three times daily as needed (Patient not taking: Reported on 8/22/2023) 90 tablet 0    ofloxacin (OCUFLOX) 0.3 % ophthalmic solution       PROLENSA 0.07 % Drop SMARTSIG:In Eye(s)      RESTASIS 0.05 % ophthalmic emulsion Place 1 drop into both eyes 2 (two) times daily.          PMHx, PSHx, Allergies, Medications reviewed in epic    ROS negative except pain complaints in HPI    OBJECTIVE:    BP (!) 169/80 (BP Location: Right arm, Patient Position: Sitting)   Pulse 67   Temp 97.3 °F (36.3 °C) (Temporal)   Resp 16   Ht 5' 7" (1.702 m)   Wt 111.5 kg (245 lb 14.8 oz)   SpO2 97%   Breastfeeding No   BMI 38.52 kg/m²     PHYSICAL EXAMINATION:    GENERAL: Well appearing, in no acute distress, alert and oriented x3.  PSYCH:  Mood and affect appropriate.  SKIN: Skin color, texture, turgor normal, no rashes or lesions which will impact the procedure.  CV: RRR with palpation of the radial artery.  PULM: No evidence of respiratory difficulty, symmetric chest rise. Clear to auscultation.  NEURO: Cranial nerves grossly intact.    Plan:    Proceed with procedure as planned Procedure(s) (LRB):  Bilateral L4/L5 and L5/S1 Facet Injection (Bilateral)    Salinas Jennings MD  11/13/2023            "

## 2023-11-13 NOTE — DISCHARGE SUMMARY
Discharge Note  Short Stay      SUMMARY     Admit Date: 11/13/2023    Attending Physician: Salinas Jennings MD        Discharge Physician: Salinas Jennings MD        Discharge Date: 11/13/2023 10:21 AM    Procedure(s) (LRB):  Bilateral L4/L5 and L5/S1 Facet Injection (Bilateral)    Final Diagnosis: Lumbar spondylosis [M47.816]    Disposition: Home or self care    Patient Instructions:   Current Discharge Medication List        CONTINUE these medications which have NOT CHANGED    Details   ALPRAZolam (XANAX) 0.25 MG tablet TAKE 1 TABLET BY MOUTH NIGHTLY AS NEEDED FOR ANXIETY  Qty: 30 tablet, Refills: 5      betamethasone dipropionate (DIPROLENE) 0.05 % ointment Apply topically 2 (two) times daily. for 10 days  Qty: 45 g, Refills: 0      diclofenac (VOLTAREN) 75 MG EC tablet Take 1 tablet (75 mg total) by mouth 2 (two) times daily as needed.  Qty: 60 tablet, Refills: 1      doxycycline (VIBRA-TABS) 100 MG tablet Take 1 tablet (100 mg total) by mouth 2 (two) times daily.  Qty: 20 tablet, Refills: 0      hydrocortisone 2.5 % cream APPLY  CREAM TO RASH TWICE DAILY  Qty: 28 g, Refills: 5      meclizine (ANTIVERT) 25 mg tablet Take 1 tablet by mouth three times daily as needed  Qty: 90 tablet, Refills: 0      ofloxacin (OCUFLOX) 0.3 % ophthalmic solution       PROLENSA 0.07 % Drop SMARTSIG:In Eye(s)      RESTASIS 0.05 % ophthalmic emulsion Place 1 drop into both eyes 2 (two) times daily.                 Discharge Diagnosis: Lumbar spondylosis [M47.816]  Condition on Discharge: Stable with no complications to procedure   Diet on Discharge: Same as before.  Activity: as per instruction sheet.  Discharge to: Home with a responsible adult.  Follow up: 2-4 weeks       Please call the office at [phone] if you experience any weakness or loss of sensation, fever > 101.5, pain uncontrolled with oral medications, persistent nausea/vomiting/or diarrhea, redness or drainage from the incisions, or any other worrisome concerns.  If physician on call was not reached or could not communicate with our office for any reason please go to the nearest emergency department

## 2023-11-16 ENCOUNTER — TELEPHONE (OUTPATIENT)
Dept: FAMILY MEDICINE | Facility: CLINIC | Age: 63
End: 2023-11-16
Payer: COMMERCIAL

## 2023-11-16 RX ORDER — SEMAGLUTIDE 0.25 MG/.5ML
0.25 INJECTION, SOLUTION SUBCUTANEOUS
Qty: 4 EACH | Refills: 11 | Status: SHIPPED | OUTPATIENT
Start: 2023-11-16 | End: 2024-01-19

## 2023-11-20 ENCOUNTER — PATIENT MESSAGE (OUTPATIENT)
Dept: PAIN MEDICINE | Facility: CLINIC | Age: 63
End: 2023-11-20
Payer: COMMERCIAL

## 2023-12-11 ENCOUNTER — PATIENT MESSAGE (OUTPATIENT)
Dept: SLEEP MEDICINE | Facility: CLINIC | Age: 63
End: 2023-12-11
Payer: COMMERCIAL

## 2023-12-13 ENCOUNTER — OFFICE VISIT (OUTPATIENT)
Dept: SLEEP MEDICINE | Facility: CLINIC | Age: 63
End: 2023-12-13
Payer: COMMERCIAL

## 2023-12-13 VITALS — BODY MASS INDEX: 37.59 KG/M2 | WEIGHT: 240 LBS

## 2023-12-13 DIAGNOSIS — G47.33 OSA (OBSTRUCTIVE SLEEP APNEA): Primary | ICD-10-CM

## 2023-12-13 DIAGNOSIS — R53.83 FATIGUE, UNSPECIFIED TYPE: ICD-10-CM

## 2023-12-13 DIAGNOSIS — G47.00 INSOMNIA, UNSPECIFIED TYPE: ICD-10-CM

## 2023-12-13 DIAGNOSIS — E66.9 OBESITY, CLASS II, BMI 35-39.9: ICD-10-CM

## 2023-12-13 PROCEDURE — 3008F BODY MASS INDEX DOCD: CPT | Mod: CPTII,95,, | Performed by: NURSE PRACTITIONER

## 2023-12-13 PROCEDURE — 3008F PR BODY MASS INDEX (BMI) DOCUMENTED: ICD-10-PCS | Mod: CPTII,95,, | Performed by: NURSE PRACTITIONER

## 2023-12-13 PROCEDURE — 99213 OFFICE O/P EST LOW 20 MIN: CPT | Mod: 95,,, | Performed by: NURSE PRACTITIONER

## 2023-12-13 PROCEDURE — 99213 PR OFFICE/OUTPT VISIT, EST, LEVL III, 20-29 MIN: ICD-10-PCS | Mod: 95,,, | Performed by: NURSE PRACTITIONER

## 2023-12-13 PROCEDURE — 1159F MED LIST DOCD IN RCRD: CPT | Mod: CPTII,95,, | Performed by: NURSE PRACTITIONER

## 2023-12-13 PROCEDURE — 1159F PR MEDICATION LIST DOCUMENTED IN MEDICAL RECORD: ICD-10-PCS | Mod: CPTII,95,, | Performed by: NURSE PRACTITIONER

## 2023-12-13 NOTE — PROGRESS NOTES
Subjective:      Patient ID: Kentrell Aguilera is a 63 y.o. female.    Chief Complaint: Sleep Apnea  The patient location is: [place]  The chief complaint leading to consultation is: sleep apnea   Visit type: Virtual visit with synchronous audio and video  Total time spent with patient: 11 min   Each patient to whom he or she provides medical services by telemedicine is:  (1) informed of the relationship between the physician and patient and the respective role of any other health care provider with respect to management of the patient; and (2) notified that he or she may decline to receive medical services by telemedicine and may withdraw from such care at any time.    HPI    Presents to office for review of AutoPAP therapy. Patient states improved symptoms with use of AutoPAP. Sleeping more soundly. Waking up feeling more refreshed. Improved daytime sleepiness. Patient states she is benefiting from use of the AutoPAP.    BMI 37      Patient Active Problem List   Diagnosis    Right foot pain    Ingrown toenail of right foot    Loss of balance    Ataxia, unspecified    Carpal tunnel syndrome    BMI 36.0-36.9,adult    Granuloma annulare    Vertigo    Tinea versicolor    Seborrheic keratoses    Renal cyst, acquired, right    Snores    Insomnia    RUFUS (obstructive sleep apnea)     Wt 108.9 kg (240 lb)   BMI 37.59 kg/m²   Body mass index is 37.59 kg/m².    Review of Systems   Constitutional: Negative.    HENT: Negative.     Respiratory: Negative.     Cardiovascular: Negative.    Musculoskeletal: Negative.    Gastrointestinal: Negative.    Neurological: Negative.    Psychiatric/Behavioral: Negative.       Objective:      Physical Exam  Constitutional:       Appearance: She is well-developed.   HENT:      Head: Normocephalic and atraumatic.   Pulmonary:      Effort: Pulmonary effort is normal. No tachypnea, bradypnea, accessory muscle usage or respiratory distress.   Musculoskeletal:      Cervical back: Normal range of  motion.   Skin:     Findings: No rash.   Neurological:      Mental Status: She is alert and oriented to person, place, and time.   Psychiatric:         Behavior: Behavior normal.         Thought Content: Thought content normal.         Judgment: Judgment normal.       Personal Diagnostic Review  Compliance Report  Compliance  Payor Standard  Usage 11/08/2023 - 12/07/2023  Usage days 30/30 days (100%)  >= 4 hours 30 days (100%)  < 4 hours 0 days (0%)  Usage hours 230 hours 32 minutes  Average usage (total days) 7 hours 41 minutes  Average usage (days used) 7 hours 41 minutes  Median usage (days used) 7 hours 38 minutes  Total used hours (value since last reset - 12/07/2023) 349 hours  AirSense 10 AutoSet  Serial number [id number]  Mode AutoSet  Min Pressure 5 cmH2O  Max Pressure 16 cmH2O  EPR Fulltime  EPR level 3  Response Standard  Therapy  Pressure - cmH2O Median: 8.1 95th percentile: 10.7 Maximum: 12.1  Leaks - L/min Median: 0.0 95th percentile: 2.1 Maximum: 10.4  Events per hour AI: 2.1 HI: 0.2 AHI: 2.3  Apnea Index Central: 0.1 Obstructive: 1.9 Unknown: 0.1  RERA Index 0.0  Cheyne-Garza respiration (average duration per night) 0 minutes (0%)      Assessment:       1. RUFUS (obstructive sleep apnea)    2. Fatigue, unspecified type    3. Insomnia, unspecified type    4. Obesity, Class II, BMI 35-39.9        Outpatient Encounter Medications as of 12/13/2023   Medication Sig Dispense Refill    ALPRAZolam (XANAX) 0.25 MG tablet TAKE 1 TABLET BY MOUTH NIGHTLY AS NEEDED FOR ANXIETY 30 tablet 5    PROLENSA 0.07 % Drop SMARTSIG:In Eye(s)      RESTASIS 0.05 % ophthalmic emulsion Place 1 drop into both eyes 2 (two) times daily.      semaglutide, weight loss, (WEGOVY) 0.25 mg/0.5 mL PnIj Inject 0.25 mg into the skin every 7 days. For weight loss 4 each 11    betamethasone dipropionate (DIPROLENE) 0.05 % ointment Apply topically 2 (two) times daily. for 10 days 45 g 0    diclofenac (VOLTAREN) 75 MG EC tablet Take 1 tablet  (75 mg total) by mouth 2 (two) times daily as needed. (Patient not taking: Reported on 9/13/2023) 60 tablet 1    doxycycline (VIBRA-TABS) 100 MG tablet Take 1 tablet (100 mg total) by mouth 2 (two) times daily. (Patient not taking: Reported on 8/22/2023) 20 tablet 0    hydrocortisone 2.5 % cream APPLY  CREAM TO RASH TWICE DAILY (Patient not taking: Reported on 9/13/2023) 28 g 5    meclizine (ANTIVERT) 25 mg tablet Take 1 tablet by mouth three times daily as needed (Patient not taking: Reported on 8/22/2023) 90 tablet 0    ofloxacin (OCUFLOX) 0.3 % ophthalmic solution        No facility-administered encounter medications on file as of 12/13/2023.     No orders of the defined types were placed in this encounter.    Plan:   Significant improvement to hypersomnia/fatigue with APAP tx.   Compliant with PAP and benefits from use. Follow up annually in the sleep clinic.  Weight loss and exercise to improve overall health.    Problem List Items Addressed This Visit          Other    Insomnia    RUFUS (obstructive sleep apnea) - Primary     Other Visit Diagnoses       Fatigue, unspecified type        Obesity, Class II, BMI 35-39.9              I spent a total of 23 minutes on the day of the visit.  This includes face to face time and non-face to face time preparing to see the patient (eg, review of tests), obtaining and/or reviewing separately obtained history, documenting clinical information in the electronic or other health record, independently interpreting results and communicating results to the patient/family/caregiver, or care coordinator.                 Elizabeth LeJeune, ACNP, ANP

## 2023-12-26 ENCOUNTER — PATIENT MESSAGE (OUTPATIENT)
Dept: FAMILY MEDICINE | Facility: CLINIC | Age: 63
End: 2023-12-26
Payer: COMMERCIAL

## 2024-01-19 ENCOUNTER — OFFICE VISIT (OUTPATIENT)
Dept: FAMILY MEDICINE | Facility: CLINIC | Age: 64
End: 2024-01-19
Payer: COMMERCIAL

## 2024-01-19 DIAGNOSIS — E66.01 SEVERE OBESITY (BMI 35.0-39.9) WITH COMORBIDITY: ICD-10-CM

## 2024-01-19 DIAGNOSIS — R13.11 ORAL PHASE DYSPHAGIA: Primary | ICD-10-CM

## 2024-01-19 DIAGNOSIS — F41.9 ANXIETY: ICD-10-CM

## 2024-01-19 DIAGNOSIS — M25.511 ACUTE PAIN OF RIGHT SHOULDER: ICD-10-CM

## 2024-01-19 PROCEDURE — 1159F MED LIST DOCD IN RCRD: CPT | Mod: CPTII,S$GLB,, | Performed by: FAMILY MEDICINE

## 2024-01-19 PROCEDURE — 99214 OFFICE O/P EST MOD 30 MIN: CPT | Mod: S$GLB,,, | Performed by: FAMILY MEDICINE

## 2024-01-19 PROCEDURE — 1160F RVW MEDS BY RX/DR IN RCRD: CPT | Mod: CPTII,S$GLB,, | Performed by: FAMILY MEDICINE

## 2024-01-19 RX ORDER — ESCITALOPRAM OXALATE 10 MG/1
10 TABLET ORAL DAILY
Qty: 30 TABLET | Refills: 11 | Status: SHIPPED | OUTPATIENT
Start: 2024-01-19 | End: 2024-04-29

## 2024-01-19 NOTE — PROGRESS NOTES
Subjective:      Patient ID: Kentrell Aguilera is a 63 y.o. female.    Chief Complaint: No chief complaint on file.      There were no vitals filed for this visit.     HPI   Fell one week ago at home; slipped in water and hit her head, no LOC, no ER or EMT  Now top of shoulder right and right neck hurt  Already going to PT after having knee surgery with Dr Daniels    Problem List  Patient Active Problem List   Diagnosis    Right foot pain    Ingrown toenail of right foot    Loss of balance    Ataxia, unspecified    Carpal tunnel syndrome    BMI 36.0-36.9,adult    Granuloma annulare    Vertigo    Tinea versicolor    Seborrheic keratoses    Renal cyst, acquired, right    Snores    Insomnia    RUFUS (obstructive sleep apnea)    Oral phase dysphagia    Acute pain of right shoulder    Severe obesity (BMI 35.0-39.9) with comorbidity    Anxiety        ALLERGIES:   Review of patient's allergies indicates:   Allergen Reactions    Erythromycin     Pcn [penicillins]        MEDS:   Current Outpatient Medications:     ALPRAZolam (XANAX) 0.25 MG tablet, TAKE 1 TABLET BY MOUTH NIGHTLY AS NEEDED FOR ANXIETY, Disp: 30 tablet, Rfl: 5    EScitalopram oxalate (LEXAPRO) 10 MG tablet, Take 1 tablet (10 mg total) by mouth once daily., Disp: 30 tablet, Rfl: 11      History:  Current Providers as of 1/19/2024  PCP: Rayray Mann MD  Care Team Provider: Donnell Mann MD  Care Team Provider: Felton Meraz LPN  Care Team Provider: Herman Santos LPN  Care Team Provider: Traci Kamara LPN  Encounter Provider: Rayray Mann MD, starting on Fri Jan 19, 2024 12:00 AM  Referring Provider: not found, starting on Fri Jan 19, 2024 12:00 AM  Consulting Physician: Rayray Mann MD, starting on Fri Jan 19, 2024  4:36 PM (Active)   History reviewed. No pertinent past medical history.  Past Surgical History:   Procedure Laterality Date    BREAST BIOPSY Left     negative 2015 negative    INJECTION OF ANESTHETIC AGENT AROUND MEDIAL BRANCH  NERVES INNERVATING LUMBAR FACET JOINT Bilateral 2022    Procedure: Bilateral L3-5 MBB;  Surgeon: Salinas Jennings MD;  Location: [facility];  Service: Pain Management;  Laterality: Bilateral;    INJECTION OF ANESTHETIC AGENT AROUND MEDIAL BRANCH NERVES INNERVATING LUMBAR FACET JOINT Bilateral 2023    Procedure: Bilateral L3-5 MBB;  Surgeon: Salinas Jennings MD;  Location: [facility];  Service: Pain Management;  Laterality: Bilateral;    INJECTION OF ANESTHETIC AGENT AROUND MEDIAL BRANCH NERVES INNERVATING LUMBAR FACET JOINT Bilateral 2023    Procedure: Bilateral L4/L5 and L5/S1 Facet Injection;  Surgeon: Salinas Jennings MD;  Location: [facility];  Service: Pain Management;  Laterality: Bilateral;    INJECTION OF ANESTHETIC AGENT AROUND MEDIAL BRANCH NERVES INNERVATING LUMBAR FACET JOINT Bilateral 2023    Procedure: Bilateral L4/L5 and L5/S1 Facet Injection;  Surgeon: Salinas Jennings MD;  Location: [facility];  Service: Pain Management;  Laterality: Bilateral;    KNEE SURGERY Right 10/05/2023    RADIOFREQUENCY THERMOCOAGULATION Bilateral 2023    Procedure: Bilateral L3-5 Lumbar RFA;  Surgeon: Salinas Jennings MD;  Location: [facility];  Service: Pain Management;  Laterality: Bilateral;    SELECTIVE INJECTION OF ANESTHETIC AGENT AROUND LUMBAR SPINAL NERVE ROOT BY TRANSFORAMINAL APPROACH Bilateral 2022    Procedure: Bilateral L5/S1 TF LAUREN;  Surgeon: Salinas Jennings MD;  Location: [facility];  Service: Pain Management;  Laterality: Bilateral;     Social History     Tobacco Use    Smoking status: Former     Current packs/day: 0.00     Types: Cigarettes     Quit date:      Years since quittin.0    Smokeless tobacco: Never   Substance Use Topics    Alcohol use: Yes         Review of Systems  Objective:     Physical Exam        Assessment:     1. Oral phase dysphagia    2. Acute pain of right shoulder    3. Severe obesity (BMI 35.0-39.9) with  comorbidity    4. Anxiety      Plan:        Medication List            Accurate as of January 19, 2024 11:59 PM. If you have any questions, ask your nurse or doctor.                START taking these medications      EScitalopram oxalate 10 MG tablet  Commonly known as: LEXAPRO  Take 1 tablet (10 mg total) by mouth once daily.  Started by: Rayray Mann MD            CONTINUE taking these medications      ALPRAZolam 0.25 MG tablet  Commonly known as: XANAX  TAKE 1 TABLET BY MOUTH NIGHTLY AS NEEDED FOR ANXIETY            STOP taking these medications      betamethasone dipropionate 0.05 % ointment  Commonly known as: DIPROLENE  Stopped by: Rayray Mann MD     diclofenac 75 MG EC tablet  Commonly known as: VOLTAREN  Stopped by: Rayray Mann MD     doxycycline 100 MG tablet  Commonly known as: VIBRA-TABS  Stopped by: Rayray Mann MD     hydrocortisone 2.5 % cream  Stopped by: Rayray Mann MD     meclizine 25 mg tablet  Commonly known as: ANTIVERT  Stopped by: Rayray Mann MD     ofloxacin 0.3 % ophthalmic solution  Commonly known as: OCUFLOX  Stopped by: Rayray Mann MD     PROLENSA 0.07 % Drop  Generic drug: bromfenac  Stopped by: Rayray Mann MD     RESTASIS 0.05 % ophthalmic emulsion  Generic drug: cycloSPORINE  Stopped by: Rayray Mann MD     WEGOVY 0.25 mg/0.5 mL Pnij  Generic drug: semaglutide (weight loss)  Stopped by: Rayray Mann MD               Where to Get Your Medications        These medications were sent to [facility] [address] [place], [place] [address]  [address] [place] [id number]  AIRLINE [facility] [id number]      Phone: [phone]   EScitalopram oxalate 10 MG tablet       Oral phase dysphagia  -     Ambulatory referral/consult to ENT; Future; Expected date: 01/26/2024    Acute pain of right shoulder  -     Ambulatory referral/consult to Physical/Occupational Therapy; Future; Expected date: 01/26/2024  -     X-Ray Cervical Spine Complete 5 view; Future; Expected date: 01/19/2024  -      X-ray Shoulder 2 or More Views Right; Future; Expected date: 01/19/2024    Severe obesity (BMI 35.0-39.9) with comorbidity    Anxiety    Other orders  -     EScitalopram oxalate (LEXAPRO) 10 MG tablet; Take 1 tablet (10 mg total) by mouth once daily.  Dispense: 30 tablet; Refill: 11

## 2024-01-22 ENCOUNTER — HOSPITAL ENCOUNTER (OUTPATIENT)
Dept: RADIOLOGY | Facility: HOSPITAL | Age: 64
Discharge: HOME | End: 2024-01-22
Attending: FAMILY MEDICINE
Payer: COMMERCIAL

## 2024-01-22 DIAGNOSIS — M25.511 ACUTE PAIN OF RIGHT SHOULDER: ICD-10-CM

## 2024-01-22 PROCEDURE — 72050 X-RAY EXAM NECK SPINE 4/5VWS: CPT | Mod: 26,,, | Performed by: RADIOLOGY

## 2024-01-22 PROCEDURE — 73030 X-RAY EXAM OF SHOULDER: CPT | Mod: 26,RT,, | Performed by: RADIOLOGY

## 2024-01-22 PROCEDURE — 72050 X-RAY EXAM NECK SPINE 4/5VWS: CPT | Mod: TC,FY,PN

## 2024-01-22 PROCEDURE — 73030 X-RAY EXAM OF SHOULDER: CPT | Mod: TC,FY,PN,RT

## 2024-01-23 ENCOUNTER — PATIENT MESSAGE (OUTPATIENT)
Dept: FAMILY MEDICINE | Facility: CLINIC | Age: 64
End: 2024-01-23
Payer: COMMERCIAL

## 2024-01-23 DIAGNOSIS — R13.11 ORAL PHASE DYSPHAGIA: Primary | ICD-10-CM

## 2024-01-24 ENCOUNTER — TELEPHONE (OUTPATIENT)
Dept: FAMILY MEDICINE | Facility: CLINIC | Age: 64
End: 2024-01-24
Payer: COMMERCIAL

## 2024-01-25 ENCOUNTER — PATIENT MESSAGE (OUTPATIENT)
Dept: FAMILY MEDICINE | Facility: CLINIC | Age: 64
End: 2024-01-25
Payer: COMMERCIAL

## 2024-01-26 ENCOUNTER — TELEPHONE (OUTPATIENT)
Dept: FAMILY MEDICINE | Facility: CLINIC | Age: 64
End: 2024-01-26
Payer: COMMERCIAL

## 2024-02-07 DIAGNOSIS — F41.9 ANXIETY: Primary | ICD-10-CM

## 2024-02-07 NOTE — TELEPHONE ENCOUNTER
No care due was identified.  Health [facility] Embedded Care Due Messages. Reference number: [id number].   2/07/2024 3:23:11 PM CST

## 2024-02-07 NOTE — TELEPHONE ENCOUNTER
No care due was identified.  Health [facility] Embedded Care Due Messages. Reference number: [id number].   2/07/2024 3:14:38 PM CST

## 2024-02-08 RX ORDER — ALPRAZOLAM 0.25 MG/1
TABLET ORAL
Qty: 30 TABLET | Refills: 5 | Status: SHIPPED | OUTPATIENT
Start: 2024-02-08

## 2024-02-08 RX ORDER — ALPRAZOLAM 0.25 MG/1
TABLET ORAL
Qty: 30 TABLET | Refills: 0 | OUTPATIENT
Start: 2024-02-08

## 2024-02-14 ENCOUNTER — PATIENT MESSAGE (OUTPATIENT)
Dept: FAMILY MEDICINE | Facility: CLINIC | Age: 64
End: 2024-02-14
Payer: COMMERCIAL

## 2024-02-14 ENCOUNTER — LAB VISIT (OUTPATIENT)
Dept: LAB | Facility: HOSPITAL | Age: 64
End: 2024-02-14
Attending: FAMILY MEDICINE
Payer: COMMERCIAL

## 2024-02-14 DIAGNOSIS — M25.561 CHRONIC PAIN OF BOTH KNEES: ICD-10-CM
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
## 2024-02-14 DIAGNOSIS — R53.83 FATIGUE, UNSPECIFIED TYPE: ICD-10-CM

## 2024-02-14 DIAGNOSIS — G89.29 CHRONIC PAIN OF BOTH KNEES: ICD-10-CM

## 2024-02-14 DIAGNOSIS — Z12.31 ENCOUNTER FOR SCREENING MAMMOGRAM FOR MALIGNANT NEOPLASM OF BREAST: Primary | ICD-10-CM

## 2024-02-14 DIAGNOSIS — Z00.00 ANNUAL PHYSICAL EXAM: ICD-10-CM

## 2024-02-14 DIAGNOSIS — M25.562 CHRONIC PAIN OF BOTH KNEES: ICD-10-CM

## 2024-02-14 LAB
BILIRUB UR QL STRIP: NEGATIVE
CLARITY UR REFRACT.AUTO: CLEAR
COLOR UR AUTO: YELLOW
GLUCOSE UR QL STRIP: NEGATIVE
HGB UR QL STRIP: NEGATIVE
KETONES UR QL STRIP: NEGATIVE
LEUKOCYTE ESTERASE UR QL STRIP: NEGATIVE
NITRITE UR QL STRIP: NEGATIVE
PH UR STRIP: 6 [PH] (ref 5–8)
PROT UR QL STRIP: NEGATIVE
SP GR UR STRIP: >=1.03 (ref 1–1.03)
URN SPEC COLLECT METH UR: ABNORMAL
UROBILINOGEN UR STRIP-ACNC: NEGATIVE EU/DL

## 2024-02-14 PROCEDURE — 81003 URINALYSIS AUTO W/O SCOPE: CPT | Mod: PN | Performed by: FAMILY MEDICINE

## 2024-02-19 ENCOUNTER — PATIENT MESSAGE (OUTPATIENT)
Dept: FAMILY MEDICINE | Facility: CLINIC | Age: 64
End: 2024-02-19
Payer: COMMERCIAL

## 2024-02-19 ENCOUNTER — TELEPHONE (OUTPATIENT)
Dept: FAMILY MEDICINE | Facility: CLINIC | Age: 64
End: 2024-02-19
Payer: COMMERCIAL

## 2024-02-19 DIAGNOSIS — M54.2 NECK PAIN: Primary | ICD-10-CM

## 2024-02-22 ENCOUNTER — PATIENT MESSAGE (OUTPATIENT)
Dept: FAMILY MEDICINE | Facility: CLINIC | Age: 64
End: 2024-02-22
Payer: COMMERCIAL

## 2024-02-22 ENCOUNTER — HOSPITAL ENCOUNTER (OUTPATIENT)
Dept: RADIOLOGY | Facility: HOSPITAL | Age: 64
Discharge: HOME | End: 2024-02-22
Attending: FAMILY MEDICINE
Payer: COMMERCIAL

## 2024-02-22 DIAGNOSIS — M54.2 NECK PAIN: ICD-10-CM

## 2024-02-22 PROCEDURE — 72125 CT NECK SPINE W/O DYE: CPT | Mod: TC,PN

## 2024-02-22 PROCEDURE — 72125 CT NECK SPINE W/O DYE: CPT | Mod: 26,,, | Performed by: RADIOLOGY

## 2024-02-23 ENCOUNTER — PATIENT MESSAGE (OUTPATIENT)
Dept: FAMILY MEDICINE | Facility: CLINIC | Age: 64
End: 2024-02-23
Payer: COMMERCIAL

## 2024-02-23 ENCOUNTER — PATIENT MESSAGE (OUTPATIENT)
Dept: NEUROSURGERY | Facility: CLINIC | Age: 64
End: 2024-02-23
Payer: COMMERCIAL

## 2024-02-23 ENCOUNTER — TELEPHONE (OUTPATIENT)
Dept: FAMILY MEDICINE | Facility: CLINIC | Age: 64
End: 2024-02-23
Payer: COMMERCIAL

## 2024-02-23 DIAGNOSIS — R20.2 PARESTHESIAS: ICD-10-CM

## 2024-02-23 DIAGNOSIS — M54.2 NECK PAIN: Primary | ICD-10-CM

## 2024-02-23 NOTE — TELEPHONE ENCOUNTER
I tried to copy and paste on the internet and nothing is coming up  Can you show me and I can send it to her

## 2024-03-14 ENCOUNTER — HOSPITAL ENCOUNTER (OUTPATIENT)
Dept: RADIOLOGY | Facility: HOSPITAL | Age: 64
Discharge: HOME | End: 2024-03-14
Attending: FAMILY MEDICINE
Payer: COMMERCIAL

## 2024-03-14 DIAGNOSIS — Z12.31 ENCOUNTER FOR SCREENING MAMMOGRAM FOR MALIGNANT NEOPLASM OF BREAST: ICD-10-CM

## 2024-03-14 PROCEDURE — 77067 SCR MAMMO BI INCL CAD: CPT | Mod: TC,PN

## 2024-03-14 PROCEDURE — 77067 SCR MAMMO BI INCL CAD: CPT | Mod: 26,,, | Performed by: RADIOLOGY

## 2024-03-14 PROCEDURE — 77063 BREAST TOMOSYNTHESIS BI: CPT | Mod: 26,,, | Performed by: RADIOLOGY

## 2024-03-25 ENCOUNTER — TELEPHONE (OUTPATIENT)
Dept: NEUROSURGERY | Facility: CLINIC | Age: 64
End: 2024-03-25
Payer: COMMERCIAL

## 2024-03-27 ENCOUNTER — OFFICE VISIT (OUTPATIENT)
Dept: NEUROSURGERY | Facility: CLINIC | Age: 64
End: 2024-03-27
Payer: COMMERCIAL

## 2024-03-27 VITALS
SYSTOLIC BLOOD PRESSURE: 140 MMHG | HEIGHT: 67 IN | BODY MASS INDEX: 37.72 KG/M2 | HEART RATE: 74 BPM | DIASTOLIC BLOOD PRESSURE: 77 MMHG | WEIGHT: 240.31 LBS

## 2024-03-27 DIAGNOSIS — M50.30 DDD (DEGENERATIVE DISC DISEASE), CERVICAL: ICD-10-CM

## 2024-03-27 DIAGNOSIS — M43.12 SPONDYLOLISTHESIS OF CERVICAL REGION: ICD-10-CM

## 2024-03-27 DIAGNOSIS — M47.812 CERVICAL SPONDYLOSIS WITHOUT MYELOPATHY: Primary | ICD-10-CM

## 2024-03-27 DIAGNOSIS — M54.2 NECK PAIN: ICD-10-CM

## 2024-03-27 DIAGNOSIS — R20.2 PARESTHESIAS: ICD-10-CM

## 2024-03-27 PROCEDURE — 1159F MED LIST DOCD IN RCRD: CPT | Mod: CPTII,S$GLB,, | Performed by: PHYSICIAN ASSISTANT

## 2024-03-27 PROCEDURE — 1160F RVW MEDS BY RX/DR IN RCRD: CPT | Mod: CPTII,S$GLB,, | Performed by: PHYSICIAN ASSISTANT

## 2024-03-27 PROCEDURE — 99999 PR PBB SHADOW E&M-EST. PATIENT-LVL IV: CPT | Mod: PBBFAC,,, | Performed by: PHYSICIAN ASSISTANT

## 2024-03-27 PROCEDURE — 3078F DIAST BP <80 MM HG: CPT | Mod: CPTII,S$GLB,, | Performed by: PHYSICIAN ASSISTANT

## 2024-03-27 PROCEDURE — 3077F SYST BP >= 140 MM HG: CPT | Mod: CPTII,S$GLB,, | Performed by: PHYSICIAN ASSISTANT

## 2024-03-27 PROCEDURE — 3044F HG A1C LEVEL LT 7.0%: CPT | Mod: CPTII,S$GLB,, | Performed by: PHYSICIAN ASSISTANT

## 2024-03-27 PROCEDURE — 99214 OFFICE O/P EST MOD 30 MIN: CPT | Mod: S$GLB,,, | Performed by: PHYSICIAN ASSISTANT

## 2024-03-27 PROCEDURE — 3008F BODY MASS INDEX DOCD: CPT | Mod: CPTII,S$GLB,, | Performed by: PHYSICIAN ASSISTANT

## 2024-03-27 NOTE — PROGRESS NOTES
Subjective:     Patient ID:  Kentrell Aguilera is a 63 y.o. female.    [place]    Chief Complaint:  Neck pain bilateral arm paresthesias left leg numbness    HPI   03/27/2024    Kentrell Aguilera is a 63 y.o. female who presents with the above CC.  Patient states that over the past year she has had 3 falls.  Her last fall 2 months ago she fell in a small space hitting her head and neck on a chair.  After that she started having pain in her neck with radiation to the right shoulder and clavicle region.  This has eased up over the last 2 months.  It comes and goes.  She has noticed about 1 month ago she started having numbness and tingling in the arms from the elbows to the hands as well as the left leg to the foot.  This is worse at night but is pretty much constant in the arms.  The right arm is worse than the left arm.  She denies any radiating arm pain.    She is going to physical therapy for her right knee.  He has started working with her neck also    She has not had cervical epidurals.  She has had lumbar epidurals in the past and is being followed by Neurosurgery out of [place].  She denies any previous spine surgery.  She is not taking any medication.    She has been having difficulty with dropping things for the past year.  She is difficulty with fine motor skills and balance.    Patient denies any recent accidents or trauma, no saddle anesthesias, and no bowel or bladder incontinence.      Review of Systems:    Review of Systems   Constitutional:  Negative for chills, diaphoresis, fever, malaise/fatigue and weight loss.   HENT:  Positive for nosebleeds. Negative for congestion, ear discharge, ear pain, hearing loss, sinus pain, sore throat and tinnitus.    Eyes:  Negative for blurred vision, double vision, photophobia, pain, discharge and redness.   Respiratory:  Negative for cough, hemoptysis, sputum production, shortness of breath, wheezing and stridor.    Cardiovascular:  Positive for PND. Negative for chest  pain, palpitations, orthopnea and leg swelling.   Gastrointestinal:  Negative for abdominal pain, blood in stool, constipation, diarrhea, heartburn, melena, nausea and vomiting.   Genitourinary:  Negative for dysuria, flank pain, frequency, hematuria and urgency.   Musculoskeletal:  Positive for falls and neck pain. Negative for back pain, joint pain and myalgias.   Skin:  Positive for itching. Negative for rash.   Neurological:  Positive for tingling, weakness and headaches. Negative for dizziness, tremors, sensory change, speech change, seizures and loss of consciousness.   Endo/Heme/Allergies:  Negative for environmental allergies and polydipsia. Does not bruise/bleed easily.   Psychiatric/Behavioral:  Negative for depression, hallucinations, memory loss and substance abuse. The patient is not nervous/anxious and does not have insomnia.          History reviewed. No pertinent past medical history.  Past Surgical History:   Procedure Laterality Date    BREAST BIOPSY Left     negative 2015 negative    INJECTION OF ANESTHETIC AGENT AROUND MEDIAL BRANCH NERVES INNERVATING LUMBAR FACET JOINT Bilateral 12/19/2022    Procedure: Bilateral L3-5 MBB;  Surgeon: Salinas Jennings MD;  Location: [facility];  Service: Pain Management;  Laterality: Bilateral;    INJECTION OF ANESTHETIC AGENT AROUND MEDIAL BRANCH NERVES INNERVATING LUMBAR FACET JOINT Bilateral 01/09/2023    Procedure: Bilateral L3-5 MBB;  Surgeon: Salinas Jennings MD;  Location: [facility];  Service: Pain Management;  Laterality: Bilateral;    INJECTION OF ANESTHETIC AGENT AROUND MEDIAL BRANCH NERVES INNERVATING LUMBAR FACET JOINT Bilateral 04/27/2023    Procedure: Bilateral L4/L5 and L5/S1 Facet Injection;  Surgeon: Salinas Jennings MD;  Location: [facility];  Service: Pain Management;  Laterality: Bilateral;    INJECTION OF ANESTHETIC AGENT AROUND MEDIAL BRANCH NERVES INNERVATING LUMBAR FACET JOINT Bilateral 11/13/2023    Procedure: Bilateral L4/L5  "and L5/S1 Facet Injection;  Surgeon: Salinas Jennings MD;  Location: [facility];  Service: Pain Management;  Laterality: Bilateral;    KNEE SURGERY Right 10/05/2023    RADIOFREQUENCY THERMOCOAGULATION Bilateral 2023    Procedure: Bilateral L3-5 Lumbar RFA;  Surgeon: Salinas Jennings MD;  Location: [facility];  Service: Pain Management;  Laterality: Bilateral;    SELECTIVE INJECTION OF ANESTHETIC AGENT AROUND LUMBAR SPINAL NERVE ROOT BY TRANSFORAMINAL APPROACH Bilateral 2022    Procedure: Bilateral L5/S1 TF LAUREN;  Surgeon: Salinas Jennings MD;  Location: [facility];  Service: Pain Management;  Laterality: Bilateral;     Current Outpatient Medications on File Prior to Visit   Medication Sig Dispense Refill    ALPRAZolam (XANAX) 0.25 MG tablet TAKE 1 TABLET BY MOUTH NIGHTLY AS NEEDED FOR ANXIETY 30 tablet 5    EScitalopram oxalate (LEXAPRO) 10 MG tablet Take 1 tablet (10 mg total) by mouth once daily. 30 tablet 11     No current facility-administered medications on file prior to visit.     Review of patient's allergies indicates:   Allergen Reactions    Erythromycin     Pcn [penicillins]      Social History     Socioeconomic History    Marital status: Single   Occupational History     Comment: not [employer]   Tobacco Use    Smoking status: Former     Current packs/day: 0.00     Types: Cigarettes     Quit date:      Years since quittin.    Smokeless tobacco: Never   Substance and Sexual Activity    Alcohol use: Yes     Family History   Problem Relation Age of Onset    COPD Mother     Cancer Mother         lung    Coronary artery disease Father     Diabetes Father     Hypertension Sister     No Known Problems Brother     COPD Sister        Objective:      Vitals:    24 1408   BP: (!) 140/77   Pulse: 74   Weight: 109 kg (240 lb 4.8 oz)   Height: 5' 7" (1.702 m)   PainSc:   5   PainLoc: Neck     Physical Exam:      General:  Kentrell Aguilera is well-developed, well-nourished, " appears stated age, in no acute distress, alert and oriented to person, place, and time.    Pulmonary/Chest:  Respiratory effort normal  Abdominal: Exhibits no distension  Psychiatric:  Normal mood and affect.  Behavior is normal.  Judgement and thought content normal      Musculoskeletal:    Patient is able to walk heel to toe without difficulty.    Cervical ROM:   Pain in cervical spine flexion, extension, left rotation, and right rotation, left lateral bending, and right lateral bending.    Cervical Spine Inspection:  Normal with no surgical scars with no visible rashes.    Cervical Spine Palpation:  No tenderness to cervical spine palpation.      Neurological:    Muscle strength against resistance:     Right Left   Deltoid  5 / 5 5 / 5   Biceps  5 / 5 5 / 5   Triceps 4 / 5 5 / 5   Wrist flexion  5 / 5 5 / 5   Wrist extension 5 / 5 5 / 5   Finger abduction 5 / 5 5 / 5   Thumb opposition 5 / 5 5 / 5   Handgrip 4 / 5 4 / 5   Hip flexion  5 / 5 5 / 5   Hip extension 5 / 5 5 / 5   Hip abduction 5 / 5 5 / 5   Hip adduction 5/ 5 5 / 5   Knee extension  5 / 5 5 / 5   Knee flexion  5 / 5 5 / 5   Dorsiflexion  5 / 5 5 / 5   EHL      Plantar flexion  5 / 5 5 / 5   Inversion of the feet 5 / 5 5 / 5   Eversion of the feet 5 / 5 5 / 5       Reflexes:     Right Left   Triceps 2+ 2+   Biceps 2+ 2+   Brachioradialis 2+ 2+   Patellar 2+ 2+   Achilles 2+ 2+       Clonus:  Negative bilaterally  Carpenter: Negative bilaterally    On gross examination of the bilateral lower extremities, patient has full painfree ROM with no signs of clubbing, cyanosis, edema, and weakness.      XRAY Results:     Cervical spine xrays    Impression:     1. There are moderate degenerative changes between C6 and C7.  2. There is grade 1 anterolisthesis of C4 on C5.  There is grade 1 anterolisthesis of C5 on C6.  3. There is mild narrowing of the left neural foramen between C3 and C7.  4. There is moderate narrowing of the right neural foramen between C3  and C4.  There is mild narrowing of the right neural foramen between C4 and C7.  .        Electronically signed by: Walter López MD  Date:                                            01/22/2024  Time:                                           16:46        CT cspine results    Impression:     Multilevel extensive degenerative spondylotic changes as well as bilateral facet arthropathy.  There is mild anterolisthesis present at C4-5, C5-6, and C7-T1.     Severe foraminal stenosis on the left at C4-5 and moderate foraminal stenosis on the right at C3-4.     All CT scans at this facility use dose modulation, iterative reconstructions, and/or weight base dosing when appropriate to reduce radiation dose to as low as reasonably achievable.        Electronically signed by: Marco Hodge MD  Date:                                            02/22/2024  Time:                                           10:07      Assessment:          1. Cervical spondylosis without myelopathy    2. Neck pain    3. Paresthesias    4. DDD (degenerative disc disease), cervical    5. Spondylolisthesis of cervical region            Plan:          Orders Placed This Encounter    MRI Cervical Spine Without Contrast       Patient with multilevel ddd and spondylosis with anterolisthesis at multiple levels.  Probable spinal stenosis at C7-T1.    -Will need MRI cspine done outside of [facility]  -FU after MRI is done  -She is not interested in trying gabapentin at this time    Follow-Up:  Follow up in about 2 weeks (around 4/10/2024). If there are any questions prior to this, the patient was instructed to contact the office.       Vandana Murray [facility], PA-C  Neurosurgery  [facility]  03/27/2024

## 2024-04-01 ENCOUNTER — PATIENT MESSAGE (OUTPATIENT)
Dept: FAMILY MEDICINE | Facility: CLINIC | Age: 64
End: 2024-04-01
Payer: COMMERCIAL

## 2024-04-03 DIAGNOSIS — Z78.0 MENOPAUSE: ICD-10-CM

## 2024-04-05 ENCOUNTER — TELEPHONE (OUTPATIENT)
Dept: FAMILY MEDICINE | Facility: CLINIC | Age: 64
End: 2024-04-05

## 2024-04-05 ENCOUNTER — HOSPITAL ENCOUNTER (OUTPATIENT)
Dept: RADIOLOGY | Facility: HOSPITAL | Age: 64
Discharge: HOME | End: 2024-04-05
Attending: FAMILY MEDICINE
Payer: COMMERCIAL

## 2024-04-05 ENCOUNTER — OFFICE VISIT (OUTPATIENT)
Dept: FAMILY MEDICINE | Facility: CLINIC | Age: 64
End: 2024-04-05
Payer: COMMERCIAL

## 2024-04-05 ENCOUNTER — PATIENT MESSAGE (OUTPATIENT)
Dept: FAMILY MEDICINE | Facility: CLINIC | Age: 64
End: 2024-04-05
Payer: COMMERCIAL

## 2024-04-05 DIAGNOSIS — J45.909 BRONCHITIS, ALLERGIC, UNSPECIFIED ASTHMA SEVERITY, UNCOMPLICATED: Primary | ICD-10-CM

## 2024-04-05 DIAGNOSIS — R05.1 ACUTE COUGH: ICD-10-CM

## 2024-04-05 DIAGNOSIS — Z78.0 MENOPAUSE: ICD-10-CM

## 2024-04-05 DIAGNOSIS — R05.1 ACUTE COUGH: Primary | ICD-10-CM

## 2024-04-05 PROCEDURE — 77080 DXA BONE DENSITY AXIAL: CPT | Mod: TC,PN

## 2024-04-05 PROCEDURE — 99214 OFFICE O/P EST MOD 30 MIN: CPT | Mod: 25,S$GLB,, | Performed by: FAMILY MEDICINE

## 2024-04-05 PROCEDURE — 96372 THER/PROPH/DIAG INJ SC/IM: CPT | Mod: S$GLB,,, | Performed by: FAMILY MEDICINE

## 2024-04-05 PROCEDURE — 71046 X-RAY EXAM CHEST 2 VIEWS: CPT | Mod: TC,FY,PN

## 2024-04-05 PROCEDURE — 77080 DXA BONE DENSITY AXIAL: CPT | Mod: 26,,, | Performed by: RADIOLOGY

## 2024-04-05 PROCEDURE — 3044F HG A1C LEVEL LT 7.0%: CPT | Mod: CPTII,S$GLB,, | Performed by: FAMILY MEDICINE

## 2024-04-05 PROCEDURE — 71046 X-RAY EXAM CHEST 2 VIEWS: CPT | Mod: 26,,, | Performed by: RADIOLOGY

## 2024-04-05 RX ORDER — TRIAMCINOLONE ACETONIDE 40 MG/ML
80 INJECTION, SUSPENSION INTRA-ARTICULAR; INTRAMUSCULAR ONCE
Status: COMPLETED | OUTPATIENT
Start: 2024-04-05 | End: 2024-04-05

## 2024-04-05 RX ORDER — DOXYCYCLINE 100 MG/1
100 CAPSULE ORAL EVERY 12 HOURS
Qty: 14 CAPSULE | Refills: 0 | Status: SHIPPED | OUTPATIENT
Start: 2024-04-05 | End: 2024-04-29

## 2024-04-05 RX ORDER — ALBUTEROL SULFATE 90 UG/1
2 AEROSOL, METERED RESPIRATORY (INHALATION) EVERY 4 HOURS PRN
Qty: 18 G | Refills: 2 | Status: SHIPPED | OUTPATIENT
Start: 2024-04-05 | End: 2024-04-06

## 2024-04-05 RX ADMIN — TRIAMCINOLONE ACETONIDE 80 MG: 40 INJECTION, SUSPENSION INTRA-ARTICULAR; INTRAMUSCULAR at 04:04

## 2024-04-05 NOTE — TELEPHONE ENCOUNTER
Sick  Coughing, spitting up phlegm, whitish  Wheezing  Couple days  May be fever  Not a freq issue  No cigarettes  No alcohol  CXR normal today  Had asthma related symptoms

## 2024-04-06 ENCOUNTER — TELEPHONE (OUTPATIENT)
Dept: FAMILY MEDICINE | Facility: CLINIC | Age: 64
End: 2024-04-06
Payer: COMMERCIAL

## 2024-04-06 RX ORDER — LEVALBUTEROL TARTRATE 45 UG/1
2 AEROSOL, METERED ORAL EVERY 4 HOURS PRN
Qty: 15 G | Refills: 1 | Status: SHIPPED | OUTPATIENT
Start: 2024-04-06 | End: 2024-04-29

## 2024-04-07 NOTE — PROGRESS NOTES
Subjective:      Patient ID: Kentrell Aguilera is a 64 y.o. female.    Chief Complaint: No chief complaint on file.      There were no vitals filed for this visit.     HPI   Coughing and wheezing for 2 dyas    Problem List  Patient Active Problem List   Diagnosis    Right foot pain    Ingrown toenail of right foot    Loss of balance    Ataxia, unspecified    Carpal tunnel syndrome    BMI 36.0-36.9,adult    Granuloma annulare    Vertigo    Tinea versicolor    Seborrheic keratoses    Renal cyst, acquired, right    Snores    Insomnia    RUFUS (obstructive sleep apnea)    Oral phase dysphagia    Acute pain of right shoulder    Severe obesity (BMI 35.0-39.9) with comorbidity    Anxiety        ALLERGIES:   Review of patient's allergies indicates:   Allergen Reactions    Erythromycin     Pcn [penicillins]        MEDS:   Current Outpatient Medications:     ALPRAZolam (XANAX) 0.25 MG tablet, TAKE 1 TABLET BY MOUTH NIGHTLY AS NEEDED FOR ANXIETY, Disp: 30 tablet, Rfl: 5    doxycycline (VIBRAMYCIN) 100 MG Cap, Take 1 capsule (100 mg total) by mouth every 12 (twelve) hours., Disp: 14 capsule, Rfl: 0    EScitalopram oxalate (LEXAPRO) 10 MG tablet, Take 1 tablet (10 mg total) by mouth once daily., Disp: 30 tablet, Rfl: 11    levalbuterol (XOPENEX HFA) 45 mcg/actuation inhaler, Inhale 2 puffs into the lungs every 4 (four) hours as needed for Wheezing. Rescue, Disp: 15 g, Rfl: 1      History:  Current Providers as of 4/5/2024  PCP: Rayray Mann MD  Care Team Provider: Donnell Mann MD  Care Team Provider: Felton Meraz LPN  Care Team Provider: Herman Santos LPN  Care Team Provider: Traci Kamara LPN  Encounter Provider: Rayray Mann MD, starting on Fri Apr 5, 2024 12:00 AM  Referring Provider: not found, starting on Fri Apr 5, 2024 12:00 AM  Consulting Physician: Rayray Mann MD, starting on Fri Apr 5, 2024  4:30 PM (Active)   No past medical history on file.  Past Surgical History:   Procedure Laterality Date     BREAST BIOPSY Left     negative  negative    INJECTION OF ANESTHETIC AGENT AROUND MEDIAL BRANCH NERVES INNERVATING LUMBAR FACET JOINT Bilateral 2022    Procedure: Bilateral L3-5 MBB;  Surgeon: Salinas Jennings MD;  Location: [facility];  Service: Pain Management;  Laterality: Bilateral;    INJECTION OF ANESTHETIC AGENT AROUND MEDIAL BRANCH NERVES INNERVATING LUMBAR FACET JOINT Bilateral 2023    Procedure: Bilateral L3-5 MBB;  Surgeon: Salinas Jennings MD;  Location: [facility];  Service: Pain Management;  Laterality: Bilateral;    INJECTION OF ANESTHETIC AGENT AROUND MEDIAL BRANCH NERVES INNERVATING LUMBAR FACET JOINT Bilateral 2023    Procedure: Bilateral L4/L5 and L5/S1 Facet Injection;  Surgeon: Salinas Jennings MD;  Location: [facility];  Service: Pain Management;  Laterality: Bilateral;    INJECTION OF ANESTHETIC AGENT AROUND MEDIAL BRANCH NERVES INNERVATING LUMBAR FACET JOINT Bilateral 2023    Procedure: Bilateral L4/L5 and L5/S1 Facet Injection;  Surgeon: Salinas Jennings MD;  Location: [facility];  Service: Pain Management;  Laterality: Bilateral;    KNEE SURGERY Right 10/05/2023    RADIOFREQUENCY THERMOCOAGULATION Bilateral 2023    Procedure: Bilateral L3-5 Lumbar RFA;  Surgeon: Salinas Jennings MD;  Location: [facility];  Service: Pain Management;  Laterality: Bilateral;    SELECTIVE INJECTION OF ANESTHETIC AGENT AROUND LUMBAR SPINAL NERVE ROOT BY TRANSFORAMINAL APPROACH Bilateral 2022    Procedure: Bilateral L5/S1 TF LAUREN;  Surgeon: Salinas Jennings MD;  Location: [facility];  Service: Pain Management;  Laterality: Bilateral;     Social History     Tobacco Use    Smoking status: Former     Current packs/day: 0.00     Types: Cigarettes     Quit date:      Years since quittin.2    Smokeless tobacco: Never   Substance Use Topics    Alcohol use: Yes         Review of Systems   Constitutional: Negative.    HENT: Negative.     Respiratory:   Positive for cough and wheezing.    Cardiovascular: Negative.    Gastrointestinal: Negative.    Endocrine: Negative.    Genitourinary: Negative.    Musculoskeletal: Negative.    Psychiatric/Behavioral: Negative.     All other systems reviewed and are negative.    Objective:     Physical Exam  Vitals and nursing note reviewed.   Constitutional:       Appearance: She is well-developed.   HENT:      Head: Normocephalic.   Eyes:      Conjunctiva/sclera: Conjunctivae normal.      Pupils: Pupils are equal, round, and reactive to light.   Cardiovascular:      Rate and Rhythm: Normal rate and regular rhythm.      Heart sounds: Normal heart sounds.   Pulmonary:      Effort: Pulmonary effort is normal. No respiratory distress.      Breath sounds: No stridor. Wheezing and rales present. No rhonchi.   Chest:      Chest wall: No tenderness.   Musculoskeletal:         General: Normal range of motion.      Cervical back: Normal range of motion and neck supple.   Skin:     General: Skin is warm and dry.   Neurological:      Mental Status: She is alert and oriented to person, place, and time.      Deep Tendon Reflexes: Reflexes are normal and symmetric.   Psychiatric:         Behavior: Behavior normal.         Thought Content: Thought content normal.         Judgment: Judgment normal.             Assessment:     1. Bronchitis, allergic, unspecified asthma severity, uncomplicated      Plan:        Medication List            Accurate as of April 5, 2024 11:59 PM. If you have any questions, ask your nurse or doctor.                START taking these medications      albuterol 90 mcg/actuation inhaler  Commonly known as: VENTOLIN HFA  Inhale 2 puffs into the lungs every 4 (four) hours as needed for Wheezing.  Started by: Rayray Mann MD     doxycycline 100 MG Cap  Commonly known as: VIBRAMYCIN  Take 1 capsule (100 mg total) by mouth every 12 (twelve) hours.  Started by: Rayray Mann MD            CONTINUE taking these medications       ALPRAZolam 0.25 MG tablet  Commonly known as: XANAX  TAKE 1 TABLET BY MOUTH NIGHTLY AS NEEDED FOR ANXIETY     EScitalopram oxalate 10 MG tablet  Commonly known as: LEXAPRO  Take 1 tablet (10 mg total) by mouth once daily.               Where to Get Your Medications        These medications were sent to [id number] - [place], [place] - [address] [facility]  [address] [facility] [address]      Phone: [phone]   albuterol 90 mcg/actuation inhaler  doxycycline 100 MG Cap       Bronchitis, allergic, unspecified asthma severity, uncomplicated

## 2024-04-17 ENCOUNTER — PATIENT MESSAGE (OUTPATIENT)
Dept: NEUROSURGERY | Facility: CLINIC | Age: 64
End: 2024-04-17
Payer: COMMERCIAL

## 2024-04-29 ENCOUNTER — OFFICE VISIT (OUTPATIENT)
Dept: FAMILY MEDICINE | Facility: CLINIC | Age: 64
End: 2024-04-29
Payer: COMMERCIAL

## 2024-04-29 VITALS
HEART RATE: 72 BPM | HEIGHT: 67 IN | DIASTOLIC BLOOD PRESSURE: 66 MMHG | TEMPERATURE: 98 F | WEIGHT: 238.31 LBS | SYSTOLIC BLOOD PRESSURE: 120 MMHG | OXYGEN SATURATION: 97 % | BODY MASS INDEX: 37.4 KG/M2

## 2024-04-29 DIAGNOSIS — F41.9 ANXIETY: ICD-10-CM

## 2024-04-29 DIAGNOSIS — Z00.00 ANNUAL PHYSICAL EXAM: Primary | ICD-10-CM

## 2024-04-29 DIAGNOSIS — E66.01 SEVERE OBESITY (BMI 35.0-39.9) WITH COMORBIDITY: ICD-10-CM

## 2024-04-29 PROCEDURE — 3044F HG A1C LEVEL LT 7.0%: CPT | Mod: CPTII,S$GLB,, | Performed by: FAMILY MEDICINE

## 2024-04-29 PROCEDURE — 3008F BODY MASS INDEX DOCD: CPT | Mod: CPTII,S$GLB,, | Performed by: FAMILY MEDICINE

## 2024-04-29 PROCEDURE — 1100F PTFALLS ASSESS-DOCD GE2>/YR: CPT | Mod: CPTII,S$GLB,, | Performed by: FAMILY MEDICINE

## 2024-04-29 PROCEDURE — 99397 PER PM REEVAL EST PAT 65+ YR: CPT | Mod: S$GLB,,, | Performed by: FAMILY MEDICINE

## 2024-04-29 PROCEDURE — 3074F SYST BP LT 130 MM HG: CPT | Mod: CPTII,S$GLB,, | Performed by: FAMILY MEDICINE

## 2024-04-29 PROCEDURE — 1160F RVW MEDS BY RX/DR IN RCRD: CPT | Mod: CPTII,S$GLB,, | Performed by: FAMILY MEDICINE

## 2024-04-29 PROCEDURE — 3288F FALL RISK ASSESSMENT DOCD: CPT | Mod: CPTII,S$GLB,, | Performed by: FAMILY MEDICINE

## 2024-04-29 PROCEDURE — 3078F DIAST BP <80 MM HG: CPT | Mod: CPTII,S$GLB,, | Performed by: FAMILY MEDICINE

## 2024-04-29 PROCEDURE — 1159F MED LIST DOCD IN RCRD: CPT | Mod: CPTII,S$GLB,, | Performed by: FAMILY MEDICINE

## 2024-04-29 RX ORDER — SEMAGLUTIDE 0.68 MG/ML
0.25 INJECTION, SOLUTION SUBCUTANEOUS
Start: 2024-04-29

## 2024-04-29 RX ORDER — TERBINAFINE HYDROCHLORIDE 250 MG/1
250 TABLET ORAL DAILY
Qty: 90 TABLET | Refills: 0 | Status: SHIPPED | OUTPATIENT
Start: 2024-04-29

## 2024-04-29 NOTE — PROGRESS NOTES
"Subjective:      Patient ID: Kentrell Aguilera is a 64 y.o. female.    Chief Complaint: Follow-up (3 mon )      Vitals:    04/29/24 1557   BP: 120/66   Pulse: 72   Temp: 98 °F (36.7 °C)   TempSrc: Oral   SpO2: 97%   Weight: 108.1 kg (238 lb 5.1 oz)   Height: 5' 7" (1.702 m)        HPI   Follow up of multiple med problems; finally got ozempic, took second shot yesterday, from [facility]  Had EGD and colonoscopy in [facility] in [place]  Had EGD and colon April 16  Saw neurosurgeon, no MRI due to hassle, needs open MRI  Right knee is better, surgery in right iknee last year    Numbness is better in both arms    Wearing CPAP now always and sleeps 8 - 9 hours and feels rested in AM  Wants right big toe fungus nail deformity treated with lamisil      Problem List  Patient Active Problem List   Diagnosis    Right foot pain    Ingrown toenail of right foot    Loss of balance    Ataxia, unspecified    Carpal tunnel syndrome    BMI 36.0-36.9,adult    Granuloma annulare    Vertigo    Tinea versicolor    Seborrheic keratoses    Renal cyst, acquired, right    Snores    Insomnia    RUFUS (obstructive sleep apnea)    Oral phase dysphagia    Acute pain of right shoulder    Severe obesity (BMI 35.0-39.9) with comorbidity    Anxiety        ALLERGIES:   Review of patient's allergies indicates:   Allergen Reactions    Erythromycin     Pcn [penicillins]        MEDS:   Current Outpatient Medications:     ALPRAZolam (XANAX) 0.25 MG tablet, TAKE 1 TABLET BY MOUTH NIGHTLY AS NEEDED FOR ANXIETY, Disp: 30 tablet, Rfl: 5    semaglutide (OZEMPIC) 0.25 mg or 0.5 mg (2 mg/3 mL) pen injector, Inject 0.25 mg into the skin every 7 days., Disp: , Rfl:     terbinafine HCL (LAMISIL) 250 mg tablet, Take 1 tablet (250 mg total) by mouth once daily. For nail fungus, Disp: 90 tablet, Rfl: 0      History:  Current Providers as of 4/29/2024  PCP: Rayray Mann MD  Care Team Provider: Felton Meraz LPN  Care Team Provider: Herman Santos LPN  Care Team Provider: " Traci Kamara LPN  Care Team Provider: Orly Matamoros MD  Encounter Provider: Rayray Mann MD, starting on Mon Apr 29, 2024 12:00 AM  Referring Provider: not found, starting on Mon Apr 29, 2024 12:00 AM  Consulting Physician: Rayray Mann MD, starting on Mon Apr 29, 2024  3:55 PM (Active)   No past medical history on file.  Past Surgical History:   Procedure Laterality Date    BREAST BIOPSY Left     negative 2015 negative    INJECTION OF ANESTHETIC AGENT AROUND MEDIAL BRANCH NERVES INNERVATING LUMBAR FACET JOINT Bilateral 12/19/2022    Procedure: Bilateral L3-5 MBB;  Surgeon: Salinas Jennings MD;  Location: [facility];  Service: Pain Management;  Laterality: Bilateral;    INJECTION OF ANESTHETIC AGENT AROUND MEDIAL BRANCH NERVES INNERVATING LUMBAR FACET JOINT Bilateral 01/09/2023    Procedure: Bilateral L3-5 MBB;  Surgeon: Salinas Jennings MD;  Location: [facility];  Service: Pain Management;  Laterality: Bilateral;    INJECTION OF ANESTHETIC AGENT AROUND MEDIAL BRANCH NERVES INNERVATING LUMBAR FACET JOINT Bilateral 04/27/2023    Procedure: Bilateral L4/L5 and L5/S1 Facet Injection;  Surgeon: Salinas Jennings MD;  Location: [facility];  Service: Pain Management;  Laterality: Bilateral;    INJECTION OF ANESTHETIC AGENT AROUND MEDIAL BRANCH NERVES INNERVATING LUMBAR FACET JOINT Bilateral 11/13/2023    Procedure: Bilateral L4/L5 and L5/S1 Facet Injection;  Surgeon: Salinas Jennings MD;  Location: [facility];  Service: Pain Management;  Laterality: Bilateral;    KNEE SURGERY Right 10/05/2023    RADIOFREQUENCY THERMOCOAGULATION Bilateral 01/30/2023    Procedure: Bilateral L3-5 Lumbar RFA;  Surgeon: Salinas Jennings MD;  Location: [facility];  Service: Pain Management;  Laterality: Bilateral;    SELECTIVE INJECTION OF ANESTHETIC AGENT AROUND LUMBAR SPINAL NERVE ROOT BY TRANSFORAMINAL APPROACH Bilateral 11/09/2022    Procedure: Bilateral L5/S1 TF LAUREN;  Surgeon: Salinas Jennings MD;   [facility];  Service: Pain Management;  Laterality: Bilateral;     Social History     Tobacco Use    Smoking status: Former     Current packs/day: 0.00     Types: Cigarettes     Quit date:      Years since quittin.3     Passive exposure: Never    Smokeless tobacco: Never   Substance Use Topics    Alcohol use: Yes         Review of Systems   Constitutional: Negative.    HENT: Negative.     Respiratory: Negative.     Cardiovascular: Negative.    Gastrointestinal: Negative.    Endocrine: Negative.    Genitourinary: Negative.    Musculoskeletal: Negative.    Skin:         Nail fungus   Psychiatric/Behavioral: Negative.     All other systems reviewed and are negative.    Objective:     Physical Exam  Vitals and nursing note reviewed.   Constitutional:       Appearance: She is well-developed.   HENT:      Head: Normocephalic.   Eyes:      Conjunctiva/sclera: Conjunctivae normal.      Pupils: Pupils are equal, round, and reactive to light.   Cardiovascular:      Rate and Rhythm: Normal rate and regular rhythm.      Heart sounds: Normal heart sounds.   Pulmonary:      Effort: Pulmonary effort is normal.      Breath sounds: Normal breath sounds.   Musculoskeletal:         General: Normal range of motion.      Cervical back: Normal range of motion and neck supple.   Skin:     General: Skin is warm and dry.   Neurological:      General: No focal deficit present.      Mental Status: She is alert and oriented to person, place, and time. Mental status is at baseline.      Deep Tendon Reflexes: Reflexes are normal and symmetric.   Psychiatric:         Mood and Affect: Mood normal.         Behavior: Behavior normal.         Thought Content: Thought content normal.         Judgment: Judgment normal.             Assessment:     1. Annual physical exam    2. Anxiety    3. Severe obesity (BMI 35.0-39.9) with comorbidity      Plan:        Medication List            Accurate as of 2024 11:59 PM. If you  have any questions, ask your nurse or doctor.                START taking these medications      OZEMPIC 0.25 mg or 0.5 mg (2 mg/3 mL) pen injector  Generic drug: semaglutide  Inject 0.25 mg into the skin every 7 days.  Started by: Rayray Mann MD     terbinafine  mg tablet  Commonly known as: LAMISIL  Take 1 tablet (250 mg total) by mouth once daily. For nail fungus  Started by: Rayray Mann MD            CONTINUE taking these medications      ALPRAZolam 0.25 MG tablet  Commonly known as: XANAX  TAKE 1 TABLET BY MOUTH NIGHTLY AS NEEDED FOR ANXIETY            STOP taking these medications      EScitalopram oxalate 10 MG tablet  Commonly known as: LEXAPRO  Stopped by: Rayray Mann MD               Where to Get Your Medications        These medications were sent to [facility] [address] [place] [address] [address] [facility]  [address] [facility] [address]      Phone: [phone]   terbinafine  mg tablet       Information about where to get these medications is not yet available    Ask your nurse or doctor about these medications  OZEMPIC 0.25 mg or 0.5 mg (2 mg/3 mL) pen injector       Annual physical exam  -     Type & Screen; Future; Expected date: 02/14/2025  -     CBC Auto Differential; Future; Expected date: 02/14/2025  -     Comprehensive Metabolic Panel; Future; Expected date: 02/14/2025  -     Fecal Immunochemical Test (iFOBT); Future; Expected date: 02/14/2025  -     Lipid Panel; Future; Expected date: 02/14/2025  -     TSH; Future; Expected date: 02/14/2025  -     Urinalysis; Future; Expected date: 02/14/2025  -     Vitamin D; Future; Expected date: 02/14/2025    Anxiety  -     Type & Screen; Future; Expected date: 02/14/2025  -     CBC Auto Differential; Future; Expected date: 02/14/2025  -     Comprehensive Metabolic Panel; Future; Expected date: 02/14/2025  -     Fecal Immunochemical Test (iFOBT); Future; Expected date: 02/14/2025  -     Lipid Panel; Future; Expected date:  02/14/2025  -     TSH; Future; Expected date: 02/14/2025  -     Urinalysis; Future; Expected date: 02/14/2025  -     Vitamin D; Future; Expected date: 02/14/2025    Severe obesity (BMI 35.0-39.9) with comorbidity  -     Type & Screen; Future; Expected date: 02/14/2025  -     CBC Auto Differential; Future; Expected date: 02/14/2025  -     Comprehensive Metabolic Panel; Future; Expected date: 02/14/2025  -     Fecal Immunochemical Test (iFOBT); Future; Expected date: 02/14/2025  -     Lipid Panel; Future; Expected date: 02/14/2025  -     TSH; Future; Expected date: 02/14/2025  -     Urinalysis; Future; Expected date: 02/14/2025  -     Vitamin D; Future; Expected date: 02/14/2025    Other orders  -     terbinafine HCL (LAMISIL) 250 mg tablet; Take 1 tablet (250 mg total) by mouth once daily. For nail fungus  Dispense: 90 tablet; Refill: 0  -     semaglutide (OZEMPIC) 0.25 mg or 0.5 mg (2 mg/3 mL) pen injector; Inject 0.25 mg into the skin every 7 days.

## 2024-05-06 ENCOUNTER — CLINICAL SUPPORT (OUTPATIENT)
Dept: FAMILY MEDICINE | Facility: CLINIC | Age: 64
End: 2024-05-06
Payer: COMMERCIAL

## 2024-05-06 ENCOUNTER — TELEPHONE (OUTPATIENT)
Dept: FAMILY MEDICINE | Facility: CLINIC | Age: 64
End: 2024-05-06
Payer: COMMERCIAL

## 2024-05-06 DIAGNOSIS — L29.9 ITCHING: Primary | ICD-10-CM

## 2024-05-06 PROCEDURE — 96372 THER/PROPH/DIAG INJ SC/IM: CPT | Mod: S$GLB,,, | Performed by: FAMILY MEDICINE

## 2024-05-06 RX ORDER — TRIAMCINOLONE ACETONIDE 40 MG/ML
40 INJECTION, SUSPENSION INTRA-ARTICULAR; INTRAMUSCULAR ONCE
Status: COMPLETED | OUTPATIENT
Start: 2024-05-06 | End: 2024-05-06

## 2024-05-06 RX ADMIN — TRIAMCINOLONE ACETONIDE 40 MG: 40 INJECTION, SUSPENSION INTRA-ARTICULAR; INTRAMUSCULAR at 02:05

## 2024-05-07 ENCOUNTER — PATIENT MESSAGE (OUTPATIENT)
Dept: FAMILY MEDICINE | Facility: CLINIC | Age: 64
End: 2024-05-07
Payer: COMMERCIAL

## 2024-05-30 ENCOUNTER — PATIENT MESSAGE (OUTPATIENT)
Dept: FAMILY MEDICINE | Facility: CLINIC | Age: 64
End: 2024-05-30
Payer: COMMERCIAL

## 2024-05-30 RX ORDER — HYDROCORTISONE 25 MG/G
CREAM TOPICAL
Qty: 29 G | Refills: 0 | OUTPATIENT
Start: 2024-05-30

## 2024-05-30 RX ORDER — HYDROCORTISONE 25 MG/G
CREAM TOPICAL
Qty: 28 G | Refills: 5 | Status: SHIPPED | OUTPATIENT
Start: 2024-05-30

## 2024-06-07 ENCOUNTER — TELEPHONE (OUTPATIENT)
Dept: FAMILY MEDICINE | Facility: CLINIC | Age: 64
End: 2024-06-07
Payer: COMMERCIAL

## 2024-09-19 ENCOUNTER — OFFICE VISIT (OUTPATIENT)
Dept: FAMILY MEDICINE | Facility: CLINIC | Age: 64
End: 2024-09-19
Payer: COMMERCIAL

## 2024-09-19 VITALS
SYSTOLIC BLOOD PRESSURE: 120 MMHG | OXYGEN SATURATION: 95 % | HEIGHT: 67 IN | TEMPERATURE: 99 F | HEART RATE: 73 BPM | BODY MASS INDEX: 37.33 KG/M2 | DIASTOLIC BLOOD PRESSURE: 60 MMHG

## 2024-09-19 DIAGNOSIS — M79.671 RIGHT FOOT PAIN: Primary | ICD-10-CM

## 2024-09-19 DIAGNOSIS — M67.40 GANGLION CYST: ICD-10-CM

## 2024-09-19 PROCEDURE — 1160F RVW MEDS BY RX/DR IN RCRD: CPT | Mod: CPTII,S$GLB,, | Performed by: FAMILY MEDICINE

## 2024-09-19 PROCEDURE — 3074F SYST BP LT 130 MM HG: CPT | Mod: CPTII,S$GLB,, | Performed by: FAMILY MEDICINE

## 2024-09-19 PROCEDURE — 99214 OFFICE O/P EST MOD 30 MIN: CPT | Mod: S$GLB,,, | Performed by: FAMILY MEDICINE

## 2024-09-19 PROCEDURE — 1159F MED LIST DOCD IN RCRD: CPT | Mod: CPTII,S$GLB,, | Performed by: FAMILY MEDICINE

## 2024-09-19 PROCEDURE — 1101F PT FALLS ASSESS-DOCD LE1/YR: CPT | Mod: CPTII,S$GLB,, | Performed by: FAMILY MEDICINE

## 2024-09-19 PROCEDURE — 3008F BODY MASS INDEX DOCD: CPT | Mod: CPTII,S$GLB,, | Performed by: FAMILY MEDICINE

## 2024-09-19 PROCEDURE — 3288F FALL RISK ASSESSMENT DOCD: CPT | Mod: CPTII,S$GLB,, | Performed by: FAMILY MEDICINE

## 2024-09-19 PROCEDURE — 3078F DIAST BP <80 MM HG: CPT | Mod: CPTII,S$GLB,, | Performed by: FAMILY MEDICINE

## 2024-09-19 PROCEDURE — 3044F HG A1C LEVEL LT 7.0%: CPT | Mod: CPTII,S$GLB,, | Performed by: FAMILY MEDICINE

## 2024-09-20 ENCOUNTER — HOSPITAL ENCOUNTER (OUTPATIENT)
Dept: RADIOLOGY | Facility: HOSPITAL | Age: 64
Discharge: HOME | End: 2024-09-20
Attending: FAMILY MEDICINE
Payer: COMMERCIAL

## 2024-09-20 DIAGNOSIS — M79.671 RIGHT FOOT PAIN: ICD-10-CM

## 2024-09-20 PROCEDURE — 73630 X-RAY EXAM OF FOOT: CPT | Mod: TC,FY,PN,RT

## 2024-09-20 PROCEDURE — 73630 X-RAY EXAM OF FOOT: CPT | Mod: 26,RT,, | Performed by: RADIOLOGY

## 2024-09-23 ENCOUNTER — PATIENT MESSAGE (OUTPATIENT)
Dept: FAMILY MEDICINE | Facility: CLINIC | Age: 64
End: 2024-09-23
Payer: COMMERCIAL

## 2024-09-23 NOTE — PROGRESS NOTES
"Subjective:      Patient ID: Kentrell Aguilera is a 64 y.o. female.    Chief Complaint: Foot Pain (Right foot)      Vitals:    09/19/24 1702   BP: 120/60   Pulse: 73   Temp: 98.9 °F (37.2 °C)   TempSrc: Oral   SpO2: 95%   Height: 5' 7" (1.702 m)        HPI   Foot pain dorsum, no trauma    Problem List  Patient Active Problem List   Diagnosis    Right foot pain    Ingrown toenail of right foot    Loss of balance    Ataxia, unspecified    Carpal tunnel syndrome    BMI 36.0-36.9,adult    Granuloma annulare    Vertigo    Tinea versicolor    Seborrheic keratoses    Renal cyst, acquired, right    Snores    Insomnia    RUFUS (obstructive sleep apnea)    Oral phase dysphagia    Acute pain of right shoulder    Severe obesity (BMI 35.0-39.9) with comorbidity    Anxiety        ALLERGIES:   Review of patient's allergies indicates:   Allergen Reactions    Erythromycin     Pcn [penicillins]        MEDS:   Current Outpatient Medications:     ALPRAZolam (XANAX) 0.25 MG tablet, TAKE 1 TABLET BY MOUTH NIGHTLY AS NEEDED FOR ANXIETY, Disp: 30 tablet, Rfl: 5    hydrocortisone 2.5 % cream, APPLY  CREAM TO RASH TWICE DAILY, Disp: 28 g, Rfl: 5    semaglutide (OZEMPIC) 0.25 mg or 0.5 mg (2 mg/3 mL) pen injector, Inject 0.25 mg into the skin every 7 days., Disp: , Rfl:     terbinafine HCL (LAMISIL) 250 mg tablet, Take 1 tablet (250 mg total) by mouth once daily. For nail fungus, Disp: 90 tablet, Rfl: 0      History:  Current Providers as of 9/19/2024  PCP: Rayray Mann MD  Care Team Provider: Felton Meraz LPN  Care Team Provider: Herman Santos LPN  Care Team Provider: Traci Kamara LPN  Care Team Provider: Orly Matamoros MD  Encounter Provider: Rayray Mann MD, starting on Thu Sep 19, 2024 12:00 AM  Referring Provider: not found, starting on Thu Sep 19, 2024 12:00 AM  Consulting Physician: Rayray Mann MD, starting on Thu Sep 19, 2024  4:59 PM (Active)   History reviewed. No pertinent past medical history.  Past " Surgical History:   Procedure Laterality Date    BREAST BIOPSY Left     negative  negative    INJECTION OF ANESTHETIC AGENT AROUND MEDIAL BRANCH NERVES INNERVATING LUMBAR FACET JOINT Bilateral 2022    Procedure: Bilateral L3-5 MBB;  Surgeon: Salinas Jennings MD;  Location: [facility];  Service: Pain Management;  Laterality: Bilateral;    INJECTION OF ANESTHETIC AGENT AROUND MEDIAL BRANCH NERVES INNERVATING LUMBAR FACET JOINT Bilateral 2023    Procedure: Bilateral L3-5 MBB;  Surgeon: Salinas Jennings MD;  Location: [facility];  Service: Pain Management;  Laterality: Bilateral;    INJECTION OF ANESTHETIC AGENT AROUND MEDIAL BRANCH NERVES INNERVATING LUMBAR FACET JOINT Bilateral 2023    Procedure: Bilateral L4/L5 and L5/S1 Facet Injection;  Surgeon: Salinas Jennings MD;  Location: [facility];  Service: Pain Management;  Laterality: Bilateral;    INJECTION OF ANESTHETIC AGENT AROUND MEDIAL BRANCH NERVES INNERVATING LUMBAR FACET JOINT Bilateral 2023    Procedure: Bilateral L4/L5 and L5/S1 Facet Injection;  Surgeon: Salinas Jennings MD;  Location: [facility];  Service: Pain Management;  Laterality: Bilateral;    KNEE SURGERY Right 10/05/2023    RADIOFREQUENCY THERMOCOAGULATION Bilateral 2023    Procedure: Bilateral L3-5 Lumbar RFA;  Surgeon: Salinas Jennings MD;  Location: [facility];  Service: Pain Management;  Laterality: Bilateral;    SELECTIVE INJECTION OF ANESTHETIC AGENT AROUND LUMBAR SPINAL NERVE ROOT BY TRANSFORAMINAL APPROACH Bilateral 2022    Procedure: Bilateral L5/S1 TF LAUREN;  Surgeon: Salinas Jennings MD;  Location: [facility];  Service: Pain Management;  Laterality: Bilateral;     Social History     Tobacco Use    Smoking status: Former     Current packs/day: 0.00     Types: Cigarettes     Quit date:      Years since quittin.7     Passive exposure: Never    Smokeless tobacco: Never   Substance Use Topics    Alcohol use: Yes          Review of Systems   Constitutional: Negative.    HENT: Negative.     Respiratory: Negative.     Cardiovascular: Negative.    Gastrointestinal: Negative.    Endocrine: Negative.    Genitourinary: Negative.    Musculoskeletal:  Positive for arthralgias and gait problem.   Psychiatric/Behavioral: Negative.     All other systems reviewed and are negative.    Objective:     Physical Exam  Vitals and nursing note reviewed.   Constitutional:       Appearance: She is well-developed.   HENT:      Head: Normocephalic.   Eyes:      Conjunctiva/sclera: Conjunctivae normal.      Pupils: Pupils are equal, round, and reactive to light.   Cardiovascular:      Rate and Rhythm: Normal rate and regular rhythm.      Heart sounds: Normal heart sounds.   Pulmonary:      Effort: Pulmonary effort is normal.      Breath sounds: Normal breath sounds.   Musculoskeletal:         General: Swelling and tenderness present. Normal range of motion.      Cervical back: Normal range of motion and neck supple.   Skin:     General: Skin is warm and dry.   Neurological:      Mental Status: She is alert and oriented to person, place, and time.      Deep Tendon Reflexes: Reflexes are normal and symmetric.   Psychiatric:         Behavior: Behavior normal.         Thought Content: Thought content normal.         Judgment: Judgment normal.             Assessment:     1. Right foot pain    2. Ganglion cyst      Plan:        Medication List            Accurate as of September 19, 2024 11:59 PM. If you have any questions, ask your nurse or doctor.                CONTINUE taking these medications      ALPRAZolam 0.25 MG tablet  Commonly known as: XANAX  TAKE 1 TABLET BY MOUTH NIGHTLY AS NEEDED FOR ANXIETY     hydrocortisone 2.5 % cream  APPLY  CREAM TO RASH TWICE DAILY     OZEMPIC 0.25 mg or 0.5 mg (2 mg/3 mL) pen injector  Generic drug: semaglutide  Inject 0.25 mg into the skin every 7 days.     terbinafine  mg tablet  Commonly known as:  LAMISIL  Take 1 tablet (250 mg total) by mouth once daily. For nail fungus            Right foot pain  -     X-Ray Foot Complete Right; Future; Expected date: 09/19/2024    Ganglion cyst

## 2024-09-24 ENCOUNTER — IMMUNIZATION (OUTPATIENT)
Dept: FAMILY MEDICINE | Facility: CLINIC | Age: 64
End: 2024-09-24
Payer: COMMERCIAL

## 2024-09-24 DIAGNOSIS — Z23 NEED FOR VACCINATION: Primary | ICD-10-CM

## 2024-09-24 PROCEDURE — 90653 IIV ADJUVANT VACCINE IM: CPT | Mod: S$GLB,,, | Performed by: FAMILY MEDICINE

## 2024-09-24 PROCEDURE — G0008 ADMIN INFLUENZA VIRUS VAC: HCPCS | Mod: S$GLB,,, | Performed by: FAMILY MEDICINE

## 2024-09-24 NOTE — TELEPHONE ENCOUNTER
I spoke with the pt - semaglutide at [facility]  C/o rash and itching  Even saw derm

## 2024-09-25 ENCOUNTER — PATIENT MESSAGE (OUTPATIENT)
Dept: FAMILY MEDICINE | Facility: CLINIC | Age: 64
End: 2024-09-25
Payer: COMMERCIAL

## 2024-09-26 RX ORDER — PHENTERMINE HYDROCHLORIDE 37.5 MG/1
37.5 TABLET ORAL
Qty: 30 TABLET | Refills: 0 | Status: SHIPPED | OUTPATIENT
Start: 2024-09-26 | End: 2024-10-26

## 2024-09-26 NOTE — TELEPHONE ENCOUNTER
Aipex for wt loss, buy bp cuff and monitor b; one months worth only  Having foot surgery Oct 31 with Renu

## 2024-10-31 ENCOUNTER — HOSPITAL ENCOUNTER (EMERGENCY)
Facility: HOSPITAL | Age: 64
Discharge: HOME | End: 2024-11-01
Attending: EMERGENCY MEDICINE
Payer: COMMERCIAL

## 2024-10-31 DIAGNOSIS — R11.10 POSTOPERATIVE VOMITING: Primary | ICD-10-CM

## 2024-10-31 DIAGNOSIS — R51.9 ACUTE NONINTRACTABLE HEADACHE, UNSPECIFIED HEADACHE TYPE: ICD-10-CM

## 2024-10-31 DIAGNOSIS — Z98.890 POSTOPERATIVE VOMITING: Primary | ICD-10-CM

## 2024-10-31 PROCEDURE — 25000003 PHARM REV CODE 250: Mod: ER | Performed by: EMERGENCY MEDICINE

## 2024-10-31 PROCEDURE — 96372 THER/PROPH/DIAG INJ SC/IM: CPT | Performed by: EMERGENCY MEDICINE

## 2024-10-31 PROCEDURE — 99284 EMERGENCY DEPT VISIT MOD MDM: CPT | Mod: 25,ER

## 2024-10-31 PROCEDURE — 63600175 PHARM REV CODE 636 W HCPCS: Mod: ER | Performed by: EMERGENCY MEDICINE

## 2024-10-31 RX ORDER — METOCLOPRAMIDE HYDROCHLORIDE 5 MG/ML
10 INJECTION INTRAMUSCULAR; INTRAVENOUS
Status: COMPLETED | OUTPATIENT
Start: 2024-10-31 | End: 2024-10-31

## 2024-10-31 RX ORDER — ONDANSETRON 4 MG/1
4 TABLET, ORALLY DISINTEGRATING ORAL
Status: COMPLETED | OUTPATIENT
Start: 2024-10-31 | End: 2024-10-31

## 2024-10-31 RX ADMIN — ONDANSETRON 4 MG: 4 TABLET, ORALLY DISINTEGRATING ORAL at 11:10

## 2024-10-31 RX ADMIN — METOCLOPRAMIDE 10 MG: 5 INJECTION, SOLUTION INTRAMUSCULAR; INTRAVENOUS at 11:10

## 2024-11-01 VITALS
HEART RATE: 88 BPM | WEIGHT: 150 LBS | TEMPERATURE: 98 F | HEIGHT: 67 IN | BODY MASS INDEX: 23.54 KG/M2 | SYSTOLIC BLOOD PRESSURE: 128 MMHG | RESPIRATION RATE: 18 BRPM | OXYGEN SATURATION: 99 % | DIASTOLIC BLOOD PRESSURE: 73 MMHG

## 2024-11-01 RX ORDER — ONDANSETRON 4 MG/1
4 TABLET, ORALLY DISINTEGRATING ORAL EVERY 6 HOURS PRN
Qty: 12 TABLET | Refills: 0 | Status: SHIPPED | OUTPATIENT
Start: 2024-11-01

## 2024-11-01 NOTE — ED NOTES
Pt arrives to Ed with complaints of nausea and headache. Pt reports multiple episodes of emesis today, photophobia. Pt lying in bed, respirations even, unlabored, NAD noted, answering questions appropriately.

## 2024-11-01 NOTE — ED PROVIDER NOTES
ED Provider Note - 10/31/2024    History     Chief Complaint   Patient presents with    Emesis     Pt had right foot surgery and not able to hold anything stated she vomited 4x today.     Patient presents with concern regarding PONV.  Patient underwent a procedure earlier today with Podiatry and describes nausea since along with 4-5 episodes of emesis.  No reported abdominal pain or diarrhea.  She does endorse gradual development of a frontal HA but denies visual changes or other neuro symptoms.      Review of patient's allergies indicates:   Allergen Reactions    Erythromycin     Pcn [penicillins]     Ozempic [semaglutide] Nausea And Vomiting     History reviewed. No pertinent past medical history.  Past Surgical History:   Procedure Laterality Date    BREAST BIOPSY Left     negative 2015 negative    INJECTION OF ANESTHETIC AGENT AROUND MEDIAL BRANCH NERVES INNERVATING LUMBAR FACET JOINT Bilateral 12/19/2022    Procedure: Bilateral L3-5 MBB;  Surgeon: Salinas Jennings MD;  Location: [facility] PAIN MGT;  Service: Pain Management;  Laterality: Bilateral;    INJECTION OF ANESTHETIC AGENT AROUND MEDIAL BRANCH NERVES INNERVATING LUMBAR FACET JOINT Bilateral 01/09/2023    Procedure: Bilateral L3-5 MBB;  Surgeon: Salinas Jennings MD;  Location: [facility] PAIN [facility]GT;  Service: Pain Management;  Laterality: Bilateral;    INJECTION OF ANESTHETIC AGENT AROUND MEDIAL BRANCH NERVES INNERVATING LUMBAR FACET JOINT Bilateral 04/27/2023    Procedure: Bilateral L4/L5 and L5/S1 Facet Injection;  Surgeon: Salinas Jennings MD;  Location: [facility] PAIN [facility]GT;  Service: Pain Management;  Laterality: Bilateral;    INJECTION OF ANESTHETIC AGENT AROUND MEDIAL BRANCH NERVES INNERVATING LUMBAR FACET JOINT Bilateral 11/13/2023    Procedure: Bilateral L4/L5 and L5/S1 Facet Injection;  Surgeon: Salinas Jennings MD;  Location: [facility] PAIN [facility]GT;  Service: Pain Management;  Laterality: Bilateral;    KNEE SURGERY Right 10/05/2023    RADIOFREQUENCY THERMOCOAGULATION  "Bilateral 2023    Procedure: Bilateral L3-5 Lumbar RFA;  Surgeon: Salinas Jennings MD;  [facility];  Service: Pain Management;  Laterality: Bilateral;    SELECTIVE INJECTION OF ANESTHETIC AGENT AROUND LUMBAR SPINAL NERVE ROOT BY TRANSFORAMINAL APPROACH Bilateral 2022    Procedure: Bilateral L5/S1 TF LAUREN;  Surgeon: Salinas Jennings MD;  Location: [facility] PAIN [facility]GT;  Service: Pain Management;  Laterality: Bilateral;     Family History   Problem Relation Name Age of Onset    COPD Mother      Cancer Mother          lung    Coronary artery disease Father      Diabetes Father      Hypertension Sister      No Known Problems Brother      COPD Sister       Social History     Tobacco Use    Smoking status: Former     Current packs/day: 0.00     Types: Cigarettes     Quit date:      Years since quittin.8     Passive exposure: Never    Smokeless tobacco: Never   Substance Use Topics    Alcohol use: Yes     Review of Systems   Constitutional:  Negative for chills and fever.   HENT:  Negative for congestion and rhinorrhea.    Respiratory:  Negative for cough and shortness of breath.    Cardiovascular:  Negative for chest pain and palpitations.   Gastrointestinal:  Positive for nausea and vomiting. Negative for abdominal pain and diarrhea.   Genitourinary:  Negative for difficulty urinating and dysuria.   Skin:  Negative for color change and rash.   Neurological:  Positive for headaches. Negative for dizziness and light-headedness.   Hematological:  Negative for adenopathy. Does not bruise/bleed easily.       Physical Exam     Initial Vitals [10/31/24 2203]   BP Pulse Resp Temp SpO2   (!) 151/67 92 18 97.7 °F (36.5 °C) 98 %      MAP       --         Vitals:    10/31/24 2203 10/31/24 2346 24 0025   BP: (!) 151/67 (!) 141/75 128/73   Pulse: 92  88   Resp: 18     Temp: 97.7 °F (36.5 °C)     SpO2: 98%  99%   Weight: 68 kg (150 lb)     Height: 5' 7" (1.702 m)       Physical Exam    Nursing note and " vitals reviewed.  Constitutional: She appears well-developed and well-nourished. She is not diaphoretic. No distress.   HENT:   Head: Normocephalic and atraumatic.   Nose: Nose normal. Mouth/Throat: Oropharynx is clear and moist.   Eyes: Conjunctivae are normal. No scleral icterus.   Cardiovascular:  Normal rate, regular rhythm and intact distal pulses.           Pulmonary/Chest: No respiratory distress.   Abdominal: Abdomen is soft. She exhibits no distension. There is no abdominal tenderness.   Musculoskeletal:         General: No edema. Normal range of motion.     Neurological: She is alert and oriented to person, place, and time.   Skin: Skin is warm and dry.         ED Course   Procedures                   MDM  Differential Diagnoses   Based on available history, the working differential diagnoses considered during this evaluation include but are not limited to PONV, viral gastroenteritis, food poisoning, intra-abdominal infection, enteritis/colitis.      LABS     Labs Reviewed - No data to display             Imaging     Imaging Results    None                EKG        ED Management/Discussion     Medications   ondansetron disintegrating tablet 4 mg (4 mg Oral Given 10/31/24 2317)   metoclopramide injection 10 mg (10 mg Intramuscular Given 10/31/24 2317)                 The patient's list of active medical problems, social history, medications, and allergies as documented per RN staff has been reviewed.           Patient feeling much better on FU assessment.  Nausea resolved and she has tolerated PO challenge.  The patient's current headache seems to fit the intensity and pattern of prior headaches.  Patient has no evidence of infection nor neurological findings to suggest a more malignant cause for the headache.      On final assessment, the patient appears suitable for discharge.  I see no indication of an emergent process beyond that addressed during our encounter but have duly counseled the patient/family  regarding the need for prompt follow-up as well as the indications that should prompt immediate return to the emergency room.  The patient/family has been provided with language -specific verbal and printed direction regarding our final diagnosis(es) as well as instructions regarding use of OTC and/or Rx medications intended to manage the patient's aforementioned conditions including:  ED Prescriptions       Medication Sig Dispense Start Date End Date Auth. Provider    ondansetron (ZOFRAN-ODT) 4 MG TbDL Take 1 tablet (4 mg total) by mouth every 6 (six) hours as needed (nausea). 12 tablet 11/1/2024 -- Keshawn Gallardo MD              Patient has been advised of the following recommended follow-up instructions:  Follow-up Information       Follow up With Specialties Details Why Contact Info    Rayray Mann MD Family Medicine Schedule an appointment as soon as possible for a visit  for reassessment [address] [place] [id number] [phone]      [facility] - Emergency Dept Emergency Medicine Go to  As needed, If symptoms worsen [address] [facility]  Emergency Department  [facility] [id number] [phone]          The patient/family communicates understanding of all this information and all remaining questions and concerns were addressed at this time.      Referrals:  No orders of the defined types were placed in this encounter.      CLINICAL IMPRESSION    ICD-10-CM ICD-9-CM   1. Postoperative vomiting  R11.10 787.03    Z98.890    2. Acute nonintractable headache, unspecified headache type  R51.9 784.0          ED Disposition Condition    Discharge Stable                 Keshwan Gallardo MD  11/01/24 1533

## 2024-12-04 ENCOUNTER — PATIENT MESSAGE (OUTPATIENT)
Dept: SLEEP MEDICINE | Facility: CLINIC | Age: 64
End: 2024-12-04
Payer: COMMERCIAL

## 2024-12-11 ENCOUNTER — PATIENT MESSAGE (OUTPATIENT)
Dept: SLEEP MEDICINE | Facility: CLINIC | Age: 64
End: 2024-12-11
Payer: COMMERCIAL

## 2024-12-12 ENCOUNTER — OFFICE VISIT (OUTPATIENT)
Dept: PULMONOLOGY | Facility: CLINIC | Age: 64
End: 2024-12-12
Payer: COMMERCIAL

## 2024-12-12 ENCOUNTER — PATIENT MESSAGE (OUTPATIENT)
Dept: SLEEP MEDICINE | Facility: CLINIC | Age: 64
End: 2024-12-12
Payer: COMMERCIAL

## 2024-12-12 VITALS — BODY MASS INDEX: 23.49 KG/M2 | HEIGHT: 67 IN

## 2024-12-12 DIAGNOSIS — E66.01 SEVERE OBESITY (BMI 35.0-39.9) WITH COMORBIDITY: ICD-10-CM

## 2024-12-12 DIAGNOSIS — G47.00 INSOMNIA, UNSPECIFIED TYPE: ICD-10-CM

## 2024-12-12 DIAGNOSIS — G47.33 OSA (OBSTRUCTIVE SLEEP APNEA): Primary | ICD-10-CM

## 2024-12-12 PROCEDURE — 1160F RVW MEDS BY RX/DR IN RCRD: CPT | Mod: CPTII,95,, | Performed by: NURSE PRACTITIONER

## 2024-12-12 PROCEDURE — 1159F MED LIST DOCD IN RCRD: CPT | Mod: CPTII,95,, | Performed by: NURSE PRACTITIONER

## 2024-12-12 PROCEDURE — 99213 OFFICE O/P EST LOW 20 MIN: CPT | Mod: 95,,, | Performed by: NURSE PRACTITIONER

## 2024-12-12 PROCEDURE — 3044F HG A1C LEVEL LT 7.0%: CPT | Mod: CPTII,95,, | Performed by: NURSE PRACTITIONER

## 2024-12-12 PROCEDURE — 3008F BODY MASS INDEX DOCD: CPT | Mod: CPTII,95,, | Performed by: NURSE PRACTITIONER

## 2024-12-12 NOTE — PROGRESS NOTES
"Subjective:      Patient ID: Kentrell Aguilera is a 64 y.o. female.    Chief Complaint: Sleep Apnea  The patient location is: [place]  The chief complaint leading to consultation is: sleep apnea   Visit type: Virtual visit with synchronous audio and video  Total time spent with patient: 12 min   Each patient to whom he or she provides medical services by telemedicine is:  (1) informed of the relationship between the physician and patient and the respective role of any other health care provider with respect to management of the patient; and (2) notified that he or she may decline to receive medical services by telemedicine and may withdraw from such care at any time.    HPI    Presents to office for review of AutoPAP therapy. Patient states improved symptoms with use of AutoPAP. Sleeping more soundly. Waking up feeling more refreshed. Improved daytime sleepiness. Patient states she is benefiting from use of the AutoPAP.      Patient Active Problem List   Diagnosis    Right foot pain    Ingrown toenail of right foot    Loss of balance    Ataxia, unspecified    Carpal tunnel syndrome    BMI 36.0-36.9,adult    Granuloma annulare    Vertigo    Tinea versicolor    Seborrheic keratoses    Renal cyst, acquired, right    Snores    Insomnia    RUFUS (obstructive sleep apnea)    Oral phase dysphagia    Acute pain of right shoulder    Severe obesity (BMI 35.0-39.9) with comorbidity    Anxiety     Ht 5' 7" (1.702 m)   BMI 23.49 kg/m²   Body mass index is 23.49 kg/m².    Review of Systems   Constitutional: Negative.    HENT: Negative.     Respiratory: Negative.     Cardiovascular: Negative.    Musculoskeletal: Negative.    Gastrointestinal: Negative.    Neurological: Negative.    Psychiatric/Behavioral: Negative.       Objective:      Physical Exam  Constitutional:       Appearance: She is well-developed.   HENT:      Head: Normocephalic and atraumatic.   Pulmonary:      Effort: Pulmonary effort is normal. No tachypnea, bradypnea, " accessory muscle usage or respiratory distress.   Musculoskeletal:      Cervical back: Normal range of motion.   Skin:     Findings: No rash.   Neurological:      Mental Status: She is alert and oriented to person, place, and time.   Psychiatric:         Behavior: Behavior normal.         Thought Content: Thought content normal.         Judgment: Judgment normal.       Personal Diagnostic Review      12/13/2023   EPWORTH SLEEPINESS SCALE   Sitting and reading 0   Watching TV 0   Sitting, inactive in a public place (e.g. a theatre or a meeting) 0   As a passenger in a car for an hour without a break 0   Lying down to rest in the afternoon when circumstances permit 0   Sitting and talking to someone 0   Sitting quietly after a lunch without alcohol 0   In a car, while stopped for a few minutes in traffic 0   Total score 0     Compliance Report  Compliance  Payor Standard  Usage 11/11/2024 - 12/10/2024  Usage days 30/30 days (100%)  >= 4 hours 30 days (100%)  < 4 hours 0 days (0%)  Usage hours 232 hours 55 minutes  Average usage (total days) 7 hours 46 minutes  Average usage (days used) 7 hours 46 minutes  Median usage (days used) 7 hours 47 minutes  Total used hours (value since last reset - 12/10/2024) 3,228 hours  AirSense 10 AutoSet  Serial number [id number]  Mode AutoSet  Min Pressure 5 cmH2O  Max Pressure 16 cmH2O  EPR Fulltime  EPR level 3  Response Standard  Therapy  Pressure - cmH2O Median: 8.9 95th percentile: 11.4 Maximum: 12.6  Leaks - L/min Median: 8.9 95th percentile: 27.1 Maximum: 33.6  Events per hour AI: 1.8 HI: 0.3 AHI: 2.1  Apnea Index Central: 0.1 Obstructive: 1.5 Unknown: 0.2  RERA Index 0.1    Assessment:       1. RUFUS (obstructive sleep apnea)    2. Insomnia, unspecified type    3. Severe obesity (BMI 35.0-39.9) with comorbidity        Outpatient Encounter Medications as of 12/12/2024   Medication Sig Dispense Refill    ALPRAZolam (XANAX) 0.25 MG tablet TAKE 1 TABLET BY MOUTH NIGHTLY AS NEEDED  FOR ANXIETY 30 tablet 5    hydrocortisone 2.5 % cream APPLY  CREAM TO RASH TWICE DAILY 28 g 5    ondansetron (ZOFRAN-ODT) 4 MG TbDL Take 1 tablet (4 mg total) by mouth every 6 (six) hours as needed (nausea). 12 tablet 0    [DISCONTINUED] semaglutide (OZEMPIC) 0.25 mg or 0.5 mg (2 mg/3 mL) pen injector Inject 0.25 mg into the skin every 7 days.      [DISCONTINUED] terbinafine HCL (LAMISIL) 250 mg tablet Take 1 tablet (250 mg total) by mouth once daily. For nail fungus 90 tablet 0     No facility-administered encounter medications on file as of 12/12/2024.     Orders Placed This Encounter   Procedures    CPAP/BIPAP SUPPLIES     Order Specific Question:   Length of need (1-99 months):     Answer:   99     Order Specific Question:   Choose ONE mask type and its corresponding cushions and/or pillows:     Answer:    Nasal Mask, 1 per 90 days:  Nasal Cushions, (6 per 90 days):  Nasal Pillows, (6 per 90 days)     Order Specific Question:   Choose EITHER Heated or Non-Heated Tubjing     Answer:    Heated Tubing, 1 per 6 months     Order Specific Question:   All other supplies as needed as listed below:     Answer:    Headgear, 1 per 180 days     Order Specific Question:   All other supplies as needed as listed below:     Answer:    Disposable Filter, 6 per 90 days     Order Specific Question:   All other supplies as needed as listed below:     Answer:    Non-Disposable Filter, 1 per 180 days     Order Specific Question:   All other supplies as needed as listed below:     Answer:    Humidifier Chamber, 1 per 180 days     Order Specific Question:   All other supplies as needed as listed below:     Answer:    Chin Strap, 1 per 180 days     Plan:   Significant improvement to hypersomnia/fatigue with APAP tx.   Compliant with PAP and benefits from use. Follow up annually in the sleep clinic.    Problem List Items Addressed This Visit          Endocrine    Severe obesity (BMI 35.0-39.9)  with comorbidity       Other    Insomnia    RUFUS (obstructive sleep apnea) - Primary    Relevant Orders    CPAP/BIPAP SUPPLIES                Elizabeth LeJeune, ACNP, ANP

## 2024-12-20 ENCOUNTER — HOSPITAL ENCOUNTER (OUTPATIENT)
Dept: RADIOLOGY | Facility: HOSPITAL | Age: 64
Discharge: HOME | End: 2024-12-20
Attending: FAMILY MEDICINE
Payer: COMMERCIAL

## 2024-12-20 ENCOUNTER — TELEPHONE (OUTPATIENT)
Dept: FAMILY MEDICINE | Facility: CLINIC | Age: 64
End: 2024-12-20
Payer: COMMERCIAL

## 2024-12-20 DIAGNOSIS — M79.89 LEFT LEG SWELLING: ICD-10-CM

## 2024-12-20 DIAGNOSIS — M79.89 LEFT LEG SWELLING: Primary | ICD-10-CM

## 2024-12-20 PROCEDURE — 93971 EXTREMITY STUDY: CPT | Mod: 26,RT,, | Performed by: STUDENT IN AN ORGANIZED HEALTH CARE EDUCATION/TRAINING PROGRAM

## 2024-12-20 PROCEDURE — 93971 EXTREMITY STUDY: CPT | Mod: TC,PN,RT

## 2025-01-31 ENCOUNTER — TELEPHONE (OUTPATIENT)
Dept: FAMILY MEDICINE | Facility: CLINIC | Age: 65
End: 2025-01-31
Payer: COMMERCIAL

## 2025-01-31 RX ORDER — VALACYCLOVIR HYDROCHLORIDE 1 G/1
2000 TABLET, FILM COATED ORAL EVERY 12 HOURS
Qty: 24 TABLET | Refills: 1 | Status: SHIPPED | OUTPATIENT
Start: 2025-01-31 | End: 2026-01-31

## 2025-02-08 ENCOUNTER — TELEPHONE (OUTPATIENT)
Dept: FAMILY MEDICINE | Facility: CLINIC | Age: 65
End: 2025-02-08
Payer: COMMERCIAL

## 2025-02-08 DIAGNOSIS — R53.83 FATIGUE, UNSPECIFIED TYPE: ICD-10-CM

## 2025-02-08 DIAGNOSIS — Z13.6 ENCOUNTER FOR SCREENING FOR CARDIOVASCULAR DISORDERS: Primary | ICD-10-CM

## 2025-02-08 DIAGNOSIS — R27.0 ATAXIA, UNSPECIFIED: ICD-10-CM

## 2025-02-08 DIAGNOSIS — E66.01 SEVERE OBESITY (BMI 35.0-39.9) WITH COMORBIDITY: ICD-10-CM

## 2025-02-11 ENCOUNTER — HOSPITAL ENCOUNTER (OUTPATIENT)
Dept: RADIOLOGY | Facility: HOSPITAL | Age: 65
Discharge: HOME | End: 2025-02-11
Attending: FAMILY MEDICINE
Payer: COMMERCIAL

## 2025-02-11 ENCOUNTER — TELEPHONE (OUTPATIENT)
Dept: FAMILY MEDICINE | Facility: CLINIC | Age: 65
End: 2025-02-11
Payer: COMMERCIAL

## 2025-02-11 DIAGNOSIS — R53.83 FATIGUE, UNSPECIFIED TYPE: ICD-10-CM

## 2025-02-11 DIAGNOSIS — E66.01 SEVERE OBESITY (BMI 35.0-39.9) WITH COMORBIDITY: ICD-10-CM

## 2025-02-11 DIAGNOSIS — Z13.6 ENCOUNTER FOR SCREENING FOR CARDIOVASCULAR DISORDERS: ICD-10-CM

## 2025-02-11 DIAGNOSIS — R27.0 ATAXIA, UNSPECIFIED: ICD-10-CM

## 2025-02-11 PROCEDURE — 93880 EXTRACRANIAL BILAT STUDY: CPT | Mod: TC,PN

## 2025-02-11 PROCEDURE — 93880 EXTRACRANIAL BILAT STUDY: CPT | Mod: 26,,, | Performed by: STUDENT IN AN ORGANIZED HEALTH CARE EDUCATION/TRAINING PROGRAM

## 2025-02-11 NOTE — TELEPHONE ENCOUNTER
----- Message from Trendyta sent at 2/11/2025 11:16 AM CST -----  Patient have NM MPI test but the Nuclear stress test is missing. Can you put the Nuclear stress test order in then I can schedule patient.

## 2025-02-12 ENCOUNTER — TELEPHONE (OUTPATIENT)
Dept: FAMILY MEDICINE | Facility: CLINIC | Age: 65
End: 2025-02-12
Payer: COMMERCIAL

## 2025-02-12 DIAGNOSIS — R07.9 CHEST PAIN, UNSPECIFIED TYPE: Primary | ICD-10-CM

## 2025-02-13 NOTE — TELEPHONE ENCOUNTER
----- Message from Dania Rey sent at 11/14/2022  1:58 PM CST -----  Regarding: PT'S RETURNING A CALL FROM STAFF  Contact: PT  Confirmed contact info below:  Contact Name: Kentrell Aguilera  Phone Number: [phone]      
LVM for pt in regards to her message she left. Trying to schedule her VSI injection  
Heterosexual

## 2025-02-26 ENCOUNTER — LAB VISIT (OUTPATIENT)
Dept: LAB | Facility: HOSPITAL | Age: 65
End: 2025-02-26
Attending: FAMILY MEDICINE
Payer: COMMERCIAL

## 2025-02-26 ENCOUNTER — PATIENT MESSAGE (OUTPATIENT)
Dept: FAMILY MEDICINE | Facility: CLINIC | Age: 65
End: 2025-02-26
Payer: COMMERCIAL

## 2025-02-26 ENCOUNTER — TELEPHONE (OUTPATIENT)
Dept: FAMILY MEDICINE | Facility: CLINIC | Age: 65
End: 2025-02-26
Payer: COMMERCIAL

## 2025-02-26 DIAGNOSIS — R53.83 FATIGUE, UNSPECIFIED TYPE: ICD-10-CM

## 2025-02-26 DIAGNOSIS — Z13.6 ENCOUNTER FOR SCREENING FOR CARDIOVASCULAR DISORDERS: ICD-10-CM

## 2025-02-26 DIAGNOSIS — F41.9 ANXIETY: ICD-10-CM

## 2025-02-26 DIAGNOSIS — Z00.00 ANNUAL PHYSICAL EXAM: ICD-10-CM

## 2025-02-26 DIAGNOSIS — E66.01 SEVERE OBESITY (BMI 35.0-39.9) WITH COMORBIDITY: ICD-10-CM

## 2025-02-26 DIAGNOSIS — Z00.00 ANNUAL PHYSICAL EXAM: Primary | ICD-10-CM

## 2025-02-26 LAB
25(OH)D3+25(OH)D2 SERPL-MCNC: 30 NG/ML (ref 30–96)
ALBUMIN SERPL BCP-MCNC: 3.9 G/DL (ref 3.5–5.2)
ALP SERPL-CCNC: 105 U/L (ref 38–126)
ALT SERPL W/O P-5'-P-CCNC: 32 U/L (ref 10–44)
ANION GAP SERPL CALC-SCNC: 6 MMOL/L (ref 8–16)
AST SERPL-CCNC: 28 U/L (ref 15–46)
BASOPHILS # BLD AUTO: 0.06 K/UL (ref 0–0.2)
BASOPHILS NFR BLD: 1.1 % (ref 0–1.9)
BILIRUB SERPL-MCNC: 0.5 MG/DL (ref 0.1–1)
CALCIUM SERPL-MCNC: 9.1 MG/DL (ref 8.7–10.5)
CHLORIDE SERPL-SCNC: 104 MMOL/L (ref 95–110)
CHOLEST SERPL-MCNC: 197 MG/DL (ref 120–199)
CHOLEST/HDLC SERPL: 3 {RATIO} (ref 2–5)
CO2 SERPL-SCNC: 27 MMOL/L (ref 23–29)
CREAT SERPL-MCNC: 0.66 MG/DL (ref 0.5–1.4)
DIFFERENTIAL METHOD BLD: NORMAL
EOSINOPHIL # BLD AUTO: 0.1 K/UL (ref 0–0.5)
EOSINOPHIL NFR BLD: 2 % (ref 0–8)
ERYTHROCYTE [DISTWIDTH] IN BLOOD BY AUTOMATED COUNT: 13 % (ref 11.5–14.5)
EST. GFR  (NO RACE VARIABLE): >60 ML/MIN/1.73 M^2
GLUCOSE SERPL-MCNC: 95 MG/DL (ref 70–110)
HCT VFR BLD AUTO: 43.6 % (ref 37–48.5)
HDLC SERPL-MCNC: 65 MG/DL (ref 40–75)
HDLC SERPL: 33 % (ref 20–50)
HGB BLD-MCNC: 14.5 G/DL (ref 12–16)
IMM GRANULOCYTES # BLD AUTO: 0.02 K/UL (ref 0–0.04)
IMM GRANULOCYTES NFR BLD AUTO: 0.4 % (ref 0–0.5)
LDLC SERPL CALC-MCNC: 113.6 MG/DL (ref 63–159)
LYMPHOCYTES # BLD AUTO: 1.5 K/UL (ref 1–4.8)
LYMPHOCYTES NFR BLD: 27.8 % (ref 18–48)
MCH RBC QN AUTO: 29.5 PG (ref 27–31)
MCHC RBC AUTO-ENTMCNC: 33.3 G/DL (ref 32–36)
MCV RBC AUTO: 89 FL (ref 82–98)
MONOCYTES # BLD AUTO: 0.5 K/UL (ref 0.3–1)
MONOCYTES NFR BLD: 8.9 % (ref 4–15)
NEUTROPHILS # BLD AUTO: 3.2 K/UL (ref 1.8–7.7)
NEUTROPHILS NFR BLD: 59.8 % (ref 38–73)
NONHDLC SERPL-MCNC: 132 MG/DL
NRBC BLD-RTO: 0 /100 WBC
PLATELET # BLD AUTO: 282 K/UL (ref 150–450)
PMV BLD AUTO: 10.1 FL (ref 9.2–12.9)
POTASSIUM SERPL-SCNC: 4.5 MMOL/L (ref 3.5–5.1)
PROT SERPL-MCNC: 6.9 G/DL (ref 6–8.4)
RBC # BLD AUTO: 4.92 M/UL (ref 4–5.4)
SODIUM SERPL-SCNC: 137 MMOL/L (ref 136–145)
TRIGL SERPL-MCNC: 92 MG/DL (ref 30–150)
TSH SERPL DL<=0.005 MIU/L-ACNC: 1.2 UIU/ML (ref 0.4–4)
UUN UR-MCNC: 13 MG/DL (ref 7–17)
WBC # BLD AUTO: 5.39 K/UL (ref 3.9–12.7)

## 2025-02-26 PROCEDURE — 80061 LIPID PANEL: CPT | Performed by: FAMILY MEDICINE

## 2025-02-26 PROCEDURE — 82306 VITAMIN D 25 HYDROXY: CPT | Mod: PN | Performed by: FAMILY MEDICINE

## 2025-02-26 PROCEDURE — 85025 COMPLETE CBC W/AUTO DIFF WBC: CPT | Mod: PN | Performed by: FAMILY MEDICINE

## 2025-02-26 PROCEDURE — 84443 ASSAY THYROID STIM HORMONE: CPT | Mod: PN | Performed by: FAMILY MEDICINE

## 2025-02-26 PROCEDURE — 80053 COMPREHEN METABOLIC PANEL: CPT | Mod: PN | Performed by: FAMILY MEDICINE

## 2025-02-26 NOTE — TELEPHONE ENCOUNTER
Pt going this AM, Wednesday Feb 26, for labs at 7:30 AM; labs were ordered April 2024, never done; reordered.

## 2025-02-27 ENCOUNTER — TELEPHONE (OUTPATIENT)
Dept: FAMILY MEDICINE | Facility: CLINIC | Age: 65
End: 2025-02-27
Payer: COMMERCIAL

## 2025-02-27 RX ORDER — MECLIZINE HYDROCHLORIDE 25 MG/1
25 TABLET ORAL 3 TIMES DAILY PRN
Qty: 90 TABLET | Refills: 1 | Status: SHIPPED | OUTPATIENT
Start: 2025-02-27

## 2025-03-04 ENCOUNTER — RESULTS FOLLOW-UP (OUTPATIENT)
Dept: FAMILY MEDICINE | Facility: CLINIC | Age: 65
End: 2025-03-04

## 2025-05-02 ENCOUNTER — TELEPHONE (OUTPATIENT)
Dept: FAMILY MEDICINE | Facility: CLINIC | Age: 65
End: 2025-05-02
Payer: COMMERCIAL

## 2025-05-02 DIAGNOSIS — Z12.31 ENCOUNTER FOR SCREENING MAMMOGRAM FOR MALIGNANT NEOPLASM OF BREAST: Primary | ICD-10-CM

## 2025-05-28 DIAGNOSIS — Z78.0 MENOPAUSE: ICD-10-CM

## 2025-06-05 ENCOUNTER — TELEPHONE (OUTPATIENT)
Dept: FAMILY MEDICINE | Facility: CLINIC | Age: 65
End: 2025-06-05
Payer: COMMERCIAL

## 2025-06-05 DIAGNOSIS — F41.9 ANXIETY: ICD-10-CM

## 2025-06-06 RX ORDER — ALPRAZOLAM 0.25 MG/1
TABLET ORAL
Qty: 30 TABLET | Refills: 5 | Status: SHIPPED | OUTPATIENT
Start: 2025-06-06

## 2025-06-09 ENCOUNTER — TELEPHONE (OUTPATIENT)
Dept: FAMILY MEDICINE | Facility: CLINIC | Age: 65
End: 2025-06-09

## 2025-06-09 ENCOUNTER — OFFICE VISIT (OUTPATIENT)
Dept: FAMILY MEDICINE | Facility: CLINIC | Age: 65
End: 2025-06-09
Payer: COMMERCIAL

## 2025-06-09 DIAGNOSIS — M76.62 ACHILLES TENDINITIS OF LEFT LOWER EXTREMITY: Primary | ICD-10-CM

## 2025-06-09 PROCEDURE — 99214 OFFICE O/P EST MOD 30 MIN: CPT | Mod: S$GLB,,, | Performed by: FAMILY MEDICINE

## 2025-06-09 NOTE — PROGRESS NOTES
Subjective:      Patient ID: Kentrell Aguilera is a 65 y.o. female.    Chief Complaint: No chief complaint on file.      There were no vitals filed for this visit.     HPI   Right ankle and heel pain for years getting worse; only injury was a forced dorsiflexion during a pedicure  No hx of surgery, had right foot surgery with Wm Daniels, last oct  Pain is getting unbearable, ice elevate, no rx, except tart cherry  Gummy, natural  NSAIDs no help        Problem List  Problem List[1]     ALLERGIES:   Review of patient's allergies indicates:   Allergen Reactions    Erythromycin     Pcn [penicillins]     Ozempic [semaglutide] Nausea And Vomiting       MEDS: Current Medications[2]      History:  Current Providers as of 6/9/2025  PCP: Rayray Mann MD  Care Team Provider: Felton Meraz LPN  Care Team Provider: Herman Santos LPN  Care Team Provider: Traci Kamara LPN  Care Team Provider: Orly Matamoros MD  Encounter Provider: Rayray Mann MD, starting on Mon Jun 9, 2025 12:00 AM  Referring Provider: not found, starting on Mon Jun 9, 2025 12:00 AM  Consulting Physician: Rayray Mann MD, starting on Mon Jun 9, 2025 12:33 PM (Active)   No past medical history on file.  Past Surgical History:   Procedure Laterality Date    BREAST BIOPSY Left     negative 2015 negative    INJECTION OF ANESTHETIC AGENT AROUND MEDIAL BRANCH NERVES INNERVATING LUMBAR FACET JOINT Bilateral 12/19/2022    Procedure: Bilateral L3-5 MBB;  Surgeon: Salinas Jennings MD;  Location: [facility]IN [facility]GT;  Service: Pain Management;  Laterality: Bilateral;    INJECTION OF ANESTHETIC AGENT AROUND MEDIAL BRANCH NERVES INNERVATING LUMBAR FACET JOINT Bilateral 01/09/2023    Procedure: Bilateral L3-5 MBB;  Surgeon: Salinas Jennings MD;  Location: [facility] PAIN [facility]GT;  Service: Pain Management;  Laterality: Bilateral;    INJECTION OF ANESTHETIC AGENT AROUND MEDIAL BRANCH NERVES INNERVATING LUMBAR FACET JOINT Bilateral 04/27/2023    Procedure: Bilateral  L4/L5 and L5/S1 Facet Injection;  Surgeon: Salinas Jennings MD;  [facility];  Service: Pain Management;  Laterality: Bilateral;    INJECTION OF ANESTHETIC AGENT AROUND MEDIAL BRANCH NERVES INNERVATING LUMBAR FACET JOINT Bilateral 11/13/2023    Procedure: Bilateral L4/L5 and L5/S1 Facet Injection;  Surgeon: Salinas Jennings MD;  [facility];  Service: Pain Management;  Laterality: Bilateral;    KNEE SURGERY Right 10/05/2023    RADIOFREQUENCY THERMOCOAGULATION Bilateral 01/30/2023    Procedure: Bilateral L3-5 Lumbar RFA;  Surgeon: Salinas Jennings MD;  [facility];  Service: Pain Management;  Laterality: Bilateral;    SELECTIVE INJECTION OF ANESTHETIC AGENT AROUND LUMBAR SPINAL NERVE ROOT BY TRANSFORAMINAL APPROACH Bilateral 11/09/2022    Procedure: Bilateral L5/S1 TF LAUREN;  Surgeon: Salinas Jennings MD;  Location: [facility]IN [facility]GT;  Service: Pain Management;  Laterality: Bilateral;     Social History[3]      Review of Systems   Musculoskeletal:  Positive for arthralgias, gait problem and joint swelling.        Left ankle   All other systems reviewed and are negative.    Objective:     Physical Exam  Vitals and nursing note reviewed.   Constitutional:       Appearance: She is well-developed.   HENT:      Head: Normocephalic.   Eyes:      Conjunctiva/sclera: Conjunctivae normal.      Pupils: Pupils are equal, round, and reactive to light.   Cardiovascular:      Rate and Rhythm: Normal rate and regular rhythm.      Heart sounds: Normal heart sounds.   Pulmonary:      Effort: Pulmonary effort is normal.      Breath sounds: Normal breath sounds.   Musculoskeletal:         General: Swelling and tenderness present. No deformity. Normal range of motion.      Cervical back: Normal range of motion and neck supple.      Comments: Left achilles insertion back of calcaneous swollen, tender   Skin:     General: Skin is warm and dry.   Neurological:      Mental Status: She is alert and oriented  to person, place, and time.      Deep Tendon Reflexes: Reflexes are normal and symmetric.   Psychiatric:         Behavior: Behavior normal.         Thought Content: Thought content normal.         Judgment: Judgment normal.             Assessment:     1. Achilles tendinitis of left lower extremity     To podiatry for this chronic painful distal achilles tendonitis     Plan:        Medication List            Accurate as of June 9, 2025 11:59 PM. If you have any questions, ask your nurse or doctor.                CONTINUE taking these medications      ALPRAZolam 0.25 MG tablet  Commonly known as: XANAX  TAKE 1 TABLET BY MOUTH NIGHTLY AS NEEDED FOR ANXIETY     hydrocortisone 2.5 % cream  APPLY  CREAM TO RASH TWICE DAILY     meclizine 25 mg tablet  Commonly known as: ANTIVERT  Take 1 tablet (25 mg total) by mouth 3 (three) times daily as needed for Dizziness.     ondansetron 4 MG Tbdl  Commonly known as: ZOFRAN-ODT  Take 1 tablet (4 mg total) by mouth every 6 (six) hours as needed (nausea).     valACYclovir 1000 MG tablet  Commonly known as: VALTREX  Take 2 tablets (2,000 mg total) by mouth every 12 (twelve) hours. For one day for fever blister            Achilles tendinitis of left lower extremity  -     Ambulatory referral/consult to Podiatry; Future; Expected date: 06/16/2025             [1]   Patient Active Problem List  Diagnosis    Right foot pain    Ingrown toenail of right foot    Loss of balance    Ataxia, unspecified    Carpal tunnel syndrome    BMI 36.0-36.9,adult    Granuloma annulare    Vertigo    Tinea versicolor    Seborrheic keratoses    Renal cyst, acquired, right    Snores    Insomnia    RUFUS (obstructive sleep apnea)    Oral phase dysphagia    Acute pain of right shoulder    Severe obesity (BMI 35.0-39.9) with comorbidity    Anxiety   [2]   Current Outpatient Medications:     ALPRAZolam (XANAX) 0.25 MG tablet, TAKE 1 TABLET BY MOUTH NIGHTLY AS NEEDED FOR ANXIETY, Disp: 30 tablet, Rfl: 5     hydrocortisone 2.5 % cream, APPLY  CREAM TO RASH TWICE DAILY, Disp: 28 g, Rfl: 5    meclizine (ANTIVERT) 25 mg tablet, Take 1 tablet (25 mg total) by mouth 3 (three) times daily as needed for Dizziness., Disp: 90 tablet, Rfl: 1    ondansetron (ZOFRAN-ODT) 4 MG TbDL, Take 1 tablet (4 mg total) by mouth every 6 (six) hours as needed (nausea)., Disp: 12 tablet, Rfl: 0    valACYclovir (VALTREX) 1000 MG tablet, Take 2 tablets (2,000 mg total) by mouth every 12 (twelve) hours. For one day for fever blister, Disp: 24 tablet, Rfl: 1  [3]   Social History  Tobacco Use    Smoking status: Former     Current packs/day: 0.00     Types: Cigarettes     Quit date:      Years since quittin.4     Passive exposure: Never    Smokeless tobacco: Never   Substance Use Topics    Alcohol use: Yes

## 2025-07-16 ENCOUNTER — OFFICE VISIT (OUTPATIENT)
Dept: FAMILY MEDICINE | Facility: CLINIC | Age: 65
End: 2025-07-16
Payer: COMMERCIAL

## 2025-07-16 VITALS
DIASTOLIC BLOOD PRESSURE: 64 MMHG | HEART RATE: 79 BPM | HEIGHT: 67 IN | OXYGEN SATURATION: 98 % | TEMPERATURE: 98 F | SYSTOLIC BLOOD PRESSURE: 114 MMHG | BODY MASS INDEX: 23.49 KG/M2

## 2025-07-16 DIAGNOSIS — R94.31 ABNORMAL EKG: ICD-10-CM

## 2025-07-16 DIAGNOSIS — F41.9 ANXIETY: Primary | ICD-10-CM

## 2025-07-16 PROCEDURE — 1126F AMNT PAIN NOTED NONE PRSNT: CPT | Mod: CPTII,S$GLB,, | Performed by: FAMILY MEDICINE

## 2025-07-16 PROCEDURE — 1159F MED LIST DOCD IN RCRD: CPT | Mod: CPTII,S$GLB,, | Performed by: FAMILY MEDICINE

## 2025-07-16 PROCEDURE — 1160F RVW MEDS BY RX/DR IN RCRD: CPT | Mod: CPTII,S$GLB,, | Performed by: FAMILY MEDICINE

## 2025-07-16 PROCEDURE — 1101F PT FALLS ASSESS-DOCD LE1/YR: CPT | Mod: CPTII,S$GLB,, | Performed by: FAMILY MEDICINE

## 2025-07-16 PROCEDURE — 3078F DIAST BP <80 MM HG: CPT | Mod: CPTII,S$GLB,, | Performed by: FAMILY MEDICINE

## 2025-07-16 PROCEDURE — 3008F BODY MASS INDEX DOCD: CPT | Mod: CPTII,S$GLB,, | Performed by: FAMILY MEDICINE

## 2025-07-16 PROCEDURE — 3288F FALL RISK ASSESSMENT DOCD: CPT | Mod: CPTII,S$GLB,, | Performed by: FAMILY MEDICINE

## 2025-07-16 PROCEDURE — 3074F SYST BP LT 130 MM HG: CPT | Mod: CPTII,S$GLB,, | Performed by: FAMILY MEDICINE

## 2025-07-16 PROCEDURE — 99215 OFFICE O/P EST HI 40 MIN: CPT | Mod: S$GLB,,, | Performed by: FAMILY MEDICINE

## 2025-07-16 RX ORDER — ESCITALOPRAM OXALATE 10 MG/1
10 TABLET ORAL DAILY
Qty: 30 TABLET | Refills: 113 | Status: SHIPPED | OUTPATIENT
Start: 2025-07-16 | End: 2025-07-17 | Stop reason: SDUPTHER
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
## 2025-07-17 ENCOUNTER — PATIENT MESSAGE (OUTPATIENT)
Dept: FAMILY MEDICINE | Facility: CLINIC | Age: 65
End: 2025-07-17
Payer: COMMERCIAL

## 2025-07-17 DIAGNOSIS — R07.9 CHEST PAIN, UNSPECIFIED TYPE: Primary | ICD-10-CM

## 2025-07-17 LAB
OHS QRS DURATION: 124 MS
OHS QTC CALCULATION: 486 MS

## 2025-07-17 RX ORDER — ESCITALOPRAM OXALATE 10 MG/1
10 TABLET ORAL DAILY
Qty: 90 TABLET | Refills: 1 | Status: SHIPPED | OUTPATIENT
Start: 2025-07-17 | End: 2026-07-17

## 2025-07-21 ENCOUNTER — OFFICE VISIT (OUTPATIENT)
Dept: PODIATRY | Facility: CLINIC | Age: 65
End: 2025-07-21
Payer: COMMERCIAL

## 2025-07-21 VITALS — BODY MASS INDEX: 23.53 KG/M2 | WEIGHT: 149.94 LBS | HEIGHT: 67 IN

## 2025-07-21 DIAGNOSIS — M79.672 BILATERAL FOOT PAIN: ICD-10-CM

## 2025-07-21 DIAGNOSIS — M79.671 BILATERAL FOOT PAIN: ICD-10-CM

## 2025-07-21 DIAGNOSIS — M76.62 ACHILLES TENDINITIS OF LEFT LOWER EXTREMITY: Primary | ICD-10-CM

## 2025-07-21 PROCEDURE — 1101F PT FALLS ASSESS-DOCD LE1/YR: CPT | Mod: CPTII,S$GLB,, | Performed by: STUDENT IN AN ORGANIZED HEALTH CARE EDUCATION/TRAINING PROGRAM

## 2025-07-21 PROCEDURE — 3288F FALL RISK ASSESSMENT DOCD: CPT | Mod: CPTII,S$GLB,, | Performed by: STUDENT IN AN ORGANIZED HEALTH CARE EDUCATION/TRAINING PROGRAM

## 2025-07-21 PROCEDURE — 99203 OFFICE O/P NEW LOW 30 MIN: CPT | Mod: S$GLB,,, | Performed by: STUDENT IN AN ORGANIZED HEALTH CARE EDUCATION/TRAINING PROGRAM

## 2025-07-21 PROCEDURE — 1125F AMNT PAIN NOTED PAIN PRSNT: CPT | Mod: CPTII,S$GLB,, | Performed by: STUDENT IN AN ORGANIZED HEALTH CARE EDUCATION/TRAINING PROGRAM

## 2025-07-21 PROCEDURE — 99999 PR PBB SHADOW E&M-EST. PATIENT-LVL IV: CPT | Mod: PBBFAC,,, | Performed by: STUDENT IN AN ORGANIZED HEALTH CARE EDUCATION/TRAINING PROGRAM

## 2025-07-21 PROCEDURE — 3008F BODY MASS INDEX DOCD: CPT | Mod: CPTII,S$GLB,, | Performed by: STUDENT IN AN ORGANIZED HEALTH CARE EDUCATION/TRAINING PROGRAM

## 2025-07-21 PROCEDURE — 1159F MED LIST DOCD IN RCRD: CPT | Mod: CPTII,S$GLB,, | Performed by: STUDENT IN AN ORGANIZED HEALTH CARE EDUCATION/TRAINING PROGRAM

## 2025-07-21 NOTE — PROGRESS NOTES
Subjective:     Patient ID: Kentrell Aguilera is a 65 y.o. female.    Chief Complaint: Heel Pain (Left posterior heel)    Kentrell is a 65 y.o. female who presents to the podiatry clinic  with complaint of  left foot pain. Onset of the symptoms was several months ago. Precipitating event: none known. Current symptoms include: worsening symptoms after a period of activity. Aggravating factors: any weight bearing. Symptoms have gradually worsened. Patient has had prior foot problems. Evaluation to date: none. Treatment to date: none. Patients rates pain 8/10 on pain scale. Also with history of intermittent pain to right midfoot. Relates to history of white coat syndrome and fear of needles. Relates her father has a history of gout.     Review of Systems   Constitutional: Negative for chills, decreased appetite, diaphoresis and fever.   HENT:  Negative for congestion and hearing loss.    Cardiovascular:  Negative for chest pain, claudication, leg swelling and syncope.   Respiratory:  Negative for cough and shortness of breath.    Skin:  Negative for color change, flushing, itching, poor wound healing and rash.   Musculoskeletal:  Negative for arthritis, back pain, joint pain and joint swelling.   Gastrointestinal:  Negative for nausea and vomiting.   Neurological:  Positive for paresthesias. Negative for focal weakness and weakness.   Psychiatric/Behavioral:  Negative for altered mental status. The patient is nervous/anxious.         Objective:     Physical Exam  Constitutional:       General: She is not in acute distress.     Appearance: She is well-developed. She is not diaphoretic.   Cardiovascular:      Comments: Dorsalis pedis and posterior tibial pulses are within normal limits. Skin temperature is within normal limits. Toes are cool to touch and feet are warm proximally. Hair growth is within normal limits. Skin is normotrophic and without hyperpigmentation. No edema noted. No spider veins or varicosities noted,  bilaterally.   Musculoskeletal:         General: Tenderness present.      Comments: Adequate joint range of motion without pain, limitation, nor crepitation to bilateral feet and ankle joints. Muscle strength is 5/5 in all groups bilaterally.    Tenderness to posterior left heel at insertion of achilles tendon to posterior aspect of calcaneus    Diffuse tenderness to right midfoot along TMTJ     Lymphadenopathy:      Comments: Negative lymphangitic streaking    Skin:     General: Skin is warm and dry.      Findings: No lesion.      Comments: Skin is warm and dry, no acute signs of infection noted. No open wounds, macerations or hyperkeratotic lesions, bilaterally.     Toenails are well trimmed and of normal morphology, bilaterally.    Neurological:      Mental Status: She is alert and oriented to person, place, and time.      Motor: No abnormal muscle tone.      Comments: Light touch within normal limits.    Psychiatric:         Behavior: Behavior normal.         Thought Content: Thought content normal.         Judgment: Judgment normal.           Assessment:      Encounter Diagnoses   Name Primary?    Achilles tendinitis of left lower extremity Yes    Bilateral foot pain      Plan:     Kentrell was seen today for heel pain.    Diagnoses and all orders for this visit:    Achilles tendinitis of left lower extremity  -     Ambulatory referral/consult to Podiatry  -     X-Ray Foot Complete Bilateral; Future  -     X-Ray Ankle Complete Left; Future  -     Uric acid; Future    Bilateral foot pain  -     Uric acid; Future    Other orders  -     methylPREDNISolone (MEDROL DOSEPACK) 4 mg tablet; use as directed  -     diclofenac sodium (VOLTAREN ARTHRITIS PAIN) 1 % Gel; Apply 2 g topically 4 (four) times daily.      I counseled the patient on her conditions, their implications and medical management.  Baseline xray ordered  RICE, OTC tylenol PRN pain  Supportive tennis shoes or open back clogs/mules with arch supports and  heel lift at all times while ambulating  Stretching and strengthening exercises dispensed. Rx PT  Return to clinic 6-8 weeks, sooner PRN

## 2025-07-22 ENCOUNTER — PATIENT MESSAGE (OUTPATIENT)
Dept: PODIATRY | Facility: CLINIC | Age: 65
End: 2025-07-22
Payer: COMMERCIAL

## 2025-07-22 RX ORDER — DICLOFENAC SODIUM 10 MG/G
2 GEL TOPICAL 4 TIMES DAILY
Qty: 50 G | Refills: 1 | Status: SHIPPED | OUTPATIENT
Start: 2025-07-22

## 2025-07-22 RX ORDER — METHYLPREDNISOLONE 4 MG/1
TABLET ORAL
Qty: 1 EACH | Refills: 0 | Status: SHIPPED | OUTPATIENT
Start: 2025-07-22 | End: 2025-08-12

## 2025-07-23 ENCOUNTER — HOSPITAL ENCOUNTER (OUTPATIENT)
Dept: RADIOLOGY | Facility: HOSPITAL | Age: 65
Discharge: HOME | End: 2025-07-23
Attending: STUDENT IN AN ORGANIZED HEALTH CARE EDUCATION/TRAINING PROGRAM
Payer: COMMERCIAL

## 2025-07-23 DIAGNOSIS — M76.62 ACHILLES TENDINITIS OF LEFT LOWER EXTREMITY: ICD-10-CM

## 2025-07-23 PROCEDURE — 73610 X-RAY EXAM OF ANKLE: CPT | Mod: TC,PN,LT

## 2025-07-23 PROCEDURE — 73610 X-RAY EXAM OF ANKLE: CPT | Mod: 26,LT,, | Performed by: RADIOLOGY

## 2025-07-23 PROCEDURE — 73630 X-RAY EXAM OF FOOT: CPT | Mod: 26,50,, | Performed by: RADIOLOGY

## 2025-07-23 PROCEDURE — 73630 X-RAY EXAM OF FOOT: CPT | Mod: TC,50,PN

## 2025-07-25 ENCOUNTER — LAB VISIT (OUTPATIENT)
Dept: LAB | Facility: HOSPITAL | Age: 65
End: 2025-07-25
Attending: STUDENT IN AN ORGANIZED HEALTH CARE EDUCATION/TRAINING PROGRAM
Payer: COMMERCIAL

## 2025-07-25 DIAGNOSIS — M79.672 BILATERAL FOOT PAIN: ICD-10-CM

## 2025-07-25 DIAGNOSIS — M79.671 BILATERAL FOOT PAIN: ICD-10-CM

## 2025-07-25 DIAGNOSIS — M76.62 ACHILLES TENDINITIS OF LEFT LOWER EXTREMITY: ICD-10-CM

## 2025-07-25 LAB — URATE SERPL-MCNC: 4.8 MG/DL (ref 2.4–5.7)

## 2025-07-25 PROCEDURE — 36415 COLL VENOUS BLD VENIPUNCTURE: CPT | Mod: PN

## 2025-07-25 PROCEDURE — 84550 ASSAY OF BLOOD/URIC ACID: CPT | Mod: PN
